# Patient Record
Sex: MALE | Race: WHITE | NOT HISPANIC OR LATINO | Employment: OTHER | URBAN - METROPOLITAN AREA
[De-identification: names, ages, dates, MRNs, and addresses within clinical notes are randomized per-mention and may not be internally consistent; named-entity substitution may affect disease eponyms.]

---

## 2023-08-21 ENCOUNTER — HOSPITAL ENCOUNTER (OUTPATIENT)
Dept: DATA CONVERSION | Facility: HOSPITAL | Age: 73
Discharge: HOME | End: 2023-08-21
Payer: MEDICARE

## 2023-08-21 DIAGNOSIS — N18.2 CHRONIC KIDNEY DISEASE, STAGE 2 (MILD): ICD-10-CM

## 2023-08-21 LAB
ANION GAP SERPL CALCULATED.3IONS-SCNC: 11 MMOL/L (ref 0–19)
BUN SERPL-MCNC: 36 MG/DL (ref 8–25)
BUN/CREAT SERPL: 36 RATIO (ref 8–21)
CALCIUM SERPL-MCNC: 8.3 MG/DL (ref 8.5–10.4)
CHLORIDE SERPL-SCNC: 107 MMOL/L (ref 97–107)
CO2 SERPL-SCNC: 23 MMOL/L (ref 24–31)
CREAT SERPL-MCNC: 1 MG/DL (ref 0.4–1.6)
GFR SERPL CREATININE-BSD FRML MDRD: 81 ML/MIN/1.73 M2
GLUCOSE SERPL-MCNC: 102 MG/DL (ref 65–99)
POTASSIUM SERPL-SCNC: 4.8 MMOL/L (ref 3.4–5.1)
SODIUM SERPL-SCNC: 141 MMOL/L (ref 133–145)

## 2023-08-22 ENCOUNTER — PATIENT OUTREACH (OUTPATIENT)
Dept: CARE COORDINATION | Facility: CLINIC | Age: 73
End: 2023-08-22
Payer: MEDICARE

## 2023-08-22 NOTE — PROGRESS NOTES
Outreach call to patient to support a smooth transition of care from recent admission.  Left voicemail message for patient with my contact information.    CHANO WardN, RN

## 2023-09-06 ENCOUNTER — HOSPITAL ENCOUNTER (OUTPATIENT)
Dept: DATA CONVERSION | Facility: HOSPITAL | Age: 73
Discharge: HOME | End: 2023-09-06
Payer: MEDICARE

## 2023-09-06 DIAGNOSIS — R06.02 SHORTNESS OF BREATH: ICD-10-CM

## 2023-09-06 DIAGNOSIS — I48.0 PAROXYSMAL ATRIAL FIBRILLATION (MULTI): ICD-10-CM

## 2023-09-06 DIAGNOSIS — R07.89 OTHER CHEST PAIN: ICD-10-CM

## 2023-09-06 DIAGNOSIS — Z79.01 LONG TERM (CURRENT) USE OF ANTICOAGULANTS: ICD-10-CM

## 2023-09-06 LAB
ALBUMIN SERPL-MCNC: 3.9 GM/DL (ref 3.5–5)
ALBUMIN/GLOB SERPL: 1.9 RATIO (ref 1.5–3)
ALP BLD-CCNC: 88 U/L (ref 35–125)
ALT SERPL-CCNC: 27 U/L (ref 5–40)
ANION GAP SERPL CALCULATED.3IONS-SCNC: 11 MMOL/L (ref 0–19)
ANTICOAGULANT: ABNORMAL
AST SERPL-CCNC: 20 U/L (ref 5–40)
BASOPHILS # BLD AUTO: 0.03 K/UL (ref 0–0.22)
BASOPHILS NFR BLD AUTO: 0.4 % (ref 0–1)
BILIRUB SERPL-MCNC: 0.7 MG/DL (ref 0.1–1.2)
BUN SERPL-MCNC: 20 MG/DL (ref 8–25)
BUN/CREAT SERPL: 22.2 RATIO (ref 8–21)
CALCIUM SERPL-MCNC: 9.6 MG/DL (ref 8.5–10.4)
CHLORIDE SERPL-SCNC: 100 MMOL/L (ref 97–107)
CO2 SERPL-SCNC: 28 MMOL/L (ref 24–31)
CREAT SERPL-MCNC: 0.9 MG/DL (ref 0.4–1.6)
DEPRECATED RDW RBC AUTO: 45.7 FL (ref 37–54)
DIFFERENTIAL METHOD BLD: ABNORMAL
EOSINOPHIL # BLD AUTO: 0.51 K/UL (ref 0–0.45)
EOSINOPHIL NFR BLD: 6.6 % (ref 0–3)
ERYTHROCYTE [DISTWIDTH] IN BLOOD BY AUTOMATED COUNT: 13.9 % (ref 11.7–15)
GFR SERPL CREATININE-BSD FRML MDRD: 92 ML/MIN/1.73 M2
GLOBULIN SER-MCNC: 2.1 G/DL (ref 1.9–3.7)
GLUCOSE SERPL-MCNC: 90 MG/DL (ref 65–99)
HCT VFR BLD AUTO: 45 % (ref 41–50)
HGB BLD-MCNC: 14.9 GM/DL (ref 13.5–16.5)
HS TROPONIN T DELTA: 1 (ref 0–4)
HS TROPONIN T DELTA: ABNORMAL (ref 0–4)
IMM GRANULOCYTES # BLD AUTO: 0.03 K/UL (ref 0–0.1)
INR IN PPP BY COAGULATION ASSAY EXTERNAL: 2.9
INR PPP: 2.5 (ref 0.86–1.16)
LYMPHOCYTES # BLD AUTO: 1.09 K/UL (ref 1.2–3.2)
LYMPHOCYTES NFR BLD MANUAL: 14.1 % (ref 20–40)
MCH RBC QN AUTO: 29.7 PG (ref 26–34)
MCHC RBC AUTO-ENTMCNC: 33.1 % (ref 31–37)
MCV RBC AUTO: 89.8 FL (ref 80–100)
MONOCYTES # BLD AUTO: 0.65 K/UL (ref 0–0.8)
MONOCYTES NFR BLD MANUAL: 8.4 % (ref 0–8)
NEUTROPHILS # BLD AUTO: 5.41 K/UL
NEUTROPHILS # BLD AUTO: 5.41 K/UL (ref 1.8–7.7)
NEUTROPHILS.IMMATURE NFR BLD: 0.4 % (ref 0–1)
NEUTS SEG NFR BLD: 70.1 % (ref 50–70)
NRBC BLD-RTO: 0 /100 WBC
NT-PROBNP SERPL-MCNC: 96 PG/ML (ref 0–229)
PLATELET # BLD AUTO: 171 K/UL (ref 150–450)
PMV BLD AUTO: 10.6 CU (ref 7–12.6)
POTASSIUM SERPL-SCNC: 4 MMOL/L (ref 3.4–5.1)
PROT SERPL-MCNC: 6 G/DL (ref 5.9–7.9)
PROTHROMBIN TIME (PT) IN PPP BY COAGULATION ASSAY EXTERNAL: NORMAL SECONDS
PROTHROMBIN TIME: 25.6 SEC (ref 9.3–12.7)
RBC # BLD AUTO: 5.01 M/UL (ref 4.5–5.5)
SODIUM SERPL-SCNC: 139 MMOL/L (ref 133–145)
TROPONIN T SERPL-MCNC: 36 NG/L
TROPONIN T SERPL-MCNC: 37 NG/L
WBC # BLD AUTO: 7.7 K/UL (ref 4.5–11)

## 2023-09-07 LAB
DATE AND TIME OF SYMPTOM ONSET: ABNORMAL
EUA DISCLAIMER: ABNORMAL
FLUAV RNA NPH QL NAA+PROBE: ABNORMAL
FLUBV RNA NPH QL NAA+PROBE: ABNORMAL
Lab: ABNORMAL
PREGNANCY STATUS: ABNORMAL
SARS-COV-2 RNA SPEC QL NAA+PROBE: ABNORMAL
WHETHER PATIENT IS EMPLOYED IN A HEALTHCARE SETTING: ABNORMAL
WHETHER PATIENT RESIDES IN A CONGREGATE CARE SETTING: ABNORMAL
WHETHER THE PATIENT HAS SYMPTOMS RELATED TO CONDITION OF INTEREST: ABNORMAL
WHETHER THE PATIENT WAS ADMITTED TO INTENSIVE CARE UNIT (ICU) FOR CONDITION OF INTEREST: ABNORMAL
WHETHER THE PATIENT WAS HOSPITALIZED FOR CONDITION OF INTEREST: ABNORMAL
WHETHER THIS IS THE PATIENT'S FIRST TEST FOR CONDITION OF INTEREST: ABNORMAL

## 2023-09-09 PROBLEM — H26.493 BILATERAL POSTERIOR CAPSULAR OPACIFICATION: Status: ACTIVE | Noted: 2023-09-09

## 2023-09-09 PROBLEM — M54.9 BACKACHE: Status: ACTIVE | Noted: 2023-09-09

## 2023-09-09 PROBLEM — R09.02 HYPOXIA: Status: ACTIVE | Noted: 2023-09-09

## 2023-09-09 PROBLEM — I47.10 NONSUSTAINED PAROXYSMAL SUPRAVENTRICULAR TACHYCARDIA (CMS-HCC): Status: ACTIVE | Noted: 2023-09-09

## 2023-09-09 PROBLEM — S32.050A COMPRESSION FRACTURE OF FIFTH LUMBAR VERTEBRA (MULTI): Status: ACTIVE | Noted: 2023-09-09

## 2023-09-09 PROBLEM — I10 BENIGN HYPERTENSION: Status: ACTIVE | Noted: 2023-09-09

## 2023-09-09 PROBLEM — M25.519 SHOULDER PAIN: Status: ACTIVE | Noted: 2023-09-09

## 2023-09-09 PROBLEM — J33.9 NASAL POLYPS: Status: ACTIVE | Noted: 2023-09-09

## 2023-09-09 PROBLEM — M81.0 AGE-RELATED OSTEOPOROSIS WITHOUT CURRENT PATHOLOGICAL FRACTURE: Status: ACTIVE | Noted: 2023-09-09
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
## 2023-09-09 PROBLEM — S22.39XA CLOSED FRACTURE OF ONE RIB: Status: ACTIVE | Noted: 2023-09-09

## 2023-09-09 PROBLEM — E78.5 HYPERLIPIDEMIA: Status: ACTIVE | Noted: 2023-09-09

## 2023-09-09 PROBLEM — R52 GENERALIZED PAIN: Status: ACTIVE | Noted: 2023-09-09

## 2023-09-09 PROBLEM — R07.9 CHEST PAIN: Status: ACTIVE | Noted: 2023-09-09

## 2023-09-09 PROBLEM — R06.2 WHEEZING: Status: ACTIVE | Noted: 2023-09-09

## 2023-09-09 PROBLEM — E86.0 DEHYDRATION: Status: ACTIVE | Noted: 2023-09-09

## 2023-09-09 PROBLEM — R06.00 DYSPNEA: Status: ACTIVE | Noted: 2023-09-09

## 2023-09-09 PROBLEM — Z86.0100 HX OF COLONIC POLYP: Status: ACTIVE | Noted: 2023-09-09

## 2023-09-09 PROBLEM — I48.20 CHRONIC ATRIAL FIBRILLATION (MULTI): Status: ACTIVE | Noted: 2023-09-09

## 2023-09-09 PROBLEM — M85.852 OSTEOPENIA OF LEFT HIP: Status: ACTIVE | Noted: 2023-09-09

## 2023-09-09 PROBLEM — J32.9 CHRONIC SINUSITIS: Status: ACTIVE | Noted: 2023-09-09

## 2023-09-09 PROBLEM — R06.03 RESPIRATORY DISTRESS: Status: ACTIVE | Noted: 2023-09-09

## 2023-09-09 PROBLEM — G47.33 OBSTRUCTIVE SLEEP APNEA SYNDROME: Status: ACTIVE | Noted: 2023-09-09

## 2023-09-09 PROBLEM — R42 DIZZINESS: Status: ACTIVE | Noted: 2023-09-09

## 2023-09-09 PROBLEM — R09.81 CONGESTION OF NASAL SINUS: Status: ACTIVE | Noted: 2023-09-09

## 2023-09-09 PROBLEM — E78.5 DYSLIPIDEMIA: Status: ACTIVE | Noted: 2023-09-09

## 2023-09-09 PROBLEM — R00.2 PALPITATIONS: Status: ACTIVE | Noted: 2023-09-09

## 2023-09-09 PROBLEM — I50.9 CONGESTIVE HEART FAILURE (MULTI): Status: ACTIVE | Noted: 2023-09-09

## 2023-09-09 PROBLEM — J18.9 ATYPICAL PNEUMONIA: Status: ACTIVE | Noted: 2023-09-09

## 2023-09-09 PROBLEM — J44.9 CHRONIC OBSTRUCTIVE PULMONARY DISEASE (MULTI): Status: ACTIVE | Noted: 2023-09-09

## 2023-09-09 PROBLEM — I47.29 NONSUSTAINED VENTRICULAR TACHYCARDIA (MULTI): Status: ACTIVE | Noted: 2023-09-09

## 2023-09-09 PROBLEM — C61 PROSTATE CANCER (MULTI): Status: ACTIVE | Noted: 2023-09-09

## 2023-09-09 PROBLEM — H35.3190 NONEXUDATIVE AGE-RELATED MACULAR DEGENERATION: Status: ACTIVE | Noted: 2023-09-09

## 2023-09-09 PROBLEM — R09.02 HYPOXEMIA: Status: ACTIVE | Noted: 2023-09-09

## 2023-09-09 PROBLEM — H35.3290 EXUDATIVE AGE-RELATED MACULAR DEGENERATION (MULTI): Status: ACTIVE | Noted: 2023-09-09

## 2023-09-09 PROBLEM — R68.83 CHILL: Status: ACTIVE | Noted: 2023-09-09

## 2023-09-09 PROBLEM — Z96.1 ARTIFICIAL LENS PRESENT: Status: ACTIVE | Noted: 2023-09-09

## 2023-09-09 PROBLEM — R53.81 MALAISE: Status: ACTIVE | Noted: 2023-09-09

## 2023-09-09 PROBLEM — Z86.010 HX OF COLONIC POLYP: Status: ACTIVE | Noted: 2023-09-09

## 2023-09-09 PROBLEM — I48.0 PAROXYSMAL ATRIAL FIBRILLATION (MULTI): Status: ACTIVE | Noted: 2023-09-09

## 2023-09-09 RX ORDER — IPRATROPIUM BROMIDE AND ALBUTEROL SULFATE 2.5; .5 MG/3ML; MG/3ML
SOLUTION RESPIRATORY (INHALATION)
COMMUNITY
Start: 2020-08-04 | End: 2023-11-03 | Stop reason: HOSPADM

## 2023-09-09 RX ORDER — MONTELUKAST SODIUM 10 MG/1
TABLET ORAL
COMMUNITY
End: 2023-11-03 | Stop reason: HOSPADM

## 2023-09-09 RX ORDER — DOXYCYCLINE HYCLATE 100 MG
TABLET ORAL
COMMUNITY
Start: 2023-06-11 | End: 2023-10-24 | Stop reason: ALTCHOICE

## 2023-09-09 RX ORDER — PREDNISONE 10 MG/1
10 TABLET ORAL DAILY
COMMUNITY
Start: 2021-08-20 | End: 2023-10-30 | Stop reason: ALTCHOICE

## 2023-09-09 RX ORDER — LIDOCAINE AND MENTHOL 240; 60 MG/1; MG/1
PATCH TOPICAL
COMMUNITY
Start: 2022-11-06 | End: 2023-11-03 | Stop reason: HOSPADM

## 2023-09-09 RX ORDER — TIOTROPIUM BROMIDE 18 UG/1
CAPSULE ORAL; RESPIRATORY (INHALATION)
COMMUNITY
End: 2023-11-03 | Stop reason: HOSPADM

## 2023-09-09 RX ORDER — PREDNISONE 20 MG/1
TABLET ORAL
COMMUNITY
Start: 2023-06-11 | End: 2023-10-30 | Stop reason: ALTCHOICE

## 2023-09-09 RX ORDER — KETOROLAC TROMETHAMINE 5 MG/ML
SOLUTION OPHTHALMIC
COMMUNITY
Start: 2021-05-06 | End: 2023-11-03 | Stop reason: HOSPADM

## 2023-09-09 RX ORDER — ALENDRONATE SODIUM 70 MG/1
TABLET ORAL
COMMUNITY
Start: 2020-12-14 | End: 2023-11-03 | Stop reason: HOSPADM

## 2023-09-09 RX ORDER — LOSARTAN POTASSIUM 50 MG/1
TABLET ORAL
COMMUNITY
Start: 2023-08-19 | End: 2023-10-30 | Stop reason: SDUPTHER

## 2023-09-09 RX ORDER — CEPHALEXIN 500 MG/1
1 CAPSULE ORAL 3 TIMES DAILY
COMMUNITY
Start: 2021-08-20 | End: 2023-10-24 | Stop reason: ALTCHOICE

## 2023-09-09 RX ORDER — WARFARIN 2 MG/1
2 TABLET ORAL DAILY
COMMUNITY
End: 2023-11-03 | Stop reason: HOSPADM

## 2023-09-09 RX ORDER — DILTIAZEM HYDROCHLORIDE 180 MG/1
CAPSULE, COATED, EXTENDED RELEASE ORAL
COMMUNITY
Start: 2023-08-19 | End: 2023-10-30 | Stop reason: SDUPTHER

## 2023-09-09 RX ORDER — DIGOXIN 125 MCG
TABLET ORAL
COMMUNITY
Start: 2023-08-19 | End: 2023-10-30 | Stop reason: SDUPTHER

## 2023-09-09 RX ORDER — FLUTICASONE PROPIONATE 50 MCG
SPRAY, SUSPENSION (ML) NASAL
COMMUNITY
End: 2023-11-03 | Stop reason: HOSPADM

## 2023-09-09 RX ORDER — LOSARTAN POTASSIUM AND HYDROCHLOROTHIAZIDE 12.5; 5 MG/1; MG/1
TABLET ORAL
COMMUNITY
End: 2023-10-02 | Stop reason: SDUPTHER

## 2023-09-09 RX ORDER — ATORVASTATIN CALCIUM 10 MG/1
TABLET, FILM COATED ORAL
COMMUNITY
End: 2023-11-03 | Stop reason: HOSPADM

## 2023-09-09 RX ORDER — TRAMADOL HYDROCHLORIDE 50 MG/1
TABLET ORAL
COMMUNITY
Start: 2022-12-01 | End: 2023-11-03 | Stop reason: HOSPADM

## 2023-09-09 RX ORDER — BUDESONIDE 0.5 MG/2ML
INHALANT ORAL
COMMUNITY
Start: 2020-11-10 | End: 2023-11-03 | Stop reason: HOSPADM

## 2023-09-09 RX ORDER — DILTIAZEM HYDROCHLORIDE 120 MG/1
CAPSULE, EXTENDED RELEASE ORAL
COMMUNITY
End: 2023-10-30 | Stop reason: SDUPTHER

## 2023-09-21 ENCOUNTER — HOSPITAL ENCOUNTER (OUTPATIENT)
Dept: DATA CONVERSION | Facility: HOSPITAL | Age: 73
Discharge: HOME | End: 2023-09-21
Payer: MEDICARE

## 2023-09-21 DIAGNOSIS — J44.1 CHRONIC OBSTRUCTIVE PULMONARY DISEASE WITH (ACUTE) EXACERBATION (MULTI): ICD-10-CM

## 2023-09-21 DIAGNOSIS — R07.9 CHEST PAIN, UNSPECIFIED: ICD-10-CM

## 2023-09-21 DIAGNOSIS — Z79.01 LONG TERM (CURRENT) USE OF ANTICOAGULANTS: ICD-10-CM

## 2023-09-21 DIAGNOSIS — R06.02 SHORTNESS OF BREATH: ICD-10-CM

## 2023-09-21 DIAGNOSIS — U07.1 COVID-19: ICD-10-CM

## 2023-09-21 DIAGNOSIS — R09.02 HYPOXEMIA: ICD-10-CM

## 2023-09-21 DIAGNOSIS — R05.9 COUGH, UNSPECIFIED: ICD-10-CM

## 2023-09-21 LAB
ANION GAP SERPL CALCULATED.3IONS-SCNC: 10 MMOL/L (ref 0–19)
ANTICOAGULANT: ABNORMAL
BASOPHILS # BLD AUTO: 0.08 K/UL (ref 0–0.22)
BASOPHILS NFR BLD AUTO: 0.8 % (ref 0–1)
BUN SERPL-MCNC: 21 MG/DL (ref 8–25)
BUN/CREAT SERPL: 21 RATIO (ref 8–21)
CALCIUM SERPL-MCNC: 9.5 MG/DL (ref 8.5–10.4)
CHLORIDE SERPL-SCNC: 105 MMOL/L (ref 97–107)
CO2 SERPL-SCNC: 27 MMOL/L (ref 24–31)
CREAT SERPL-MCNC: 1 MG/DL (ref 0.4–1.6)
D DIMER PPP FEU-MCNC: 0.89 MG/L FEU (ref 0.19–0.5)
DEPRECATED RDW RBC AUTO: 46.4 FL (ref 37–54)
DIFFERENTIAL METHOD BLD: ABNORMAL
EOSINOPHIL # BLD AUTO: 0.36 K/UL (ref 0–0.45)
EOSINOPHIL NFR BLD: 3.4 % (ref 0–3)
ERYTHROCYTE [DISTWIDTH] IN BLOOD BY AUTOMATED COUNT: 14.2 % (ref 11.7–15)
GFR SERPL CREATININE-BSD FRML MDRD: 81 ML/MIN/1.73 M2
GLUCOSE SERPL-MCNC: 89 MG/DL (ref 65–99)
HCT VFR BLD AUTO: 44.7 % (ref 41–50)
HGB BLD-MCNC: 14.8 GM/DL (ref 13.5–16.5)
HS TROPONIN T DELTA: 1 (ref 0–4)
HS TROPONIN T DELTA: ABNORMAL (ref 0–4)
IMM GRANULOCYTES # BLD AUTO: 0.13 K/UL (ref 0–0.1)
INR PPP: 2.4 (ref 0.86–1.16)
LYMPHOCYTES # BLD AUTO: 0.85 K/UL (ref 1.2–3.2)
LYMPHOCYTES NFR BLD MANUAL: 8.1 % (ref 20–40)
MCH RBC QN AUTO: 29.7 PG (ref 26–34)
MCHC RBC AUTO-ENTMCNC: 33.1 % (ref 31–37)
MCV RBC AUTO: 89.8 FL (ref 80–100)
MONOCYTES # BLD AUTO: 0.75 K/UL (ref 0–0.8)
MONOCYTES NFR BLD MANUAL: 7.1 % (ref 0–8)
NEUTROPHILS # BLD AUTO: 8.37 K/UL
NEUTROPHILS # BLD AUTO: 8.37 K/UL (ref 1.8–7.7)
NEUTROPHILS.IMMATURE NFR BLD: 1.2 % (ref 0–1)
NEUTS SEG NFR BLD: 79.4 % (ref 50–70)
NOTE (COV): ABNORMAL
NRBC BLD-RTO: 0 /100 WBC
NT-PROBNP SERPL-MCNC: 93 PG/ML (ref 0–229)
PLATELET # BLD AUTO: 222 K/UL (ref 150–450)
PMV BLD AUTO: 10.6 CU (ref 7–12.6)
POTASSIUM SERPL-SCNC: 3.9 MMOL/L (ref 3.4–5.1)
PROTHROMBIN TIME: 24.8 SEC (ref 9.3–12.7)
RBC # BLD AUTO: 4.98 M/UL (ref 4.5–5.5)
SARS-COV-2 RNA RESP QL NAA+PROBE: ABNORMAL
SODIUM SERPL-SCNC: 142 MMOL/L (ref 133–145)
SPECIMEN SOURCE (COV): ABNORMAL
TROPONIN T SERPL-MCNC: 34 NG/L
TROPONIN T SERPL-MCNC: 35 NG/L
WBC # BLD AUTO: 10.5 K/UL (ref 4.5–11)

## 2023-09-25 DIAGNOSIS — I48.0 PAROXYSMAL ATRIAL FIBRILLATION (MULTI): Primary | ICD-10-CM

## 2023-10-02 ENCOUNTER — OFFICE VISIT (OUTPATIENT)
Dept: PRIMARY CARE | Facility: CLINIC | Age: 73
End: 2023-10-02
Payer: MEDICARE

## 2023-10-02 VITALS
BODY MASS INDEX: 26.11 KG/M2 | SYSTOLIC BLOOD PRESSURE: 140 MMHG | WEIGHT: 182 LBS | HEART RATE: 92 BPM | OXYGEN SATURATION: 97 % | RESPIRATION RATE: 16 BRPM | DIASTOLIC BLOOD PRESSURE: 80 MMHG

## 2023-10-02 DIAGNOSIS — I50.42 CHRONIC COMBINED SYSTOLIC AND DIASTOLIC CONGESTIVE HEART FAILURE (MULTI): ICD-10-CM

## 2023-10-02 DIAGNOSIS — I10 PRIMARY HYPERTENSION: ICD-10-CM

## 2023-10-02 DIAGNOSIS — I10 BENIGN HYPERTENSION: ICD-10-CM

## 2023-10-02 DIAGNOSIS — Z12.5 ENCOUNTER FOR PROSTATE CANCER SCREENING: ICD-10-CM

## 2023-10-02 DIAGNOSIS — I48.20 CHRONIC ATRIAL FIBRILLATION (MULTI): ICD-10-CM

## 2023-10-02 PROBLEM — J44.9 COPD (CHRONIC OBSTRUCTIVE PULMONARY DISEASE) (MULTI): Status: ACTIVE | Noted: 2023-10-02

## 2023-10-02 PROBLEM — M85.80 OSTEOPENIA: Status: ACTIVE | Noted: 2023-10-02

## 2023-10-02 PROBLEM — I48.91 A-FIB (MULTI): Status: ACTIVE | Noted: 2023-10-02

## 2023-10-02 PROCEDURE — 99214 OFFICE O/P EST MOD 30 MIN: CPT | Performed by: INTERNAL MEDICINE

## 2023-10-02 PROCEDURE — 1159F MED LIST DOCD IN RCRD: CPT | Performed by: INTERNAL MEDICINE

## 2023-10-02 PROCEDURE — G0439 PPPS, SUBSEQ VISIT: HCPCS | Performed by: INTERNAL MEDICINE

## 2023-10-02 PROCEDURE — 3077F SYST BP >= 140 MM HG: CPT | Performed by: INTERNAL MEDICINE

## 2023-10-02 PROCEDURE — 1126F AMNT PAIN NOTED NONE PRSNT: CPT | Performed by: INTERNAL MEDICINE

## 2023-10-02 PROCEDURE — 1036F TOBACCO NON-USER: CPT | Performed by: INTERNAL MEDICINE

## 2023-10-02 PROCEDURE — 3079F DIAST BP 80-89 MM HG: CPT | Performed by: INTERNAL MEDICINE

## 2023-10-02 RX ORDER — LOSARTAN POTASSIUM AND HYDROCHLOROTHIAZIDE 12.5; 5 MG/1; MG/1
1 TABLET ORAL DAILY
Qty: 90 TABLET | Refills: 3 | Status: SHIPPED | OUTPATIENT
Start: 2023-10-02 | End: 2023-11-03 | Stop reason: HOSPADM

## 2023-10-02 ASSESSMENT — ENCOUNTER SYMPTOMS
ABDOMINAL PAIN: 0
DIARRHEA: 1
DEPRESSION: 0
LOSS OF SENSATION IN FEET: 0
SEIZURES: 0
HEMATURIA: 0
COUGH: 1
SHORTNESS OF BREATH: 0
OCCASIONAL FEELINGS OF UNSTEADINESS: 0
CHILLS: 0
FEVER: 0
BACK PAIN: 0

## 2023-10-02 ASSESSMENT — PATIENT HEALTH QUESTIONNAIRE - PHQ9
2. FEELING DOWN, DEPRESSED OR HOPELESS: NOT AT ALL
1. LITTLE INTEREST OR PLEASURE IN DOING THINGS: NOT AT ALL
SUM OF ALL RESPONSES TO PHQ9 QUESTIONS 1 AND 2: 0

## 2023-10-02 ASSESSMENT — PAIN SCALES - GENERAL: PAINLEVEL: 0-NO PAIN

## 2023-10-02 NOTE — PROGRESS NOTES
Subjective   Patient ID: Reggie Munoz is a 72 y.o. male who presents for Annual Exam. Overall he is feeling well. He reports his breathing is not bad. He does not monitor his BP at home. He does not use pill box and no one assists him with his medications. He lives by himself and follows with Dr. Abad. He reports hearing loss, occ cough, and one episode of diarrhea. He is not interested in the flu shot.    Medication Documentation Review Audit       Reviewed by Evelio Edmonds MA (Medical Assistant) on 10/02/23 at 1454      Medication Order Taking? Sig Documenting Provider Last Dose Status   alendronate (Fosamax) 70 mg tablet [id number] No Alendronate Sodium 70 MG Oral Tablet   Quantity: 4  Refills: 0        Start : 14-Dec-2020   Active Historical Provider, MD Not Taking Active   atorvastatin (Lipitor) 10 mg tablet [id number] Yes  Historical Provider, MD Taking Active   azithromycin (Zithromax) 250 mg tablet [id number] No TAKE 1 TABLET BY MOUTH DAILY   Patient not taking: Reported on 10/2/2023    Keeley Painter APRN-CNP Not Taking Active   budesonide (Pulmicort) 0.5 mg/2 mL nebulizer solution [id number] Yes Budesonide 0.5 MG/2ML Inhalation Suspension   Quantity: 120  Refills: 0        Start : 10-Nov-2020   Active Historical Provider, MD Taking Active   cephalexin (Keflex) 500 mg capsule [id number] No Take 1 capsule (500 mg) by mouth 3 times a day. Historical Provider, MD Not Taking Active   digoxin (Lanoxin) 125 MCG tablet [id number] No  Historical Provider, MD Not Taking Active   digoxin (Lanoxin) 125 MCG tablet [id number] No TAKE 1 TABLET BY MOUTH DAILY   Patient not taking: Reported on 10/2/2023    Keeley Painter APRN-CNP Not Taking Active   dilTIAZem CD (Cardizem CD) 180 mg 24 hr capsule [id number] No  Historical Provider, MD Not Taking Active   dilTIAZem CD (Cardizem CD) 180 mg 24 hr capsule [id number] No TAKE 1 CAPSULE BY MOUTH DAILY   Patient not taking: Reported on 10/2/2023    Keeley Painter  APRN-CNP Not Taking Active   dilTIAZem ER (Tiazac) 120 mg 24 hr capsule [id number] No DilTIAZem  MG CP24   Refills: 0       Active Historical Provider, MD Not Taking Active   doxycycline (Vibra-Tabs) 100 mg tablet [id number] No  Historical Provider, MD Not Taking Active   fluticasone (Flonase) 50 mcg/actuation nasal spray [id number] Yes  Historical Provider, MD Taking Active   Icy Hot Patch, lido-menthol, 4-1 % adhesive patch,medicated [id number] No  Historical Provider, MD Not Taking Active   ipratropium-albuteroL (Duo-Neb) 0.5-2.5 mg/3 mL nebulizer solution [id number] Yes Inhale. Historical Provider, MD Taking Active   ketorolac (Acular) 0.5 % ophthalmic solution [id number] No Ketorolac Tromethamine 0.5 % Ophthalmic Solution   Quantity: 5  Refills: 0        Start : 6-May-2021   Active Historical Provider, MD Not Taking Active   losartan (Cozaar) 50 mg tablet [id number] No  Historical Provider, MD Not Taking Active   losartan (Cozaar) 50 mg tablet [id number] No TAKE 1 TABLET BY MOUTH DAILY   Patient not taking: Reported on 10/2/2023    RYLAND Mora Not Taking Active   losartan-hydrochlorothiazide (Hyzaar) 50-12.5 mg tablet [id number] Yes  Historical Provider, MD Taking Active   montelukast (Singulair) 10 mg tablet [id number] Yes  Historical Provider, MD Taking Active   predniSONE (Deltasone) 10 mg tablet [id number] Yes Take 1 tablet (10 mg) by mouth once daily. Historical Provider, MD Taking Active   predniSONE (Deltasone) 10 mg tablet [id number] No TAKE 6 PILLS 8/20 & 8/21, 5 PILLS 8/22 AND 8/23, 4 PILLS 8/24 AND 8/25, 3 PILLS 8/26 AND 8/27, 2 PILLS 8/28 & 8/29 AND 1 PILL 8/30 & 8/31   Patient not taking: Reported on 10/2/2023    RYLAND Mora Not Taking Active   predniSONE (Deltasone) 20 mg tablet [id number] No  Historical Provider, MD Not Taking Active   tiotropium (Spiriva with HandiHaler) 18 mcg inhalation capsule [id number] Yes  Historical Provider, MD Taking Active   traMADol (Ultram)  50 mg tablet [id number] No  Historical Provider, MD Not Taking Active   warfarin (Coumadin) 2 mg tablet [id number] Yes Take 2 mg/kg by mouth once daily. Historical Provider, MD Taking Active                  Past Medical History:   Diagnosis Date    A-fib (CMS/[facility])     COPD (chronic obstructive pulmonary disease) (CMS/[facility])     Hyperlipidemia     Hypertension     Osteopenia     Personal history of other diseases of the circulatory system     History of angina    Personal history of other diseases of the circulatory system     History of hypertension    Personal history of other diseases of the respiratory system     History of chronic obstructive lung disease    Unspecified asthma with (acute) exacerbation     Asthma with acute exacerbation, unspecified asthma severity, unspecified whether persistent     Past Surgical History:   Procedure Laterality Date    OTHER SURGICAL HISTORY  07/23/2021    No history of surgery       Review of Systems   Constitutional:  Negative for chills and fever.   HENT:  Positive for hearing loss. Negative for dental problem and ear pain.    Respiratory:  Positive for cough. Negative for shortness of breath.    Cardiovascular:  Negative for chest pain.   Gastrointestinal:  Positive for diarrhea. Negative for abdominal pain.   Endocrine: Negative for cold intolerance.   Genitourinary:  Negative for hematuria.   Musculoskeletal:  Negative for back pain.   Skin:  Negative for rash.   Neurological:  Negative for seizures.       Objective   /80   Pulse 92   Resp 16   Wt 82.6 kg (182 lb)   SpO2 97%   BMI 26.11 kg/m²     Physical Exam  HENT:      Right Ear: Tympanic membrane normal.      Left Ear: Tympanic membrane normal.      Mouth/Throat:      Pharynx: Oropharynx is clear. No oropharyngeal exudate.   Eyes:      Conjunctiva/sclera: Conjunctivae normal.      Pupils: Pupils are equal, round, and reactive to light.   Neck:      Thyroid: No thyromegaly.      Vascular: No carotid bruit.    Cardiovascular:      Rate and Rhythm: Regular rhythm.      Heart sounds: Normal heart sounds.   Pulmonary:      Breath sounds: Normal breath sounds.   Abdominal:      General: Bowel sounds are normal.      Palpations: Abdomen is soft. There is no hepatomegaly.      Tenderness: There is no abdominal tenderness.   Musculoskeletal:      Right lower le+ Pitting Edema present.      Left lower le+ Pitting Edema present.   Lymphadenopathy:      Cervical: No cervical adenopathy.   Skin:     General: Skin is warm.   Neurological:      Mental Status: He is alert and oriented to person, place, and time.      Sensory: No sensory deficit.   Psychiatric:         Mood and Affect: Mood and affect normal.         Behavior: Behavior normal. Behavior is cooperative.         Cognition and Memory: Cognition normal.     Diagnostics Reviewed: 2023 Echo normal, 2023 CT angio normal  Labs Reviewed: 2023 BMP nml, CBC nml,     Assessment/Plan   There are no diagnoses linked to this encounter.      By signing my name below, Amarilis COLLINS Scribe   attest that this documentation has been prepared under the direction and in the presence of Rajwinder Dawson MD.

## 2023-10-04 ENCOUNTER — INFUSION (OUTPATIENT)
Dept: PRIMARY CARE | Facility: HOSPITAL | Age: 73
End: 2023-10-04
Payer: MEDICARE

## 2023-10-04 DIAGNOSIS — I48.0 PAROXYSMAL ATRIAL FIBRILLATION (MULTI): Primary | ICD-10-CM

## 2023-10-04 LAB
POC INR: 3.6
POC PROTHROMBIN TIME: NORMAL

## 2023-10-04 PROCEDURE — 85610 PROTHROMBIN TIME: CPT | Performed by: NURSE PRACTITIONER

## 2023-10-11 ENCOUNTER — ANTICOAGULATION - WARFARIN VISIT (OUTPATIENT)
Dept: CARDIOLOGY | Facility: HOSPITAL | Age: 73
End: 2023-10-11
Payer: MEDICARE

## 2023-10-11 DIAGNOSIS — I48.0 PAROXYSMAL ATRIAL FIBRILLATION (MULTI): Primary | ICD-10-CM

## 2023-10-11 LAB
POC INR: 3
POC PROTHROMBIN TIME: NORMAL

## 2023-10-11 PROCEDURE — 99211 OFF/OP EST MAY X REQ PHY/QHP: CPT | Performed by: INTERNAL MEDICINE

## 2023-10-11 PROCEDURE — 85610 PROTHROMBIN TIME: CPT

## 2023-10-11 NOTE — PROGRESS NOTES
Patient identification verified with 2 identifiers.    Location: [facility] - 1st Floor Anticoagulation Clinic [address] [id number]    Referring Physician: Dr. Abad  Enrollment/ Re-enrollment date: 10/24/24   INR Goal: 2.0-3.0  INR monitoring is per [facility] protocol.  Anticoagulation Medication: warfarin  Indication: atrial fibrillation    Subjective   Bleeding signs/symptoms:      Bruising:     Major bleeding event:    Thrombosis signs/symptoms:    Thromboembolic event:    Missed doses:    Extra doses:    Medication changes:    Dietary changes:    Change in health:    Change in activity:    Alcohol:    Other concerns:      Upcoming Surgeries:  Does the Patient Have any upcoming surgeries that require interruption in anticoagulation therapy? no  Does the patient require bridging? no      Anticoagulation Summary  As of 10/11/2023      INR goal:  2.0-3.0   TTR:  --   INR used for dosing:                 Assessment/Plan   Therapeutic     1. New dose: no change    2. Next INR: 2 weeks      Education provided to patient during the visit:  Patient instructed to call in interim with questions, concerns and changes.

## 2023-10-19 ENCOUNTER — TELEPHONE (OUTPATIENT)
Dept: PRIMARY CARE | Facility: CLINIC | Age: 73
End: 2023-10-19
Payer: MEDICARE

## 2023-10-19 NOTE — TELEPHONE ENCOUNTER
From ans machine 10/19/2023 @12:06pm:  Have you received their fax from 10/16/23 asking for office notes for an rx?  This request is urgent.  Fax #[phone].

## 2023-10-24 ENCOUNTER — OFFICE VISIT (OUTPATIENT)
Dept: CARDIOLOGY | Facility: CLINIC | Age: 73
End: 2023-10-24
Payer: MEDICARE

## 2023-10-24 VITALS
DIASTOLIC BLOOD PRESSURE: 80 MMHG | HEART RATE: 82 BPM | WEIGHT: 182 LBS | BODY MASS INDEX: 26.11 KG/M2 | SYSTOLIC BLOOD PRESSURE: 145 MMHG | OXYGEN SATURATION: 95 %

## 2023-10-24 DIAGNOSIS — I50.32 CHRONIC DIASTOLIC CONGESTIVE HEART FAILURE (MULTI): ICD-10-CM

## 2023-10-24 DIAGNOSIS — I10 BENIGN HYPERTENSION: Primary | ICD-10-CM

## 2023-10-24 DIAGNOSIS — E78.5 DYSLIPIDEMIA: ICD-10-CM

## 2023-10-24 DIAGNOSIS — I10 PRIMARY HYPERTENSION: ICD-10-CM

## 2023-10-24 DIAGNOSIS — I48.0 PAROXYSMAL ATRIAL FIBRILLATION (MULTI): ICD-10-CM

## 2023-10-24 PROCEDURE — 99214 OFFICE O/P EST MOD 30 MIN: CPT | Performed by: INTERNAL MEDICINE

## 2023-10-24 PROCEDURE — 1036F TOBACCO NON-USER: CPT | Performed by: INTERNAL MEDICINE

## 2023-10-24 PROCEDURE — 1159F MED LIST DOCD IN RCRD: CPT | Performed by: INTERNAL MEDICINE

## 2023-10-24 PROCEDURE — 1126F AMNT PAIN NOTED NONE PRSNT: CPT | Performed by: INTERNAL MEDICINE

## 2023-10-24 PROCEDURE — 3079F DIAST BP 80-89 MM HG: CPT | Performed by: INTERNAL MEDICINE

## 2023-10-24 PROCEDURE — 3077F SYST BP >= 140 MM HG: CPT | Performed by: INTERNAL MEDICINE

## 2023-10-24 RX ORDER — BUTALB/ACETAMINOPHEN/CAFFEINE 50-325-40
1 TABLET ORAL DAILY
COMMUNITY
End: 2023-11-03 | Stop reason: HOSPADM

## 2023-10-24 RX ORDER — ALBUTEROL SULFATE 90 UG/1
2 AEROSOL, METERED RESPIRATORY (INHALATION) EVERY 6 HOURS PRN
COMMUNITY
End: 2024-03-23 | Stop reason: HOSPADM

## 2023-10-24 ASSESSMENT — PATIENT HEALTH QUESTIONNAIRE - PHQ9
SUM OF ALL RESPONSES TO PHQ9 QUESTIONS 1 & 2: 0
1. LITTLE INTEREST OR PLEASURE IN DOING THINGS: NOT AT ALL
2. FEELING DOWN, DEPRESSED OR HOPELESS: NOT AT ALL

## 2023-10-24 ASSESSMENT — ENCOUNTER SYMPTOMS
DYSPNEA ON EXERTION: 1
ORTHOPNEA: 0
SYNCOPE: 0
FEVER: 0
PALPITATIONS: 0
WHEEZING: 0
PND: 0
DIZZINESS: 0
CLAUDICATION: 0
NEAR-SYNCOPE: 0
COUGH: 0
DEPRESSION: 0
OCCASIONAL FEELINGS OF UNSTEADINESS: 0
WEAKNESS: 0
MYALGIAS: 0
WEIGHT LOSS: 0
IRREGULAR HEARTBEAT: 0
SHORTNESS OF BREATH: 0
DIAPHORESIS: 0
LOSS OF SENSATION IN FEET: 0
WEIGHT GAIN: 0

## 2023-10-24 ASSESSMENT — PAIN SCALES - GENERAL: PAINLEVEL: 0-NO PAIN

## 2023-10-24 NOTE — PROGRESS NOTES
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  He also has a history of paroxysmal atrial fibrillation and has been on oral anticoagulation in the form of Coumadin.  He has  severe COPD.  I saw him last in the office in February 2020 this was immediately prior to the COVID pandemic.  He was doing well.  His last hospitalization was in August 2020 for this was for a COPD exacerbation that was rather uncomplicated.       Review of Systems   Constitutional: Negative for diaphoresis, fever, weight gain and weight loss.   Eyes:  Negative for visual disturbance.   Cardiovascular:  Positive for dyspnea on exertion. Negative for chest pain, claudication, irregular heartbeat, leg swelling, near-syncope, orthopnea, palpitations, paroxysmal nocturnal dyspnea and syncope.   Respiratory:  Negative for cough, shortness of breath and wheezing.    Musculoskeletal:  Negative for muscle weakness and myalgias.   Neurological:  Negative for dizziness and weakness.   All other systems reviewed and are negative.       Past Medical History:   Diagnosis Date    A-fib (CMS/[facility])     COPD (chronic obstructive pulmonary disease) (CMS/[facility])     Hyperlipidemia     Hypertension     Osteopenia     Personal history of other diseases of the circulatory system     History of angina    Personal history of other diseases of the circulatory system     History of hypertension    Personal history of other diseases of the respiratory system     History of chronic obstructive lung disease    Unspecified asthma with (acute) exacerbation     Asthma with acute exacerbation, unspecified asthma severity, unspecified whether persistent        Past Surgical History:   Procedure Laterality Date    IR ANGIOGRAM PULMONARY Bilateral 4/10/2012    IR ANGIOGRAM PULMONARY [facility]    OTHER SURGICAL HISTORY  07/23/2021    No history of  surgery        Social History     Socioeconomic History    Marital status: Single     Spouse name: Not on file    Number of children: Not on file    Years of education: Not on file    Highest education level: Not on file   Occupational History    Not on file   Tobacco Use    Smoking status: Never    Smokeless tobacco: Never   Vaping Use    Vaping Use: Never used   Substance and Sexual Activity    Alcohol use: Not Currently    Drug use: Never    Sexual activity: Not Currently   Other Topics Concern    Not on file   Social History Narrative    Not on file     Social Determinants of Health     Financial Resource Strain: Not on file   Food Insecurity: Not on file   Transportation Needs: Not on file   Physical Activity: Not on file   Stress: Not on file   Social Connections: Not on file   Intimate Partner Violence: Not on file   Housing Stability: Not on file        Family History   Problem Relation Name Age of Onset    Stroke Mother      Lung cancer Father      Stroke Sister          OBJECTIVE:    Vitals:    10/24/23 1446   BP: 145/80   Pulse:    SpO2:         Vitals reviewed.   Constitutional:       Appearance: Normal and healthy appearance. Not in distress.   Pulmonary:      Effort: Pulmonary effort is normal.      Breath sounds: Normal breath sounds.   Cardiovascular:      Normal rate. Regular rhythm. Normal S1. Normal S2.       Murmurs: There is no murmur.      No gallop.  No click.   Pulses:     Intact distal pulses.   Edema:     Peripheral edema absent.   Skin:     General: Skin is warm and dry.   Neurological:      General: No focal deficit present.        Lab Review:   Lab Results   Component Value Date     09/21/2023    K 3.9 09/21/2023     09/21/2023    CO2 27 09/21/2023    BUN 21 09/21/2023    CREATININE 1.0 09/21/2023    GLUCOSE 89 09/21/2023    CALCIUM 9.5 09/21/2023     Lab Results   Component Value Date    CHOL 155 02/14/2022    TRIG 155 (H) 02/14/2022    HDL 66 02/14/2022       Lab Results    Component Value Date    LDLCALC 58 (L) 02/14/2022        Benign hypertension  High today. Discussed daily salt restriction. Advised to take BP 2-3x/week at home. No med changes today    Paroxysmal atrial fibrillation (CMS/HCC)  Controlled. Continue current rate control, continue AC    Congestive heart failure (CMS/HCC)  Diastolic. Euvolemic. Continue current medical therapy

## 2023-10-24 NOTE — ASSESSMENT & PLAN NOTE
High today. Discussed daily salt restriction. Advised to take BP 2-3x/week at home. No med changes today

## 2023-10-25 ENCOUNTER — ANTICOAGULATION - WARFARIN VISIT (OUTPATIENT)
Dept: CARDIOLOGY | Facility: HOSPITAL | Age: 73
End: 2023-10-25
Payer: MEDICARE

## 2023-10-25 DIAGNOSIS — I48.0 PAROXYSMAL ATRIAL FIBRILLATION (MULTI): Primary | ICD-10-CM

## 2023-10-25 LAB
POC INR: 3.3
POC PROTHROMBIN TIME: NORMAL

## 2023-10-25 PROCEDURE — 85610 PROTHROMBIN TIME: CPT | Mod: QW

## 2023-10-25 PROCEDURE — 99211 OFF/OP EST MAY X REQ PHY/QHP: CPT | Performed by: INTERNAL MEDICINE

## 2023-10-25 NOTE — PROGRESS NOTES
Patient identification verified with 2 identifiers.    Location: [facility] - 1st Floor Anticoagulation Clinic [address], [place] [address]    Referring Physician: Dr. Abad  Enrollment/ Re-enrollment date: 10/24/24   INR Goal: 2.0-3.0  INR monitoring is per [facility] protocol.  Anticoagulation Medication: warfarin  Indication: Atrial Fibrillation/Atrial Flutter    Subjective   Bleeding signs/symptoms: No    Bruising: No   Major bleeding event: No  Thrombosis signs/symptoms: No  Thromboembolic event: No  Missed doses: No  Extra doses: No  Medication changes: Yes  started in losaratan-HCTZ and a new inhaler. started on 10/2  Dietary changes: No  Change in health: No  Change in activity: No  Alcohol: No  Other concerns: No    Upcoming Surgeries:  Does the Patient Have any upcoming surgeries that require interruption in anticoagulation therapy? no  Does the patient require bridging? no      Anticoagulation Summary  As of 10/25/2023      INR goal:  2.0-3.0   TTR:  0.0 % (2.9 wk)   INR used for dosing:  3.30 (10/25/2023)   Weekly warfarin total:  14 mg               Assessment/Plan   Supratherapeutic     1. New dose:  dose decreased      2. Next INR: 2 weeks      Education provided to patient during the visit:  Patient instructed to call in interim with questions, concerns and changes.

## 2023-10-30 ENCOUNTER — HOSPITAL ENCOUNTER (OUTPATIENT)
Facility: HOSPITAL | Age: 73
Setting detail: OBSERVATION
Discharge: HOME | End: 2023-11-03
Attending: STUDENT IN AN ORGANIZED HEALTH CARE EDUCATION/TRAINING PROGRAM | Admitting: HOSPITALIST
Payer: MEDICARE

## 2023-10-30 ENCOUNTER — APPOINTMENT (OUTPATIENT)
Dept: RADIOLOGY | Facility: HOSPITAL | Age: 73
End: 2023-10-30
Payer: MEDICARE

## 2023-10-30 DIAGNOSIS — I48.91 ATRIAL FIBRILLATION WITH RAPID VENTRICULAR RESPONSE (MULTI): ICD-10-CM

## 2023-10-30 DIAGNOSIS — R00.2 PALPITATIONS: ICD-10-CM

## 2023-10-30 DIAGNOSIS — J06.9 VIRAL URI: ICD-10-CM

## 2023-10-30 DIAGNOSIS — I48.91 ATRIAL FIBRILLATION, UNSPECIFIED TYPE (MULTI): ICD-10-CM

## 2023-10-30 DIAGNOSIS — I50.32 CHRONIC DIASTOLIC CONGESTIVE HEART FAILURE (MULTI): ICD-10-CM

## 2023-10-30 DIAGNOSIS — R06.2 WHEEZING: ICD-10-CM

## 2023-10-30 DIAGNOSIS — E78.5 HYPERLIPIDEMIA, UNSPECIFIED HYPERLIPIDEMIA TYPE: ICD-10-CM

## 2023-10-30 DIAGNOSIS — J44.1 COPD EXACERBATION (MULTI): Primary | ICD-10-CM

## 2023-10-30 LAB
ANION GAP SERPL CALC-SCNC: 9 MMOL/L
APPEARANCE UR: CLEAR
BASOPHILS # BLD AUTO: 0.06 X10*3/UL (ref 0–0.1)
BASOPHILS NFR BLD AUTO: 0.7 %
BILIRUB UR STRIP.AUTO-MCNC: NEGATIVE MG/DL
BUN SERPL-MCNC: 24 MG/DL (ref 8–25)
CALCIUM SERPL-MCNC: 9.4 MG/DL (ref 8.5–10.4)
CHLORIDE SERPL-SCNC: 98 MMOL/L (ref 97–107)
CO2 SERPL-SCNC: 31 MMOL/L (ref 24–31)
COLOR UR: ABNORMAL
CREAT SERPL-MCNC: 0.9 MG/DL (ref 0.4–1.6)
EOSINOPHIL # BLD AUTO: 0.1 X10*3/UL (ref 0–0.7)
EOSINOPHIL NFR BLD AUTO: 1.2 %
ERYTHROCYTE [DISTWIDTH] IN BLOOD BY AUTOMATED COUNT: 14.5 % (ref 11.5–14.5)
FLUAV RNA RESP QL NAA+PROBE: NOT DETECTED
FLUBV RNA RESP QL NAA+PROBE: NOT DETECTED
GFR SERPL CREATININE-BSD FRML MDRD: >90 ML/MIN/1.73M*2
GLUCOSE SERPL-MCNC: 94 MG/DL (ref 65–99)
GLUCOSE UR STRIP.AUTO-MCNC: NORMAL MG/DL
HCT VFR BLD AUTO: 47.8 % (ref 41–52)
HGB BLD-MCNC: 15.6 G/DL (ref 13.5–17.5)
HOLD SPECIMEN: NORMAL
IMM GRANULOCYTES # BLD AUTO: 0.02 X10*3/UL (ref 0–0.7)
IMM GRANULOCYTES NFR BLD AUTO: 0.2 % (ref 0–0.9)
INR PPP: 2.1 (ref 0.9–1.2)
KETONES UR STRIP.AUTO-MCNC: NEGATIVE MG/DL
LEUKOCYTE ESTERASE UR QL STRIP.AUTO: NEGATIVE
LYMPHOCYTES # BLD AUTO: 0.81 X10*3/UL (ref 1.2–4.8)
LYMPHOCYTES NFR BLD AUTO: 10 %
MCH RBC QN AUTO: 30.2 PG (ref 26–34)
MCHC RBC AUTO-ENTMCNC: 32.6 G/DL (ref 32–36)
MCV RBC AUTO: 93 FL (ref 80–100)
MONOCYTES # BLD AUTO: 0.45 X10*3/UL (ref 0.1–1)
MONOCYTES NFR BLD AUTO: 5.6 %
NEUTROPHILS # BLD AUTO: 6.63 X10*3/UL (ref 1.2–7.7)
NEUTROPHILS NFR BLD AUTO: 82.3 %
NITRITE UR QL STRIP.AUTO: NEGATIVE
NRBC BLD-RTO: 0 /100 WBCS (ref 0–0)
NT-PROBNP SERPL-MCNC: 97 PG/ML (ref 0–229)
PH UR STRIP.AUTO: 7 [PH]
PLATELET # BLD AUTO: 238 X10*3/UL (ref 150–450)
PMV BLD AUTO: 10.8 FL (ref 7.5–11.5)
POTASSIUM SERPL-SCNC: 4 MMOL/L (ref 3.4–5.1)
PROT UR STRIP.AUTO-MCNC: NEGATIVE MG/DL
PROTHROMBIN TIME: 21.4 SECONDS (ref 9.3–12.7)
RBC # BLD AUTO: 5.16 X10*6/UL (ref 4.5–5.9)
RBC # UR STRIP.AUTO: ABNORMAL /UL
RBC #/AREA URNS AUTO: ABNORMAL /HPF
SARS-COV-2 RNA RESP QL NAA+PROBE: NOT DETECTED
SODIUM SERPL-SCNC: 138 MMOL/L (ref 133–145)
SP GR UR STRIP.AUTO: 1.01
TROPONIN T SERPL-MCNC: 13 NG/L
TROPONIN T SERPL-MCNC: 14 NG/L
UROBILINOGEN UR STRIP.AUTO-MCNC: NORMAL MG/DL
WBC # BLD AUTO: 8.1 X10*3/UL (ref 4.4–11.3)
WBC #/AREA URNS AUTO: ABNORMAL /HPF

## 2023-10-30 PROCEDURE — 93010 ELECTROCARDIOGRAM REPORT: CPT | Performed by: INTERNAL MEDICINE

## 2023-10-30 PROCEDURE — 96365 THER/PROPH/DIAG IV INF INIT: CPT | Performed by: STUDENT IN AN ORGANIZED HEALTH CARE EDUCATION/TRAINING PROGRAM

## 2023-10-30 PROCEDURE — 83880 ASSAY OF NATRIURETIC PEPTIDE: CPT | Performed by: STUDENT IN AN ORGANIZED HEALTH CARE EDUCATION/TRAINING PROGRAM

## 2023-10-30 PROCEDURE — 85610 PROTHROMBIN TIME: CPT | Performed by: STUDENT IN AN ORGANIZED HEALTH CARE EDUCATION/TRAINING PROGRAM

## 2023-10-30 PROCEDURE — 2500000004 HC RX 250 GENERAL PHARMACY W/ HCPCS (ALT 636 FOR OP/ED): Performed by: EMERGENCY MEDICINE

## 2023-10-30 PROCEDURE — 99291 CRITICAL CARE FIRST HOUR: CPT | Performed by: STUDENT IN AN ORGANIZED HEALTH CARE EDUCATION/TRAINING PROGRAM

## 2023-10-30 PROCEDURE — 81001 URINALYSIS AUTO W/SCOPE: CPT | Performed by: STUDENT IN AN ORGANIZED HEALTH CARE EDUCATION/TRAINING PROGRAM

## 2023-10-30 PROCEDURE — 96361 HYDRATE IV INFUSION ADD-ON: CPT

## 2023-10-30 PROCEDURE — G0378 HOSPITAL OBSERVATION PER HR: HCPCS

## 2023-10-30 PROCEDURE — 36415 COLL VENOUS BLD VENIPUNCTURE: CPT | Performed by: STUDENT IN AN ORGANIZED HEALTH CARE EDUCATION/TRAINING PROGRAM

## 2023-10-30 PROCEDURE — 96375 TX/PRO/DX INJ NEW DRUG ADDON: CPT

## 2023-10-30 PROCEDURE — 2500000002 HC RX 250 W HCPCS SELF ADMINISTERED DRUGS (ALT 637 FOR MEDICARE OP, ALT 636 FOR OP/ED): Performed by: STUDENT IN AN ORGANIZED HEALTH CARE EDUCATION/TRAINING PROGRAM

## 2023-10-30 PROCEDURE — 96365 THER/PROPH/DIAG IV INF INIT: CPT

## 2023-10-30 PROCEDURE — 71045 X-RAY EXAM CHEST 1 VIEW: CPT

## 2023-10-30 PROCEDURE — 85025 COMPLETE CBC W/AUTO DIFF WBC: CPT | Performed by: STUDENT IN AN ORGANIZED HEALTH CARE EDUCATION/TRAINING PROGRAM

## 2023-10-30 PROCEDURE — 84484 ASSAY OF TROPONIN QUANT: CPT | Performed by: CLINICAL NURSE SPECIALIST

## 2023-10-30 PROCEDURE — 87636 SARSCOV2 & INF A&B AMP PRB: CPT | Performed by: STUDENT IN AN ORGANIZED HEALTH CARE EDUCATION/TRAINING PROGRAM

## 2023-10-30 PROCEDURE — 96361 HYDRATE IV INFUSION ADD-ON: CPT | Performed by: STUDENT IN AN ORGANIZED HEALTH CARE EDUCATION/TRAINING PROGRAM

## 2023-10-30 PROCEDURE — 80048 BASIC METABOLIC PNL TOTAL CA: CPT | Performed by: STUDENT IN AN ORGANIZED HEALTH CARE EDUCATION/TRAINING PROGRAM

## 2023-10-30 PROCEDURE — 99292 CRITICAL CARE ADDL 30 MIN: CPT | Performed by: EMERGENCY MEDICINE

## 2023-10-30 PROCEDURE — 2500000004 HC RX 250 GENERAL PHARMACY W/ HCPCS (ALT 636 FOR OP/ED): Performed by: STUDENT IN AN ORGANIZED HEALTH CARE EDUCATION/TRAINING PROGRAM

## 2023-10-30 PROCEDURE — 96366 THER/PROPH/DIAG IV INF ADDON: CPT

## 2023-10-30 PROCEDURE — 96366 THER/PROPH/DIAG IV INF ADDON: CPT | Performed by: STUDENT IN AN ORGANIZED HEALTH CARE EDUCATION/TRAINING PROGRAM

## 2023-10-30 PROCEDURE — 84484 ASSAY OF TROPONIN QUANT: CPT | Performed by: STUDENT IN AN ORGANIZED HEALTH CARE EDUCATION/TRAINING PROGRAM

## 2023-10-30 PROCEDURE — 96375 TX/PRO/DX INJ NEW DRUG ADDON: CPT | Performed by: STUDENT IN AN ORGANIZED HEALTH CARE EDUCATION/TRAINING PROGRAM

## 2023-10-30 RX ORDER — FUROSEMIDE 10 MG/ML
40 INJECTION INTRAMUSCULAR; INTRAVENOUS ONCE
Status: COMPLETED | OUTPATIENT
Start: 2023-10-30 | End: 2023-10-31

## 2023-10-30 RX ORDER — ALBUTEROL SULFATE 0.83 MG/ML
2.5 SOLUTION RESPIRATORY (INHALATION) EVERY 4 HOURS PRN
Status: DISCONTINUED | OUTPATIENT
Start: 2023-10-30 | End: 2023-11-03 | Stop reason: HOSPADM

## 2023-10-30 RX ORDER — ACETAMINOPHEN 160 MG/5ML
650 SOLUTION ORAL EVERY 4 HOURS PRN
Status: DISCONTINUED | OUTPATIENT
Start: 2023-10-30 | End: 2023-11-03 | Stop reason: HOSPADM

## 2023-10-30 RX ORDER — PROCHLORPERAZINE 25 MG/1
25 SUPPOSITORY RECTAL EVERY 12 HOURS PRN
Status: DISCONTINUED | OUTPATIENT
Start: 2023-10-30 | End: 2023-11-03 | Stop reason: HOSPADM

## 2023-10-30 RX ORDER — FUROSEMIDE 40 MG/1
40 TABLET ORAL DAILY
Status: DISCONTINUED | OUTPATIENT
Start: 2023-10-31 | End: 2023-11-03 | Stop reason: HOSPADM

## 2023-10-30 RX ORDER — DOCUSATE SODIUM 100 MG/1
100 CAPSULE, LIQUID FILLED ORAL 2 TIMES DAILY
Status: DISCONTINUED | OUTPATIENT
Start: 2023-10-30 | End: 2023-11-03 | Stop reason: HOSPADM

## 2023-10-30 RX ORDER — DIGOXIN 125 MCG
125 TABLET ORAL DAILY
Status: DISCONTINUED | OUTPATIENT
Start: 2023-10-31 | End: 2023-11-03 | Stop reason: HOSPADM

## 2023-10-30 RX ORDER — ACETAMINOPHEN 325 MG/1
650 TABLET ORAL EVERY 4 HOURS PRN
Status: DISCONTINUED | OUTPATIENT
Start: 2023-10-30 | End: 2023-11-03 | Stop reason: HOSPADM

## 2023-10-30 RX ORDER — ACETAMINOPHEN 650 MG/1
650 SUPPOSITORY RECTAL EVERY 4 HOURS PRN
Status: DISCONTINUED | OUTPATIENT
Start: 2023-10-30 | End: 2023-11-03 | Stop reason: HOSPADM

## 2023-10-30 RX ORDER — WARFARIN 2 MG/1
2 TABLET ORAL DAILY
Status: DISCONTINUED | OUTPATIENT
Start: 2023-10-31 | End: 2023-11-03 | Stop reason: HOSPADM

## 2023-10-30 RX ORDER — MONTELUKAST SODIUM 10 MG/1
5 TABLET ORAL NIGHTLY
Status: DISCONTINUED | OUTPATIENT
Start: 2023-10-30 | End: 2023-11-03 | Stop reason: HOSPADM

## 2023-10-30 RX ORDER — PREDNISONE 10 MG/1
10 TABLET ORAL DAILY
Status: DISCONTINUED | OUTPATIENT
Start: 2023-11-06 | End: 2023-11-03 | Stop reason: HOSPADM

## 2023-10-30 RX ORDER — PROCHLORPERAZINE MALEATE 10 MG
10 TABLET ORAL EVERY 6 HOURS PRN
Status: DISCONTINUED | OUTPATIENT
Start: 2023-10-30 | End: 2023-11-03 | Stop reason: HOSPADM

## 2023-10-30 RX ORDER — PREDNISONE 5 MG/1
5 TABLET ORAL DAILY
Status: DISCONTINUED | OUTPATIENT
Start: 2023-11-09 | End: 2023-11-03 | Stop reason: HOSPADM

## 2023-10-30 RX ORDER — DIGOXIN 0.25 MG/ML
125 INJECTION INTRAMUSCULAR; INTRAVENOUS ONCE
Status: COMPLETED | OUTPATIENT
Start: 2023-10-30 | End: 2023-10-30

## 2023-10-30 RX ORDER — ATORVASTATIN CALCIUM 10 MG/1
10 TABLET, FILM COATED ORAL NIGHTLY
Status: DISCONTINUED | OUTPATIENT
Start: 2023-10-30 | End: 2023-11-03 | Stop reason: HOSPADM

## 2023-10-30 RX ORDER — IPRATROPIUM BROMIDE AND ALBUTEROL SULFATE 2.5; .5 MG/3ML; MG/3ML
3 SOLUTION RESPIRATORY (INHALATION) EVERY 20 MIN
Status: COMPLETED | OUTPATIENT
Start: 2023-10-30 | End: 2023-10-30

## 2023-10-30 RX ORDER — PREDNISONE 20 MG/1
20 TABLET ORAL DAILY
Status: COMPLETED | OUTPATIENT
Start: 2023-10-31 | End: 2023-11-02

## 2023-10-30 RX ORDER — PROCHLORPERAZINE EDISYLATE 5 MG/ML
10 INJECTION INTRAMUSCULAR; INTRAVENOUS EVERY 6 HOURS PRN
Status: DISCONTINUED | OUTPATIENT
Start: 2023-10-30 | End: 2023-11-03 | Stop reason: HOSPADM

## 2023-10-30 RX ORDER — BUDESONIDE 0.5 MG/2ML
0.5 INHALANT ORAL
Status: DISCONTINUED | OUTPATIENT
Start: 2023-10-30 | End: 2023-11-03 | Stop reason: HOSPADM

## 2023-10-30 RX ORDER — DILTIAZEM HCL/D5W 125 MG/125
5-15 PLASTIC BAG, INJECTION (ML) INTRAVENOUS CONTINUOUS
Status: DISCONTINUED | OUTPATIENT
Start: 2023-10-30 | End: 2023-10-31

## 2023-10-30 RX ADMIN — SODIUM CHLORIDE 500 ML: 900 INJECTION, SOLUTION INTRAVENOUS at 16:13

## 2023-10-30 RX ADMIN — DIGOXIN 125 MCG: 0.25 INJECTION INTRAMUSCULAR; INTRAVENOUS at 18:49

## 2023-10-30 RX ADMIN — Medication 5 MG/HR: at 20:31

## 2023-10-30 RX ADMIN — IPRATROPIUM BROMIDE AND ALBUTEROL SULFATE 3 ML: 2.5; .5 SOLUTION RESPIRATORY (INHALATION) at 16:12

## 2023-10-30 RX ADMIN — METHYLPREDNISOLONE SODIUM SUCCINATE 125 MG: 125 INJECTION, POWDER, FOR SOLUTION INTRAMUSCULAR; INTRAVENOUS at 16:12

## 2023-10-30 RX ADMIN — Medication 15 MG/HR: at 20:50

## 2023-10-30 RX ADMIN — Medication 10 MG/HR: at 20:41

## 2023-10-30 RX ADMIN — IPRATROPIUM BROMIDE AND ALBUTEROL SULFATE 3 ML: 2.5; .5 SOLUTION RESPIRATORY (INHALATION) at 16:47

## 2023-10-30 RX ADMIN — IPRATROPIUM BROMIDE AND ALBUTEROL SULFATE 3 ML: 2.5; .5 SOLUTION RESPIRATORY (INHALATION) at 16:27

## 2023-10-30 ASSESSMENT — LIFESTYLE VARIABLES
HAVE YOU EVER FELT YOU SHOULD CUT DOWN ON YOUR DRINKING: NO
EVER HAD A DRINK FIRST THING IN THE MORNING TO STEADY YOUR NERVES TO GET RID OF A HANGOVER: NO
HAVE PEOPLE ANNOYED YOU BY CRITICIZING YOUR DRINKING: NO
REASON UNABLE TO ASSESS: NO
EVER FELT BAD OR GUILTY ABOUT YOUR DRINKING: NO

## 2023-10-30 ASSESSMENT — COGNITIVE AND FUNCTIONAL STATUS - GENERAL
CLIMB 3 TO 5 STEPS WITH RAILING: A LITTLE
MOBILITY SCORE: 23
DAILY ACTIVITIY SCORE: 24

## 2023-10-30 ASSESSMENT — COLUMBIA-SUICIDE SEVERITY RATING SCALE - C-SSRS
2. HAVE YOU ACTUALLY HAD ANY THOUGHTS OF KILLING YOURSELF?: NO
1. IN THE PAST MONTH, HAVE YOU WISHED YOU WERE DEAD OR WISHED YOU COULD GO TO SLEEP AND NOT WAKE UP?: NO
6. HAVE YOU EVER DONE ANYTHING, STARTED TO DO ANYTHING, OR PREPARED TO DO ANYTHING TO END YOUR LIFE?: NO

## 2023-10-30 ASSESSMENT — PAIN - FUNCTIONAL ASSESSMENT
PAIN_FUNCTIONAL_ASSESSMENT: 0-10
PAIN_FUNCTIONAL_ASSESSMENT: 0-10

## 2023-10-30 ASSESSMENT — PAIN SCALES - GENERAL
PAINLEVEL_OUTOF10: 0 - NO PAIN

## 2023-10-30 NOTE — ED PROVIDER NOTES
Department of Emergency Medicine   ED  Provider Note  Admit Date/RoomTime: 10/30/2023  3:42 PM  [facility]        History of Present Illness:  Chief Complaint   Patient presents with    Shortness of Breath         Reggie Munoz is a 72 y.o. male congestive heart failure, COPD, hyperlipidemia ,paroxysmal A-fib on Coumadin presenting to the ED for increased shortness of.  Patient reports since last night he has been having to use oxygen at home because he was so short of breath.  Patient is a very poor historian.  He denies any fever chills.  Complains of cough abdominal pain nausea or vomiting no chest pain.  Reports he had increased wheezing since yesterday.,  Denies pain denies any increased swelling in his legs no increased weight gain    Review of Systems:   Pertinent positives and negatives are stated within HPI, all other systems reviewed and are negative.        --------------------------------------------- PAST HISTORY ---------------------------------------------  Past Medical History:  has a past medical history of A-fib (CMS/[facility]), COPD (chronic obstructive pulmonary disease) (CMS/[facility]), Hyperlipidemia, Hypertension, Osteopenia, Personal history of other diseases of the circulatory system, Personal history of other diseases of the circulatory system, Personal history of other diseases of the respiratory system, and Unspecified asthma with (acute) exacerbation.  Past Surgical History:  has a past surgical history that includes Other surgical history (07/23/2021) and IR angiogram pulmonary (Bilateral, 4/10/2012).  Social History:  reports that he has never smoked. He has never used smokeless tobacco. He reports that he does not currently use alcohol. He reports that he does not use drugs.  Family History: family history includes Lung cancer in his father; Stroke in his mother and sister.. Unless otherwise noted, family history is non contributory  The patient’s home medications have been  "reviewed.  Allergies: Patient has no known allergies.        ---------------------------------------------------PHYSICAL EXAM--------------------------------------    GENERAL APPEARANCE: Awake and alert.   VITAL SIGNS: As per the nurses' triage record.   HEENT: Normocephalic, atraumatic. Extraocular muscles are intact. Pupils equal round and reactive to light. Conjunctiva are pink. Negative scleral icterus. Mucous membranes are moist. Tongue in the midline. Pharynx was without erythema or exudates, uvula midline  NECK: Soft Nontender and supple, full gross ROM, no meningeal signs.  CHEST: Scattered wheezing.  No use of accessory muscles or nasal flaring noted.  No pursed breathing or tripod positioning noted.  Patient was 90% on room air placed on 2 L nasal cannula pulse ox improved to 94%.  Audible crackles noted with breathing.  No crackles noted on lung fields with auscultation  HEART: S1, S2.  Irregular rate and rhythm. No murmurs, gallops or rubs.  Strong and equal pulses in the extremities.   ABDOMEN: Soft, nontender, nondistended, positive bowel sounds, no palpable masses.  MUSCULCSKELETAL: The calves are nontender to palpation. Full gross active range of motion. Ambulating on own with no acute difficulties.  +1 pitting edema bilateral calves.  2 pitting edema bilateral feet peripheral pulses intact.  NEUROLOGICAL: Awake, alert and oriented x 3. Power intact in the upper and lower extremities. Sensation is intact to light touch in the upper and lower extremities.   IMMUNOLOGICAL: No lymphatic streaking noted   DERM: No petechiae, rashes, or ecchymoses.          ------------------------- NURSING NOTES AND VITALS REVIEWED ---------------------------  The nursing notes within the ED encounter and vital signs as below have been reviewed by myself  BP (!) 155/102   Pulse (!) 132   Temp 36.3 °C (97.3 °F)   Resp 17   Ht 1.702 m (5' 7\")   Wt 82.6 kg (182 lb)   SpO2 94%   BMI 28.51 kg/m²     Oxygen Saturation " Interpretation: Abnormal    The cardiac monitor revealed atrial fibrillation with a heart rate in the 80s as interpreted by me. The cardiac monitor was ordered secondary to the patient's heart rate and to monitor the patient for dysrhythmia.       The patient’s available past medical records and past encounters were reviewed.          -----------------------DIAGNOSTIC RESULTS------------------------  LABS:    Labs Reviewed   CBC WITH AUTO DIFFERENTIAL - Abnormal       Result Value    WBC 8.1      nRBC 0.0      RBC 5.16      Hemoglobin 15.6      Hematocrit 47.8      MCV 93      MCH 30.2      MCHC 32.6      RDW 14.5      Platelets 238      MPV 10.8      Neutrophils % 82.3      Immature Granulocytes %, Automated 0.2      Lymphocytes % 10.0      Monocytes % 5.6      Eosinophils % 1.2      Basophils % 0.7      Neutrophils Absolute 6.63      Immature Granulocytes Absolute, Automated 0.02      Lymphocytes Absolute 0.81 (*)     Monocytes Absolute 0.45      Eosinophils Absolute 0.10      Basophils Absolute 0.06     URINALYSIS WITH REFLEX MICROSCOPIC AND CULTURE - Abnormal    Color, Urine Light-Yellow      Appearance, Urine Clear      Specific Gravity, Urine 1.014      pH, Urine 7.0      Protein, Urine NEGATIVE      Glucose, Urine Normal      Blood, Urine 0.03 (TRACE) (*)     Ketones, Urine NEGATIVE      Bilirubin, Urine NEGATIVE      Urobilinogen, Urine Normal      Nitrite, Urine NEGATIVE      Leukocyte Esterase, Urine NEGATIVE     PROTIME-INR - Abnormal    Protime 21.4 (*)     INR 2.1 (*)     Narrative:     INR Therapeutic Range: 2.0-3.5   URINALYSIS MICROSCOPIC WITH REFLEX CULTURE - Abnormal    WBC, Urine NONE      RBC, Urine 6-10 (*)    BASIC METABOLIC PANEL - Normal    Glucose 94      Sodium 138      Potassium 4.0      Chloride 98      Bicarbonate 31      Urea Nitrogen 24      Creatinine 0.90      eGFR >90      Calcium 9.4      Anion Gap 9     SARS-COV-2 PCR, SYMPTOMATIC - Normal    Coronavirus 2019, PCR Not Detected       Narrative:     This assay has received FDA Emergency Use Authorization (EUA) and is only authorized for the duration of time that circumstances exist to justify the authorization of the emergency use of in vitro diagnostic tests for the detection of SARS-CoV-2 virus and/or diagnosis of COVID-19 infection under section 564(b)(1) of the Act, 21 U.S.C. 360bbb-3(b)(1). This assay is an in vitro diagnostic nucleic acid amplification test for the qualitative detection of SARS-CoV-2 from nasopharyngeal specimens and has been validated for use at [facility]. Negative results do not preclude COVID-19 infections and should not be used as the sole basis for diagnosis, treatment, or other management decisions.     INFLUENZA A AND B PCR - Normal    Flu A Result Not Detected      Flu B Result Not Detected      Narrative:     This assay is an in vitro diagnostic multiplex nucleic acid amplification test for the detection and discrimination of Influenza A & B from nasopharyngeal specimens, and has been validated for use at [facility]. Negative results do not preclude Influenza A/B infections, and should not be used as the sole basis for diagnosis, treatment, or other management decisions. If Influenza A/B and RSV PCR results are negative, testing for Parainfluenza virus, Adenovirus and Metapneumovirus is routinely performed for [facility] [place] [facility]ology and intensive care inpatients, and is available on other patients by placing an add-on request.   N-TERMINAL PROBNP - Normal    PROBNP 97      Narrative:     Reference ranges are based on clinical submission data. These ranges represent the 95th percentile of normal cut-off points. As NT Pro- BNP values approach 1000 pg/ml, clinical symptoms are more likely associated with CHF.   SERIAL TROPONIN, INITIAL ([facility]) - Normal    Troponin T, High Sensitivity 14     TROPONIN T SERIES, HIGH SENSITIVITY (0, 2 HR, 6 HR)    Narrative:     The  following orders were created for panel order Troponin T Series, High Sensitivity (0, 2HR, 6HR).  Procedure                               Abnormality         Status                     ---------                               -----------         ------                     Serial Troponin, Initial...[[id number]]  Normal              Final result                 Please view results for these tests on the individual orders.   URINALYSIS WITH REFLEX MICROSCOPIC AND CULTURE    Narrative:     The following orders were created for panel order Urinalysis with Reflex Microscopic and Culture.  Procedure                               Abnormality         Status                     ---------                               -----------         ------                     Urinalysis with Reflex M...[[id number]]  Abnormal            Final result               Extra Urine Gray Tube[[id number]]                            In process                   Please view results for these tests on the individual orders.   EXTRA URINE GRAY TUBE   TROPONIN T, HIGH SENSITIVITY       As interpreted by me, the above displayed labs are abnormal. All other labs obtained during this visit were within normal range or not returned as of this dictation.      EKG Interpretation  Per attending note Dr. Rene   sinus rhythm of 76 bpm, frequent PVCs, no ST changes to suggest ischemia, normal axis, normal intervals.        XR chest 1 view   Final Result   1. Redemonstrated blunting of the left lateral costophrenic angle,   which could be due to trace pleural effusion.        2. Minimal streaky bibasilar opacities, likely reflecting atelectasis.        3. No new focal consolidation. No visible pneumothorax.        If clinically warranted, dedicated chest CT could be obtained for   further evaluation.        MACRO:   None.        Signed by: Nirmal Bone 10/30/2023 4:59 PM   Dictation workstation:   [id number]              XR chest 1 view   Final Result   1.  Redemonstrated blunting of the left lateral costophrenic angle,   which could be due to trace pleural effusion.        2. Minimal streaky bibasilar opacities, likely reflecting atelectasis.        3. No new focal consolidation. No visible pneumothorax.        If clinically warranted, dedicated chest CT could be obtained for   further evaluation.        MACRO:   None.        Signed by: Nirmal Bone 10/30/2023 4:59 PM   Dictation workstation:   [id number]              ------------------------------ ED COURSE/MEDICAL DECISION MAKING----------------------  Medical Decision Making:   Exam: A medically appropriate exam performed, outlined above, given the known history and presentation.    History obtained from: Review of medical record nursing notes patient although patient is a very poor historian      Social Determinants of Health considered during this visit: Lives at home alone      PAST MEDICAL HISTORY/Chronic Conditions Affecting Care     has a past medical history of A-fib (CMS/[facility]), COPD (chronic obstructive pulmonary disease) (CMS/[facility]), Hyperlipidemia, Hypertension, Osteopenia, Personal history of other diseases of the circulatory system, Personal history of other diseases of the circulatory system, Personal history of other diseases of the respiratory system, and Unspecified asthma with (acute) exacerbation.       CC/HPI Summary, Social Determinants of health, Records Reviewed, DDx, testing done/not done, ED Course, Reassessment, disposition considerations/shared decision making with patient, consults, disposition:   Presents for increasing shortness of breath wheezing  Plan:  DuoNeb  EKG  BMP  CBC  Influenza  COVID  Urine  proBNP  INR PT   trop  Chest x-ray-1. Redemonstrated blunting of the left lateral costophrenic angle,  which could be due to trace pleural effusion.      2. Minimal streaky bibasilar opacities, likely reflecting atelectasis.      3. No new focal consolidation. No visible pneumothorax.       Medical Decision Making/Differential Diagnosis:  Differentials include but not limited to viral illness versus COVID versus flu versus pneumonia versus CHF exacerbation versus acute coronary syndrome  Glucose 94  Electrolytes within normal limits  BUN 24 with creatinine 0.9  proBNP 97  Flu negative  COVID-negative  Urine is not consistent with UTI  INR 2.1  Troponin pending  Patient presented with shortness of breath using oxygen at home because of the shortness of breath.  Initially had diminished breath sounds with wheezing.  These have improved with 3 breathing treatments.  However patient has now flipped into atrial fibs at a rate of 1 15-1 30.  Receiving IV fluids and we will reevaluate.  Troponin pending.  proBNP is not elevated.  Normal creatinine.  LFTs within normal limits patient is not hypoglycemic.  COVID flu negative.  Chest x-ray showed blunting of the left lateral condyle stroke chronic ankle may be due to trace pleural effusion.  Mild streaky bibasilar opacities likely affecting atelectasis no new focal consolidation and no visible pneumothorax.  INR 2.1  Patient seen evaluated with attending physician Dr. Rene.  Parts of this chart  have been completed using voice recognition software.  Please excuse any errors of transcription.  Despite the medical decision-making time stamp above medical decision making has taken place during the patient's entire visit.  Laboratory data still pending at this time reevaluation pending report given to Dr. Forte will be assuming care for reevaluation final impression disposition.  PROCEDURES  Unless otherwise noted below, none      CONSULTS:   None      ED Course as of 10/30/23 1806   Mon Oct 30, 2023   1705 EKG presented to me at 5:05 PM     EKG as interpreted by me shows sinus rhythm of 76 bpm, frequent PVCs, no ST changes to suggest ischemia, normal axis, normal intervals.   [DH]   1725 Chest x-ray showed redemonstration blunting of the left lateral  costophrenic angle may be due to a trace pleural effusion.  Mild streaky bibasilar opacities noted no new focal consolidation recommended CT if clinically indicated. [DH]   1802 Patient resting in bed.  Received 3 breathing treatment.  Lungs are clear.  Patient is tachycardic at this time A-fib.  Receiving IV fluids.  Repeat troponin pending.  If heart rate improves patient is not tachypneic will be able to go home.  Impression COPD with viral illness with a fib  [TB]      ED Course User Index  [DH] Huy Rene MD  [TB] SHAYY Garcia-CNP         Diagnoses as of 10/30/23 1806   COPD exacerbation (CMS/HCC)   Viral URI   Atrial fibrillation, unspecified type (CMS/HCC)         This patient has remained hemodynamically stable during their ED course.      Critical Care: none        Counseling:  The emergency provider has spoken with the patient and discussed today’s results, in addition to providing specific details for the plan of care and counseling regarding the diagnosis and prognosis.  Questions are answered at this time and they are agreeable with the plan.         --------------------------------- IMPRESSION AND DISPOSITION ---------------------------------    IMPRESSION  1. COPD exacerbation (CMS/HCC)    2. Viral URI    3. Atrial fibrillation, unspecified type (CMS/HCC)        DISPOSITION  Disposition: Pending        NOTE: This report was transcribed using voice recognition software. Every effort was made to ensure accuracy; however, inadvertent computerized transcription errors may be present      RYLAND Garcia  10/30/23 1807

## 2023-10-30 NOTE — ED TRIAGE NOTES
Pt complaint of wheezing, intermittent, over approx 3 days.  Aox4.  Tried at home breathing treatment with no relief.

## 2023-10-31 ENCOUNTER — PATIENT OUTREACH (OUTPATIENT)
Dept: CARE COORDINATION | Facility: CLINIC | Age: 73
End: 2023-10-31

## 2023-10-31 LAB
ANION GAP SERPL CALC-SCNC: 12 MMOL/L
BUN SERPL-MCNC: 23 MG/DL (ref 8–25)
CALCIUM SERPL-MCNC: 8.9 MG/DL (ref 8.5–10.4)
CHLORIDE SERPL-SCNC: 99 MMOL/L (ref 97–107)
CO2 SERPL-SCNC: 30 MMOL/L (ref 24–31)
CREAT SERPL-MCNC: 0.9 MG/DL (ref 0.4–1.6)
DIGOXIN SERPL-MCNC: 0.3 NG/ML (ref 0.7–2.1)
ERYTHROCYTE [DISTWIDTH] IN BLOOD BY AUTOMATED COUNT: 14.1 % (ref 11.5–14.5)
GFR SERPL CREATININE-BSD FRML MDRD: >90 ML/MIN/1.73M*2
GLUCOSE SERPL-MCNC: 143 MG/DL (ref 65–99)
HCT VFR BLD AUTO: 46.2 % (ref 41–52)
HGB BLD-MCNC: 15.4 G/DL (ref 13.5–17.5)
INR PPP: 2.3 (ref 0.9–1.2)
MAGNESIUM SERPL-MCNC: 1.9 MG/DL (ref 1.6–3.1)
MCH RBC QN AUTO: 30.3 PG (ref 26–34)
MCHC RBC AUTO-ENTMCNC: 33.3 G/DL (ref 32–36)
MCV RBC AUTO: 91 FL (ref 80–100)
NRBC BLD-RTO: 0 /100 WBCS (ref 0–0)
PLATELET # BLD AUTO: 235 X10*3/UL (ref 150–450)
PMV BLD AUTO: 10.8 FL (ref 7.5–11.5)
POTASSIUM SERPL-SCNC: 3.9 MMOL/L (ref 3.4–5.1)
PROTHROMBIN TIME: 22.8 SECONDS (ref 9.3–12.7)
RBC # BLD AUTO: 5.08 X10*6/UL (ref 4.5–5.9)
SODIUM SERPL-SCNC: 141 MMOL/L (ref 133–145)
WBC # BLD AUTO: 8.8 X10*3/UL (ref 4.4–11.3)

## 2023-10-31 PROCEDURE — 94664 DEMO&/EVAL PT USE INHALER: CPT

## 2023-10-31 PROCEDURE — 94760 N-INVAS EAR/PLS OXIMETRY 1: CPT

## 2023-10-31 PROCEDURE — 99222 1ST HOSP IP/OBS MODERATE 55: CPT

## 2023-10-31 PROCEDURE — G0378 HOSPITAL OBSERVATION PER HR: HCPCS

## 2023-10-31 PROCEDURE — 85027 COMPLETE CBC AUTOMATED: CPT | Performed by: HOSPITALIST

## 2023-10-31 PROCEDURE — 96375 TX/PRO/DX INJ NEW DRUG ADDON: CPT | Performed by: STUDENT IN AN ORGANIZED HEALTH CARE EDUCATION/TRAINING PROGRAM

## 2023-10-31 PROCEDURE — 2500000001 HC RX 250 WO HCPCS SELF ADMINISTERED DRUGS (ALT 637 FOR MEDICARE OP)

## 2023-10-31 PROCEDURE — 94640 AIRWAY INHALATION TREATMENT: CPT

## 2023-10-31 PROCEDURE — 36415 COLL VENOUS BLD VENIPUNCTURE: CPT | Performed by: HOSPITALIST

## 2023-10-31 PROCEDURE — 85610 PROTHROMBIN TIME: CPT | Performed by: HOSPITALIST

## 2023-10-31 PROCEDURE — 83735 ASSAY OF MAGNESIUM: CPT | Performed by: HOSPITALIST

## 2023-10-31 PROCEDURE — 2500000004 HC RX 250 GENERAL PHARMACY W/ HCPCS (ALT 636 FOR OP/ED): Performed by: HOSPITALIST

## 2023-10-31 PROCEDURE — 2500000002 HC RX 250 W HCPCS SELF ADMINISTERED DRUGS (ALT 637 FOR MEDICARE OP, ALT 636 FOR OP/ED): Performed by: HOSPITALIST

## 2023-10-31 PROCEDURE — 2500000001 HC RX 250 WO HCPCS SELF ADMINISTERED DRUGS (ALT 637 FOR MEDICARE OP): Performed by: HOSPITALIST

## 2023-10-31 PROCEDURE — 80162 ASSAY OF DIGOXIN TOTAL: CPT

## 2023-10-31 PROCEDURE — 80048 BASIC METABOLIC PNL TOTAL CA: CPT | Performed by: HOSPITALIST

## 2023-10-31 RX ORDER — DILTIAZEM HYDROCHLORIDE 180 MG/1
180 CAPSULE, COATED, EXTENDED RELEASE ORAL DAILY
Status: DISCONTINUED | OUTPATIENT
Start: 2023-10-31 | End: 2023-11-03 | Stop reason: HOSPADM

## 2023-10-31 RX ADMIN — DIGOXIN 125 MCG: 125 TABLET ORAL at 08:19

## 2023-10-31 RX ADMIN — FUROSEMIDE 40 MG: 40 TABLET ORAL at 08:17

## 2023-10-31 RX ADMIN — WARFARIN SODIUM 2 MG: 2 TABLET ORAL at 18:17

## 2023-10-31 RX ADMIN — MONTELUKAST 5 MG: 10 TABLET, FILM COATED ORAL at 21:18

## 2023-10-31 RX ADMIN — DOCUSATE SODIUM 100 MG: 100 CAPSULE, LIQUID FILLED ORAL at 08:17

## 2023-10-31 RX ADMIN — TIOTROPIUM BROMIDE INHALATION SPRAY 2 PUFF: 3.12 SPRAY, METERED RESPIRATORY (INHALATION) at 09:04

## 2023-10-31 RX ADMIN — PREDNISONE 20 MG: 20 TABLET ORAL at 08:20

## 2023-10-31 RX ADMIN — BUDESONIDE INHALATION 0.5 MG: 0.5 SUSPENSION RESPIRATORY (INHALATION) at 20:31

## 2023-10-31 RX ADMIN — DOCUSATE SODIUM 100 MG: 100 CAPSULE, LIQUID FILLED ORAL at 21:18

## 2023-10-31 RX ADMIN — FUROSEMIDE 40 MG: 10 INJECTION, SOLUTION INTRAMUSCULAR; INTRAVENOUS at 00:26

## 2023-10-31 RX ADMIN — BUDESONIDE INHALATION 0.5 MG: 0.5 SUSPENSION RESPIRATORY (INHALATION) at 07:31

## 2023-10-31 RX ADMIN — DILTIAZEM HYDROCHLORIDE 180 MG: 180 CAPSULE, EXTENDED RELEASE ORAL at 08:23

## 2023-10-31 RX ADMIN — ATORVASTATIN CALCIUM 10 MG: 10 TABLET, FILM COATED ORAL at 21:18

## 2023-10-31 SDOH — SOCIAL STABILITY: SOCIAL INSECURITY: HAS ANYONE EVER THREATENED TO HURT YOUR FAMILY OR YOUR PETS?: NO

## 2023-10-31 SDOH — SOCIAL STABILITY: SOCIAL INSECURITY: WITHIN THE LAST YEAR, HAVE YOU BEEN HUMILIATED OR EMOTIONALLY ABUSED IN OTHER WAYS BY YOUR PARTNER OR EX-PARTNER?: NO

## 2023-10-31 SDOH — ECONOMIC STABILITY: INCOME INSECURITY: IN THE PAST 12 MONTHS, HAS THE ELECTRIC, GAS, OIL, OR WATER COMPANY THREATENED TO SHUT OFF SERVICE IN YOUR HOME?: NO

## 2023-10-31 SDOH — SOCIAL STABILITY: SOCIAL INSECURITY: HAVE YOU HAD THOUGHTS OF HARMING ANYONE ELSE?: NO

## 2023-10-31 SDOH — HEALTH STABILITY: MENTAL HEALTH: EXPERIENCED ANY OF THE FOLLOWING LIFE EVENTS: DEATH OF FAMILY/FRIEND

## 2023-10-31 SDOH — ECONOMIC STABILITY: FOOD INSECURITY: WITHIN THE PAST 12 MONTHS, YOU WORRIED THAT YOUR FOOD WOULD RUN OUT BEFORE YOU GOT MONEY TO BUY MORE.: NEVER TRUE

## 2023-10-31 SDOH — HEALTH STABILITY: MENTAL HEALTH
STRESS IS WHEN SOMEONE FEELS TENSE, NERVOUS, ANXIOUS, OR CAN'T SLEEP AT NIGHT BECAUSE THEIR MIND IS TROUBLED. HOW STRESSED ARE YOU?: NOT AT ALL

## 2023-10-31 SDOH — SOCIAL STABILITY: SOCIAL INSECURITY
WITHIN THE LAST YEAR, HAVE YOU BEEN KICKED, HIT, SLAPPED, OR OTHERWISE PHYSICALLY HURT BY YOUR PARTNER OR EX-PARTNER?: NO

## 2023-10-31 SDOH — ECONOMIC STABILITY: FOOD INSECURITY: WITHIN THE PAST 12 MONTHS, THE FOOD YOU BOUGHT JUST DIDN'T LAST AND YOU DIDN'T HAVE MONEY TO GET MORE.: NEVER TRUE

## 2023-10-31 SDOH — SOCIAL STABILITY: SOCIAL NETWORK: HOW OFTEN DO YOU GET TOGETHER WITH FRIENDS OR RELATIVES?: THREE TIMES A WEEK

## 2023-10-31 SDOH — SOCIAL STABILITY: SOCIAL INSECURITY: ARE YOU OR HAVE YOU BEEN THREATENED OR ABUSED PHYSICALLY, EMOTIONALLY, OR SEXUALLY BY ANYONE?: NO

## 2023-10-31 SDOH — SOCIAL STABILITY: SOCIAL INSECURITY
WITHIN THE LAST YEAR, HAVE TO BEEN RAPED OR FORCED TO HAVE ANY KIND OF SEXUAL ACTIVITY BY YOUR PARTNER OR EX-PARTNER?: NO

## 2023-10-31 SDOH — SOCIAL STABILITY: SOCIAL INSECURITY: WERE YOU ABLE TO COMPLETE ALL THE BEHAVIORAL HEALTH SCREENINGS?: YES

## 2023-10-31 SDOH — SOCIAL STABILITY: SOCIAL INSECURITY: DO YOU FEEL UNSAFE GOING BACK TO THE PLACE WHERE YOU ARE LIVING?: NO

## 2023-10-31 SDOH — SOCIAL STABILITY: SOCIAL NETWORK: IN A TYPICAL WEEK, HOW MANY TIMES DO YOU TALK ON THE PHONE WITH FAMILY, FRIENDS, OR NEIGHBORS?: THREE TIMES A WEEK

## 2023-10-31 SDOH — SOCIAL STABILITY: SOCIAL INSECURITY: DOES ANYONE TRY TO KEEP YOU FROM HAVING/CONTACTING OTHER FRIENDS OR DOING THINGS OUTSIDE YOUR HOME?: NO

## 2023-10-31 SDOH — SOCIAL STABILITY: SOCIAL INSECURITY: WITHIN THE LAST YEAR, HAVE YOU BEEN AFRAID OF YOUR PARTNER OR EX-PARTNER?: NO

## 2023-10-31 SDOH — SOCIAL STABILITY: SOCIAL INSECURITY: DO YOU FEEL ANYONE HAS EXPLOITED OR TAKEN ADVANTAGE OF YOU FINANCIALLY OR OF YOUR PERSONAL PROPERTY?: NO

## 2023-10-31 SDOH — SOCIAL STABILITY: SOCIAL INSECURITY: ABUSE: ADULT

## 2023-10-31 SDOH — SOCIAL STABILITY: SOCIAL INSECURITY: ARE THERE ANY APPARENT SIGNS OF INJURIES/BEHAVIORS THAT COULD BE RELATED TO ABUSE/NEGLECT?: NO

## 2023-10-31 ASSESSMENT — LIFESTYLE VARIABLES
HOW MANY STANDARD DRINKS CONTAINING ALCOHOL DO YOU HAVE ON A TYPICAL DAY: PATIENT DOES NOT DRINK
PRESCIPTION_ABUSE_PAST_12_MONTHS: NO
AUDIT-C TOTAL SCORE: 0
SUBSTANCE_ABUSE_PAST_12_MONTHS: NO
HOW OFTEN DO YOU HAVE A DRINK CONTAINING ALCOHOL: MONTHLY OR LESS
SKIP TO QUESTIONS 9-10: 1
AUDIT-C TOTAL SCORE: 0
AUDIT-C TOTAL SCORE: 1
SUBSTANCE_ABUSE_PAST_12_MONTHS: NO
HOW OFTEN DO YOU HAVE 6 OR MORE DRINKS ON ONE OCCASION: NEVER
SKIP TO QUESTIONS 9-10: 1
HOW MANY STANDARD DRINKS CONTAINING ALCOHOL DO YOU HAVE ON A TYPICAL DAY: PATIENT DOES NOT DRINK
AUDIT-C TOTAL SCORE: 1
PRESCIPTION_ABUSE_PAST_12_MONTHS: NO
HOW OFTEN DO YOU HAVE 6 OR MORE DRINKS ON ONE OCCASION: NEVER
HOW OFTEN DO YOU HAVE A DRINK CONTAINING ALCOHOL: NEVER

## 2023-10-31 ASSESSMENT — COGNITIVE AND FUNCTIONAL STATUS - GENERAL
CLIMB 3 TO 5 STEPS WITH RAILING: A LITTLE
MOBILITY SCORE: 23
DAILY ACTIVITIY SCORE: 24
PATIENT BASELINE BEDBOUND: NO

## 2023-10-31 ASSESSMENT — ACTIVITIES OF DAILY LIVING (ADL)
PATIENT'S MEMORY ADEQUATE TO SAFELY COMPLETE DAILY ACTIVITIES?: YES
GROOMING: INDEPENDENT
ADEQUATE_TO_COMPLETE_ADL: YES
FEEDING YOURSELF: INDEPENDENT
DRESSING YOURSELF: INDEPENDENT
TOILETING: INDEPENDENT
HEARING - LEFT EAR: FUNCTIONAL
BATHING: INDEPENDENT
HEARING - RIGHT EAR: FUNCTIONAL
LACK_OF_TRANSPORTATION: NO
WALKS IN HOME: INDEPENDENT
JUDGMENT_ADEQUATE_SAFELY_COMPLETE_DAILY_ACTIVITIES: YES

## 2023-10-31 ASSESSMENT — PATIENT HEALTH QUESTIONNAIRE - PHQ9
2. FEELING DOWN, DEPRESSED OR HOPELESS: NOT AT ALL
SUM OF ALL RESPONSES TO PHQ9 QUESTIONS 1 & 2: 0
1. LITTLE INTEREST OR PLEASURE IN DOING THINGS: NOT AT ALL

## 2023-10-31 ASSESSMENT — PAIN SCALES - GENERAL: PAINLEVEL_OUTOF10: 0 - NO PAIN

## 2023-10-31 ASSESSMENT — PAIN - FUNCTIONAL ASSESSMENT: PAIN_FUNCTIONAL_ASSESSMENT: 0-10

## 2023-10-31 NOTE — PROGRESS NOTES
[facility]. Pt states he lives alone in a two story condo, independent for mobility, utilizes friends, [employer] or [employer] for transportation. Pt wears reading glasses, has PRN home 02. Pts PCP is Dr Rajwinder Dawson, prescriptions filled through [facility] in [place] or [facility] mail order. Pt does not have a LW or DHPOA, [facility] advised on the importance of getting these documents completed and offered to assist him with this while he is in the hospital. Discussion around dc planning with the pt denying he will have any skilled needs at dc. DC Plan at this time is for the pt to return home.

## 2023-10-31 NOTE — ED PROCEDURE NOTE
Procedure  Critical Care    Performed by: Saba Phillips DO  Authorized by: Huy Rene MD    Critical care provider statement:     Critical care time (minutes):  35    Critical care was necessary to treat or prevent imminent or life-threatening deterioration of the following conditions: Atrial fibrillation with RVR.    Critical care was time spent personally by me on the following activities:  Development of treatment plan with patient or surrogate, discussions with consultants, discussions with primary provider, evaluation of patient's response to treatment, examination of patient, pulse oximetry, ordering and review of radiographic studies, ordering and review of laboratory studies, ordering and performing treatments and interventions and re-evaluation of patient's condition    I assumed direction of critical care for this patient from another provider in my specialty: yes      Care discussed with: admitting provider                 Saba Phillips DO  10/30/23 2302

## 2023-10-31 NOTE — CONSULTS
Inpatient consult to Cardiology  Consult performed by: SHAYY Torre-CNP  Consult ordered by: Molly Najera DO  Reason for consult: Atrial Fibrillation with Rapid Ventricular Response        History Of Present Illness:    Reggie Munoz is a 72 y.o. male presenting with shortness of breath.  He is current with Dr. Abad.  Patient presented to the emergency department yesterday reporting increased oxygen requirements at home as well as shortness of breath for 1 week. He denies any chest pain or pressure, diaphoresis, nausea or vomiting.  He has a past medical history of COPD, paroxysmal atrial fibrillation, hyperlipidemia, and hypertension.  On arrival to the emergency department labs were drawn his troponin were normal at 14 and 13.  Other labs include a creatinine of 0.90, sodium of 138, potassium of 4.0.  Chest x-ray was completed which showed minimal bibasilar opacities.  Patient was given 3 DuoNeb treatments in the emergency department and a dose of IV Solu-Medrol.  His breathing improved however the patient went into atrial fibrillation with rapid ventricular response he was given a single dose of IV digoxin and placed on a Cardizem drip and then admitted to the hospital for further assessment and management.     Last Recorded Vitals:  Vitals:    10/30/23 2200 10/30/23 2221 10/31/23 0300 10/31/23 0731   BP:  174/90 111/66    BP Location:  Right arm Right arm    Patient Position:  Lying Lying    Pulse:  105 62    Resp:  20 18    Temp:  36.4 °C (97.5 °F) 36.1 °C (97 °F)    TempSrc:  Temporal Temporal    SpO2:  98% 94% 93%   Weight: 81.7 kg (180 lb 1.9 oz)      Height:           Last Labs:  CBC - 10/31/2023:  5:11 AM  8.8 15.4 235    46.2      CMP - 10/31/2023:  5:11 AM  8.9 6.0 20 --- 0.7   _ 3.9 27 88      PTT - 8/15/2023: 11:42 PM  2.3   22.8 33.6     LDL Calculated   Date/Time Value Ref Range Status   02/14/2022 04:18 PM 58 (L) 65 - 130 MG/DL Final   02/24/2021 02:18 PM 75 65 - 130 MG/DL  Final   11/18/2020 01:33  (H) 65 - 130 MG/DL Final      Last I/O:  I/O last 3 completed shifts:  In: 561.2 (6.9 mL/kg) [I.V.:61.2 (0.7 mL/kg); IV Piggyback:500]  Out: 500 (6.1 mL/kg) [Urine:500 (0.2 mL/kg/hr)]  Weight: 81.7 kg       Past Medical History:  He has a past medical history of A-fib (CMS/[facility]), COPD (chronic obstructive pulmonary disease) (CMS/[facility]), Hyperlipidemia, Hypertension, Osteopenia, Personal history of other diseases of the circulatory system, Personal history of other diseases of the circulatory system, Personal history of other diseases of the respiratory system, and Unspecified asthma with (acute) exacerbation.    Past Surgical History:  He has a past surgical history that includes Other surgical history (07/23/2021) and IR angiogram pulmonary (Bilateral, 4/10/2012).      Social History:  He reports that he has never smoked. He has never used smokeless tobacco. He reports that he does not currently use alcohol. He reports that he does not use drugs.    Family History:  Family History   Problem Relation Name Age of Onset    Stroke Mother      Lung cancer Father      Stroke Sister          Allergies:  Patient has no known allergies.    Inpatient Medications:  Scheduled medications   Medication Dose Route Frequency    atorvastatin  10 mg oral Nightly    budesonide  0.5 mg nebulization BID    digoxin  125 mcg oral Daily    docusate sodium  100 mg oral BID    furosemide  40 mg oral Daily    montelukast  5 mg oral Nightly    predniSONE  20 mg oral Daily    Followed by    [START ON 11/3/2023] predniSONE  15 mg oral Daily    Followed by    [START ON 11/6/2023] predniSONE  10 mg oral Daily    Followed by    [START ON 11/9/2023] predniSONE  5 mg oral Daily    tiotropium  2 Inhalation inhalation Daily    warfarin  2 mg oral Daily     PRN medications   Medication    acetaminophen    Or    acetaminophen    Or    acetaminophen    albuterol    prochlorperazine    Or    prochlorperazine    Or     prochlorperazine     Continuous Medications   Medication Dose Last Rate    dilTIAZem  5-15 mg/hr Stopped (10/31/23 0300)     Outpatient Medications:  Current Outpatient Medications   Medication Instructions    albuterol (Ventolin HFA) 90 mcg/actuation inhaler 2 puffs, inhalation, Every 6 hours PRN    alendronate (Fosamax) 70 mg tablet Alendronate Sodium 70 MG Oral Tablet   Quantity: 4  Refills: 0        Start : 14-Dec-2020   Active    atorvastatin (Lipitor) 10 mg tablet     budesonide (Pulmicort) 0.5 mg/2 mL nebulizer solution Budesonide 0.5 MG/2ML Inhalation Suspension   Quantity: 120  Refills: 0        Start : 10-Nov-2020   Active    calcium citrate-vitamin D3 (Citracal+D) 315 mg-5 mcg (200 unit) tablet 1 tablet, oral, Daily    digoxin (Lanoxin) 125 MCG tablet TAKE 1 TABLET BY MOUTH DAILY    dilTIAZem CD (Cardizem CD) 180 mg 24 hr capsule TAKE 1 CAPSULE BY MOUTH DAILY    fluticasone (Flonase) 50 mcg/actuation nasal spray     Icy Hot Patch, lido-menthol, 4-1 % adhesive patch,medicated     ipratropium-albuteroL (Duo-Neb) 0.5-2.5 mg/3 mL nebulizer solution inhalation    ketorolac (Acular) 0.5 % ophthalmic solution Ketorolac Tromethamine 0.5 % Ophthalmic Solution   Quantity: 5  Refills: 0        Start : 6-May-2021   Active    losartan-hydrochlorothiazide (Hyzaar) 50-12.5 mg tablet 1 tablet, oral, Daily    montelukast (Singulair) 10 mg tablet     tiotropium (Spiriva with HandiHaler) 18 mcg inhalation capsule     traMADol (Ultram) 50 mg tablet     warfarin (Coumadin) 2 mg tablet Take 2 mg/kg by mouth once daily.       Physical Exam:  General: alert, oriented x3, pleasant  Heart: S1/S2, Rate 82, Rhythm regular, no s3 or s4, no murmur, thrill, or heaves at PMI.   Lungs: Expiratory wheezing noted throughout equal expansion and excursion, crackles, rhales or rhonci  Abdomen: bowel sounds x 4, soft, non-tender  Extremities: No significant edema noted       Assessment/Plan   Atrial fibrillation with rapid ventricular  response  COPD  Hypertensive Disorder  Hyperlipidemia  Medication Noncompliance    Impression and Plan: Present to the emergency department yesterday with shortness of breath x1 week.  Patient does have a history of COPD and states that he wears oxygen as needed at home.  He denies any chest pain, pressure, nausea or vomiting.  On arrival to the emergency department labs were drawn troponins were normal at 14 and 13.  An EKG was completed which showed a normal sinus rhythm with no acute ST or T wave abnormalities.  Chest x-ray was completed which showed minimal bibasilar opacities.  The patient was given 3 DuoNeb treatments in the emergency department as well as a dose of IV Solu-Medrol which improved his breathing however the patient then flipped into an atrial fibrillation with rapid ventricular response.  He does have a history of paroxysmal atrial fibrillation for which he takes warfarin for stroke risk reduction.  It does appear that he has been compliant with his home warfarin as his INR is 2.3.  Warfarin to be and managed by admitting.  Patient states that he ran out of his home Cardizem and digoxin proximately 1 month ago so he has not been compliant with these medications.  Patient was given IV digoxin in the emergency department and started on a Cardizem drip.  Early this morning he converted to a normal sinus rhythm on telemetry at which time a Cardizem drip was stopped.  Patient's blood pressures were elevated overnight however have improved this morning with his last at 111/66.  He is in a normal sinus rhythm on telemetry with rates in the 80s.  Patient appears overall euvolemic on examination.  Oxygen saturation is 93% on 2 L nasal cannula.  At this time patient appears overall stable from a cardiovascular standpoint.  The A-fib with RVR was likely due to breathing treatments received in the emergency department in combination with the patient not taking his daily medications.  I have added on a  digoxin level to his labs this morning but I would continue his home dose of oral digoxin and Cardizem at this time.    Code Status:  Full Code    I spent 60 minutes in the professional and overall care of this patient.        Lisa Vasquez, SHAYY-CNP

## 2023-10-31 NOTE — NURSING NOTE
VSS, pt denies any needs at this time, bed locked and low position. Belongings in reach. Call light in reach. Will Continue to closely monitor pt.

## 2023-10-31 NOTE — CARE PLAN
The patient's goals for the shift include      The clinical goals for the shift include      Over the shift, the patient did not make progress toward the following goals. Barriers to progression include N/A new pt. Recommendations to address these barriers include rest, and monitor heart rate and BP.

## 2023-10-31 NOTE — NURSING NOTE
Attempted to Reviewed home medications with pt, pt is not sure exactly what he takes, I did verify pharmacy with pt, [facility] in [facility]

## 2023-10-31 NOTE — PROGRESS NOTES
Reggie Munoz is a 72 y.o. male on day 0 of admission presenting with Atrial fibrillation with rapid ventricular response (CMS/HCC).      Subjective   Patient denied fever or chills.    He complained of shortness of breath.  Last night patient had paroxysmal A-fib, converted to normal sinus rhythm.           Objective     Last Recorded Vitals  /75 (BP Location: Right arm, Patient Position: Sitting)   Pulse 98   Temp 36.5 °C (97.7 °F) (Temporal)   Resp 19   Wt 81.7 kg (180 lb 1.9 oz)   SpO2 94%   Intake/Output last 3 Shifts:    Intake/Output Summary (Last 24 hours) at 10/31/2023 0914  Last data filed at 10/31/2023 0900  Gross per 24 hour   Intake 801.16 ml   Output 1100 ml   Net -298.84 ml       Admission Weight  Weight: 82.6 kg (182 lb) (10/30/23 1545)    Daily Weight  10/30/23 : 81.7 kg (180 lb 1.9 oz)    Image Results  XR chest 1 view  Narrative: Interpreted By:  Nirmal Bone,   STUDY:  XR CHEST 1 VIEW; 10/30/2023 4:43 pm      INDICATION:  Signs/Symptoms:sob shortness of breath      COMPARISON:  CTA chest and chest radiograph 09/21/2023      ACCESSION NUMBER(S):  [id number]      ORDERING CLINICIAN:  MICHAEL KELLY      TECHNIQUE:  Single AP view of the chest performed.      FINDINGS:  Cardiac size is indeterminate due to the AP projection.  Monitor leads overlie the chest.  Calcifications again seen at the aortic knob. Redemonstrated blunting  of the left lateral costophrenic angle, which could be due to trace  pleural effusion. No new focal consolidation is seen. Minimal streaky  bibasilar opacities, likely reflecting atelectasis. No visible  pneumothorax. Incidentally noted sclerotic appearance of the  posterior right 9th rib, also seen by CT 09/21/2023. Millimetric  sclerotic focus overlying the right humeral head, possible bone  island.      Impression: 1. Redemonstrated blunting of the left lateral costophrenic angle,  which could be due to trace pleural effusion.      2. Minimal streaky  bibasilar opacities, likely reflecting atelectasis.      3. No new focal consolidation. No visible pneumothorax.      If clinically warranted, dedicated chest CT could be obtained for  further evaluation.      MACRO:  None.      Signed by: Nirmal Bone 10/30/2023 4:59 PM  Dictation workstation:   [id number]      Physical Exam  General:  cooperating during physical exam.  HEENT: Pupils are equal and reactive to light and commendation , oral mucosa moist, no JVD oral mucosa is moist.  Cardiovascular: Normal sinus rhythm, no MRG.  Lungs: Scattered wheezing in both lung fields.   no crackles, no dullness to percussion.  Abdomen: No hepatosplenomegaly appreciated, soft , not tender, positive bowel sounds, positive bowel movement.  Neuro: Alert and oriented x3, strength in upper and lower extremities , sensation intact.  Psych: Patient had great insight was going on  Musculoskeletal: No swelling in lower extremities, no limitation in range of motion.  Vascular: Pulses are intact in upper and lower extremities  Skin: No petechiae, ecchymosis or other stigmata for dermatology disease.       Assessment/Plan   This patient currently has cardiac telemetry ordered; if you would like to modify or discontinue the telemetry order, click here to go to the orders activity to modify/discontinue the order.    Acute exacerbation of COPD.  Continue with 2 L oxygen  Continue with steroid inhaler, systemic steroids  Aerosol treatments  Continue with Singulair.    Atrial fibrillation with RVR.  Initially started on Cardizem drip, Cardizem drip discontinued  Continue with digoxin  Evaluated by cardiology continue with warfarin  Check PT/INR daily.    Hypertension    Hyperlipidemia    Chronic respiratory failure with hypoxia  Patient stated he is supposed to be on 2 L oxygen at home.    I expect the patient to get discharge in 24 to 48 hours.  Check CBC and BMP in AM.      Principal Problem:    Atrial fibrillation with rapid ventricular  response (CMS/HCC)  Active Problems:    COPD exacerbation (CMS/HCC)    Viral OMARI                  Anaid Juarez MD

## 2023-10-31 NOTE — H&P
History Of Present Illness  Reggie Munoz is a 72 y.o. male presenting with of breath.  Patient reports that he has had wheezing and shortness of breath for approximately the last 1 week.  Says he has had somewhat increased coughing but no sputum production.  Denies fevers or chills.  He says that earlier this morning his breathing was very bad and he got scared so he came in.  He does have oxygen at home but he only uses it as needed.  Says he was wearing it prior to coming into the emergency room this morning.  In the emergency room patient was given 3 DuoNeb treatments and a dose of IV Solu-Medrol.  His breathing improved significantly however patient's heart rate went into atrial fibrillation with RVR.  Patient says he currently feels okay.  Says his breathing is fine.  Denies any palpitations or chest pain.  Does report an unusual sensation in his chest early this morning when he was feeling very short of breath but says this is now gone.    Patient was seen by his primary care doctor earlier this month.  At that time he told her that he was not taking his medications.  She prescribed him losartan hydrochlorothiazide combination pill.  He subsequently saw Dr. Abad last week.  Unclear if he was taking his medications at that time.  Patient tells me that he does take his medications.     Past Medical History  He has a past medical history of A-fib (CMS/[facility]), COPD (chronic obstructive pulmonary disease) (CMS/[facility]), Hyperlipidemia, Hypertension, Osteopenia, Personal history of other diseases of the circulatory system, Personal history of other diseases of the circulatory system, Personal history of other diseases of the respiratory system, and Unspecified asthma with (acute) exacerbation.    Surgical History  He has a past surgical history that includes Other surgical history (07/23/2021) and IR angiogram pulmonary (Bilateral, 4/10/2012).     Social History  He reports that he has never smoked. He has never  used smokeless tobacco. He reports that he does not currently use alcohol. He reports that he does not use drugs.    Family History  Family History   Problem Relation Name Age of Onset    Stroke Mother      Lung cancer Father      Stroke Sister          Allergies  Patient has no known allergies.    Review of Systems  General: no fatigue, no malaise, no fevers/chills   HENT: no rhinorrhea, no sore throat, no ear pain   Eyes: no change in vision, denies eye pain or discharge   Lungs: + SOB, + cough, no hemoptysis   CV: + chest pain, no palpitations, + leg edema   Abd: no nausea/vomiting, no constipation/diarrhea, no abdominal pain   : no dysuria, no frequency, no nocturia, no flank pain   Endocrine: no polydipsia/polyuria, no hot or cold intolerance   Neuro: no headaches, no syncope, no seizures   MSK: no back pain, no neck pain, no joint problems   Psych: no anxiety, no depression, no hallucinations    Physical Exam  General: alert, no diaphoresis   HENT: mucous membranes moist, external ears normal, no rhinorrhea   Eyes: no icterus or injection, no discharge   Lungs: CTA BL, no wheeze or rales   Heart: Regular and fast, +2 LE edema BL   GI: abdomen soft, nontender, nondistended, BS present   MSK: no joint effusion or deformity   Skin: no rashes, erythema, or ecchymosis   Neuro: grossly normal cognition, motor strength, sensation      Last Recorded Vitals  BP (!) 147/100   Pulse (!) 118   Temp 36.3 °C (97.3 °F)   Resp 19   Wt 82.6 kg (182 lb)   SpO2 94%     Relevant Results      Scheduled medications     Continuous medications  dilTIAZem, 5-15 mg/hr, Last Rate: 15 mg/hr (10/30/23 2050)      PRN medications    Results for orders placed or performed during the hospital encounter of 10/30/23 (from the past 24 hour(s))   CBC and Auto Differential   Result Value Ref Range    WBC 8.1 4.4 - 11.3 x10*3/uL    nRBC 0.0 0.0 - 0.0 /100 WBCs    RBC 5.16 4.50 - 5.90 x10*6/uL    Hemoglobin 15.6 13.5 - 17.5 g/dL    Hematocrit  47.8 41.0 - 52.0 %    MCV 93 80 - 100 fL    MCH 30.2 26.0 - 34.0 pg    MCHC 32.6 32.0 - 36.0 g/dL    RDW 14.5 11.5 - 14.5 %    Platelets 238 150 - 450 x10*3/uL    MPV 10.8 7.5 - 11.5 fL    Neutrophils % 82.3 40.0 - 80.0 %    Immature Granulocytes %, Automated 0.2 0.0 - 0.9 %    Lymphocytes % 10.0 13.0 - 44.0 %    Monocytes % 5.6 2.0 - 10.0 %    Eosinophils % 1.2 0.0 - 6.0 %    Basophils % 0.7 0.0 - 2.0 %    Neutrophils Absolute 6.63 1.20 - 7.70 x10*3/uL    Immature Granulocytes Absolute, Automated 0.02 0.00 - 0.70 x10*3/uL    Lymphocytes Absolute 0.81 (L) 1.20 - 4.80 x10*3/uL    Monocytes Absolute 0.45 0.10 - 1.00 x10*3/uL    Eosinophils Absolute 0.10 0.00 - 0.70 x10*3/uL    Basophils Absolute 0.06 0.00 - 0.10 x10*3/uL   Basic metabolic panel   Result Value Ref Range    Glucose 94 65 - 99 mg/dL    Sodium 138 133 - 145 mmol/L    Potassium 4.0 3.4 - 5.1 mmol/L    Chloride 98 97 - 107 mmol/L    Bicarbonate 31 24 - 31 mmol/L    Urea Nitrogen 24 8 - 25 mg/dL    Creatinine 0.90 0.40 - 1.60 mg/dL    eGFR >90 >60 mL/min/1.73m*2    Calcium 9.4 8.5 - 10.4 mg/dL    Anion Gap 9 <=19 mmol/L   SARS-CoV-2 RT PCR   Result Value Ref Range    Coronavirus 2019, PCR Not Detected Not Detected   Influenza A, and B PCR   Result Value Ref Range    Flu A Result Not Detected Not Detected    Flu B Result Not Detected Not Detected   NT Pro-BNP   Result Value Ref Range    PROBNP 97 0 - 229 pg/mL   Protime-INR   Result Value Ref Range    Protime 21.4 (H) 9.3 - 12.7 seconds    INR 2.1 (H) 0.9 - 1.2   Serial Troponin, Initial ([facility])   Result Value Ref Range    Troponin T, High Sensitivity 14 <=15 ng/L   Urinalysis with Reflex Microscopic and Culture   Result Value Ref Range    Color, Urine Light-Yellow Light-Yellow, Yellow, Dark-Yellow    Appearance, Urine Clear Clear    Specific Gravity, Urine 1.014 1.005 - 1.035    pH, Urine 7.0 5.0, 5.5, 6.0, 6.5, 7.0, 7.5, 8.0    Protein, Urine NEGATIVE NEGATIVE, 10 (TRACE), 20 (TRACE) mg/dL    Glucose, Urine  Normal Normal mg/dL    Blood, Urine 0.03 (TRACE) (A) NEGATIVE    Ketones, Urine NEGATIVE NEGATIVE mg/dL    Bilirubin, Urine NEGATIVE NEGATIVE    Urobilinogen, Urine Normal Normal mg/dL    Nitrite, Urine NEGATIVE NEGATIVE    Leukocyte Esterase, Urine NEGATIVE NEGATIVE   Extra Urine Gray Tube   Result Value Ref Range    Extra Tube Hold for add-ons.    Urinalysis Microscopic   Result Value Ref Range    WBC, Urine NONE 1-5, NONE /HPF    RBC, Urine 6-10 (A) NONE, 1-2, 3-5 /HPF   Troponin T, High Sensitivity   Result Value Ref Range    Troponin T, High Sensitivity 13 <=15 ng/L          Assessment/Plan   Principal Problem:    Atrial fibrillation with rapid ventricular response (CMS/HCC)  Active Problems:    COPD exacerbation (CMS/HCC)    Viral URI    Atrial fibrillation with RVR  -Suspect this occurred as a result of breathing treatments.  Certainly hypoxia and shortness of breath could have also triggered  -Remains in A-fib RVR.  Received 1 dose of IV dig in the emergency room with no change in heart rate.  He is now on a Cardizem drip.  Heart rates are still fast.  Blood pressure is somewhat elevated.  -Continue Cardizem drip.  Continue oral digoxin.  Consult cardiology.  We will use further albuterol treatments sparingly  -Check mag  -Daily INR.  Continue warfarin    Exacerbation of COPD  Chronic respiratory failure with hypoxia  -Patient wears 2 L as needed at home.  Pulse ox is 93% on room air.  95% with 2 L present.  We will continue  -Patient no longer wheezing.  Will give prednisone taper  -Continue budesonide and tiotropium  -As needed albuterol  -Continue Singulair    Lower extremity edema  -Patient is currently taking losartan and HCTZ.  Unclear if he is compliant with this.  We will give 1 dose of IV Lasix now.  I do not feel that the patient currently has an exacerbation of heart failure.  He does however have lower extremity edema and he was given a fluid bolus in the emergency room  -Ultimately will need  discharged with continued diuretics    Hypertension  -We will hold off on losartan right now while we are adjusting medications for rate control  -As above, on Cardizem drip, Lasix x1    Hyperlipidemia  -Continue statin           Molly Najera DO

## 2023-10-31 NOTE — PROGRESS NOTES
Reggie Munoz is a 72 y.o. male on day 0 of admission presenting with Atrial fibrillation with rapid ventricular response (CMS/HCC).    Pt had been started on cardizem drip which has been discontinued as he has converted to a normal sinus rhythm this morning. Await further cardiology input. [facility] needs. Anticipate pt will dc home.

## 2023-10-31 NOTE — CARE PLAN
The patient's goals for the shift include  no sob.    The clinical goals for the shift include monitor heart rhythm, no s/s resp distress    Over the shift, the patient did not make progress toward the following goals. Barriers to progression include none. Recommendations to address these barriers include none.

## 2023-11-01 ENCOUNTER — APPOINTMENT (OUTPATIENT)
Dept: CARDIOLOGY | Facility: HOSPITAL | Age: 73
End: 2023-11-01
Payer: MEDICARE

## 2023-11-01 LAB
ANION GAP SERPL CALC-SCNC: 11 MMOL/L
ATRIAL RATE: 76 BPM
BUN SERPL-MCNC: 35 MG/DL (ref 8–25)
CALCIUM SERPL-MCNC: 8.5 MG/DL (ref 8.5–10.4)
CHLORIDE SERPL-SCNC: 100 MMOL/L (ref 97–107)
CO2 SERPL-SCNC: 32 MMOL/L (ref 24–31)
CREAT SERPL-MCNC: 1 MG/DL (ref 0.4–1.6)
ERYTHROCYTE [DISTWIDTH] IN BLOOD BY AUTOMATED COUNT: 14.6 % (ref 11.5–14.5)
GFR SERPL CREATININE-BSD FRML MDRD: 81 ML/MIN/1.73M*2
GLUCOSE SERPL-MCNC: 111 MG/DL (ref 65–99)
HCT VFR BLD AUTO: 44.3 % (ref 41–52)
HGB BLD-MCNC: 14.9 G/DL (ref 13.5–17.5)
INR PPP: 2.3 (ref 0.9–1.2)
MCH RBC QN AUTO: 30.2 PG (ref 26–34)
MCHC RBC AUTO-ENTMCNC: 33.6 G/DL (ref 32–36)
MCV RBC AUTO: 90 FL (ref 80–100)
NRBC BLD-RTO: 0 /100 WBCS (ref 0–0)
P AXIS: 82 DEGREES
P OFFSET: 197 MS
P ONSET: 150 MS
PLATELET # BLD AUTO: 229 X10*3/UL (ref 150–450)
PMV BLD AUTO: 11 FL (ref 7.5–11.5)
POTASSIUM SERPL-SCNC: 3.5 MMOL/L (ref 3.4–5.1)
PR INTERVAL: 150 MS
PROTHROMBIN TIME: 23.5 SECONDS (ref 9.3–12.7)
Q ONSET: 225 MS
QRS COUNT: 13 BEATS
QRS DURATION: 88 MS
QT INTERVAL: 386 MS
QTC CALCULATION(BAZETT): 434 MS
QTC FREDERICIA: 417 MS
R AXIS: -10 DEGREES
RBC # BLD AUTO: 4.94 X10*6/UL (ref 4.5–5.9)
SODIUM SERPL-SCNC: 143 MMOL/L (ref 133–145)
T AXIS: 82 DEGREES
T OFFSET: 418 MS
VENTRICULAR RATE: 76 BPM
WBC # BLD AUTO: 14.3 X10*3/UL (ref 4.4–11.3)

## 2023-11-01 PROCEDURE — 94640 AIRWAY INHALATION TREATMENT: CPT

## 2023-11-01 PROCEDURE — 80048 BASIC METABOLIC PNL TOTAL CA: CPT | Performed by: HOSPITALIST

## 2023-11-01 PROCEDURE — 36415 COLL VENOUS BLD VENIPUNCTURE: CPT | Performed by: HOSPITALIST

## 2023-11-01 PROCEDURE — 99231 SBSQ HOSP IP/OBS SF/LOW 25: CPT

## 2023-11-01 PROCEDURE — 2500000005 HC RX 250 GENERAL PHARMACY W/O HCPCS: Performed by: HOSPITALIST

## 2023-11-01 PROCEDURE — 97116 GAIT TRAINING THERAPY: CPT | Mod: GP

## 2023-11-01 PROCEDURE — G0378 HOSPITAL OBSERVATION PER HR: HCPCS

## 2023-11-01 PROCEDURE — 2500000001 HC RX 250 WO HCPCS SELF ADMINISTERED DRUGS (ALT 637 FOR MEDICARE OP): Performed by: HOSPITALIST

## 2023-11-01 PROCEDURE — 2500000001 HC RX 250 WO HCPCS SELF ADMINISTERED DRUGS (ALT 637 FOR MEDICARE OP)

## 2023-11-01 PROCEDURE — 97162 PT EVAL MOD COMPLEX 30 MIN: CPT | Mod: GP

## 2023-11-01 PROCEDURE — 9420000001 HC RT PATIENT EDUCATION 5 MIN

## 2023-11-01 PROCEDURE — 85027 COMPLETE CBC AUTOMATED: CPT | Performed by: HOSPITALIST

## 2023-11-01 PROCEDURE — 93005 ELECTROCARDIOGRAM TRACING: CPT

## 2023-11-01 PROCEDURE — 2500000004 HC RX 250 GENERAL PHARMACY W/ HCPCS (ALT 636 FOR OP/ED): Performed by: HOSPITALIST

## 2023-11-01 PROCEDURE — 2500000002 HC RX 250 W HCPCS SELF ADMINISTERED DRUGS (ALT 637 FOR MEDICARE OP, ALT 636 FOR OP/ED): Performed by: HOSPITALIST

## 2023-11-01 PROCEDURE — 85610 PROTHROMBIN TIME: CPT | Performed by: HOSPITALIST

## 2023-11-01 PROCEDURE — 2500000004 HC RX 250 GENERAL PHARMACY W/ HCPCS (ALT 636 FOR OP/ED)

## 2023-11-01 PROCEDURE — 97165 OT EVAL LOW COMPLEX 30 MIN: CPT | Mod: GO

## 2023-11-01 RX ORDER — POTASSIUM CHLORIDE 20 MEQ/1
40 TABLET, EXTENDED RELEASE ORAL ONCE
Status: COMPLETED | OUTPATIENT
Start: 2023-11-01 | End: 2023-11-01

## 2023-11-01 RX ADMIN — MONTELUKAST 5 MG: 10 TABLET, FILM COATED ORAL at 21:33

## 2023-11-01 RX ADMIN — TIOTROPIUM BROMIDE INHALATION SPRAY 2 PUFF: 3.12 SPRAY, METERED RESPIRATORY (INHALATION) at 08:08

## 2023-11-01 RX ADMIN — PREDNISONE 20 MG: 20 TABLET ORAL at 08:28

## 2023-11-01 RX ADMIN — WARFARIN SODIUM 2 MG: 2 TABLET ORAL at 18:11

## 2023-11-01 RX ADMIN — DOCUSATE SODIUM 100 MG: 100 CAPSULE, LIQUID FILLED ORAL at 08:28

## 2023-11-01 RX ADMIN — BUDESONIDE INHALATION 0.5 MG: 0.5 SUSPENSION RESPIRATORY (INHALATION) at 20:30

## 2023-11-01 RX ADMIN — POTASSIUM CHLORIDE 40 MEQ: 1500 TABLET, EXTENDED RELEASE ORAL at 08:46

## 2023-11-01 RX ADMIN — DIGOXIN 125 MCG: 125 TABLET ORAL at 08:28

## 2023-11-01 RX ADMIN — DILTIAZEM HYDROCHLORIDE 180 MG: 180 CAPSULE, EXTENDED RELEASE ORAL at 08:29

## 2023-11-01 RX ADMIN — BUDESONIDE INHALATION 0.5 MG: 0.5 SUSPENSION RESPIRATORY (INHALATION) at 08:04

## 2023-11-01 RX ADMIN — ATORVASTATIN CALCIUM 10 MG: 10 TABLET, FILM COATED ORAL at 21:33

## 2023-11-01 RX ADMIN — FUROSEMIDE 40 MG: 40 TABLET ORAL at 08:28

## 2023-11-01 RX ADMIN — Medication 2 L/MIN: at 08:00

## 2023-11-01 RX ADMIN — DOCUSATE SODIUM 100 MG: 100 CAPSULE, LIQUID FILLED ORAL at 21:33

## 2023-11-01 RX ADMIN — Medication 2 L/MIN: at 16:00

## 2023-11-01 ASSESSMENT — PAIN - FUNCTIONAL ASSESSMENT
PAIN_FUNCTIONAL_ASSESSMENT: 0-10

## 2023-11-01 ASSESSMENT — PAIN SCALES - GENERAL
PAINLEVEL_OUTOF10: 0 - NO PAIN

## 2023-11-01 ASSESSMENT — COGNITIVE AND FUNCTIONAL STATUS - GENERAL
DAILY ACTIVITIY SCORE: 24
DAILY ACTIVITIY SCORE: 24
CLIMB 3 TO 5 STEPS WITH RAILING: A LITTLE
CLIMB 3 TO 5 STEPS WITH RAILING: A LITTLE
MOBILITY SCORE: 23
MOBILITY SCORE: 23
CLIMB 3 TO 5 STEPS WITH RAILING: A LITTLE
MOBILITY SCORE: 23
DAILY ACTIVITIY SCORE: 24

## 2023-11-01 ASSESSMENT — ACTIVITIES OF DAILY LIVING (ADL)
BATHING_ASSISTANCE: INDEPENDENT
ADL_ASSISTANCE: INDEPENDENT
ADL_ASSISTANCE: INDEPENDENT

## 2023-11-01 NOTE — PROGRESS NOTES
Occupational Therapy    Evaluation    Patient Name: Reggie Munoz  MRN: [id number]  Today's Date: 11/1/2023  Time Calculation  Start Time: 1111  Stop Time: 1125  Time Calculation (min): 14 min    Assessment  IP OT Assessment  OT Assessment: OT order received, dequan reviewed evaluation completed, no acute OT needs identified, D/C OT  End of Session Communication: Bedside nurse  End of Session Patient Position: Up in chair  Plan:  No Skilled OT: At baseline function  OT Discharge Recommendations: No further acute OT  OT - OK to Discharge: Yes    Subjective   Current Problem:    General:  General  Reason for Referral: activities of [facility] living  Referred By: Dr. Najera  Past Medical History Relevant to Rehab: HTN, hyperlipidemia, chronic respiratory failure with hypoxia  Co-Treatment:  (eval only)  Preferred Learning Style: auditory, verbal  General Comment: Pt is a 73 yo male admit with c/o SOB  Precautions:  Hearing/Visual Limitations: [place]  Medical Precautions: Fall precautions  Precautions Comment: 2L oxygen via nasal canula  Vital Signs:  SpO2: 94 %  Pain:  Pain Assessment  Pain Assessment: 0-10  Pain Score: 0 - No pain    Objective   Cognition:  Overall Cognitive Status: Within Functional Limits    Home Living:  Type of Home: [employer]  Lives With: Alone  Home Adaptive Equipment: None  Home Layout: Two level  Home Access: Level entry  Bathroom Shower/Tub: Tub/shower unit  Bathroom Toilet: Standard  Bathroom Equipment: None   Prior Function:  Level of [place]: Independent with ADLs and functional transfers, Independent with homemaking with ambulation  Receives Help From: Friends  ADL Assistance: Independent  Homemaking Assistance: Independent  Ambulatory Assistance: Independent  Prior Function Comments: Patient utilizes [employer] for transport, friends assist as needed, reports ordering food for take out usually with limited grocery shopping  IADL History:     ADL:  Eating Assistance: Independent  Grooming  Assistance: Independent  Bathing Assistance: Independent  UE Dressing Assistance: Independent  LE Dressing Assistance: Independent  Toileting Assistance with Device: Independent  Functional Assistance: Independent  Activity Tolerance:  Endurance: Endurance does not limit participation in activity  Bed Mobility/Transfers: Bed Mobility  Bed Mobility: Yes  Bed Mobility 1  Bed Mobility 1: Supine to sitting  Level of Assistance 1: Independent   and Transfers  Transfer: Yes  Transfer 1  Transfer From 1: Stand to  Transfer to 1: Sit  Technique 1: Stand to sit  Transfer Level of Assistance 1: Independent  Trials/Comments 1: Pt ambulating independently, no LOB, takes a sesated rest break long-term, returned to chair in room       Vision: Vision - Basic Assessment  Current Vision: No visual deficits  Sensation:  Light Touch: No apparent deficits  Strength:  Strength Comments: WFL    Coordination:  Movements are Fluid and Coordinated: Yes   Hand Function:  Hand Function  Gross Grasp: Functional  Coordination: Functional  Extremities: RUE   RUE : Within Functional Limits and LUE   LUE: Within Functional Limits    Outcome Measures: [facility] Daily Activity  Putting on and taking off regular lower body clothing: None  Bathing (including washing, rinsing, drying): None  Putting on and taking off regular upper body clothing: None  Toileting, which includes using toilet, bedpan or urinal: None  Taking care of personal grooming such as brushing teeth: None  Eating Meals: None  Daily Activity - Total Score: 24      Education Documentation  No documentation found.  Education Comments  No comments found.      Goals:   none

## 2023-11-01 NOTE — PROGRESS NOTES
Reggie Munoz is a 72 y.o. male on day 0 of admission presenting with Atrial fibrillation with rapid ventricular response (CMS/HCC).      Subjective   Patient denied fever or chills.   Patient complain of having wheezing.       Objective     Last Recorded Vitals  /60 (BP Location: Right arm, Patient Position: Lying)   Pulse 77   Temp 36.4 °C (97.5 °F) (Temporal)   Resp 15   Wt 81.7 kg (180 lb 1.9 oz)   SpO2 95%   Intake/Output last 3 Shifts:    Intake/Output Summary (Last 24 hours) at 11/1/2023 1014  Last data filed at 11/1/2023 0836  Gross per 24 hour   Intake 480 ml   Output 600 ml   Net -120 ml       Admission Weight  Weight: 82.6 kg (182 lb) (10/30/23 1545)    Daily Weight  10/30/23 : 81.7 kg (180 lb 1.9 oz)    Image Results  XR chest 1 view  Narrative: Interpreted By:  Nirmal Bone,   STUDY:  XR CHEST 1 VIEW; 10/30/2023 4:43 pm      INDICATION:  Signs/Symptoms:sob shortness of breath      COMPARISON:  CTA chest and chest radiograph 09/21/2023      ACCESSION NUMBER(S):  [id number]      ORDERING CLINICIAN:  MICHAEL KELLY      TECHNIQUE:  Single AP view of the chest performed.      FINDINGS:  Cardiac size is indeterminate due to the AP projection.  Monitor leads overlie the chest.  Calcifications again seen at the aortic knob. Redemonstrated blunting  of the left lateral costophrenic angle, which could be due to trace  pleural effusion. No new focal consolidation is seen. Minimal streaky  bibasilar opacities, likely reflecting atelectasis. No visible  pneumothorax. Incidentally noted sclerotic appearance of the  posterior right 9th rib, also seen by CT 09/21/2023. Millimetric  sclerotic focus overlying the right humeral head, possible bone  island.      Impression: 1. Redemonstrated blunting of the left lateral costophrenic angle,  which could be due to trace pleural effusion.      2. Minimal streaky bibasilar opacities, likely reflecting atelectasis.      3. No new focal consolidation. No  visible pneumothorax.      If clinically warranted, dedicated chest CT could be obtained for  further evaluation.      MACRO:  None.      Signed by: Nirmal Bone 10/30/2023 4:59 PM  Dictation workstation:   [id number]      Physical Exam    General: cooperating during physical exam.  HEENT: Pupils are equal and reactive to light and commendation , oral mucosa moist, no JVD oral mucosa is moist.  Cardiovascular: Normal sinus rhythm, no MRG.  Lungs: Wheezing in both lung fields.  , no crackles, no dullness to percussion.  Abdomen: No hepatosplenomegaly appreciated, soft , not tender, positive bowel sounds, positive bowel movement.  Neuro: Alert and oriented x3, strength in upper and lower extremities , sensation intact.  Psych: Patient had great insight was going on  Musculoskeletal: No swelling in lower extremities, no limitation in range of motion.  Vascular: Pulses are intact in upper and lower extremities  Skin: No petechiae, ecchymosis or other stigmata for dermatology disease.     Assessment/Plan   This patient currently has cardiac telemetry ordered; if you would like to modify or discontinue the telemetry order, click here to go to the orders activity to modify/discontinue the order.      Acute exacerbation of COPD.  Continue with 2 L oxygen  Continue with steroid inhaler, systemic steroids  Aerosol treatments  Continue with Singulair.     Atrial fibrillation with RVR.  Heart rate fairly controlled  Initially started on Cardizem drip, Cardizem drip discontinued  Continue with digoxin, Cardizem  Evaluated by cardiology continue with warfarin  Check PT/INR daily.  PT/INR today 2.3.     Hypertension     Hyperlipidemia     Chronic respiratory failure with hypoxia  Patient stated he is supposed to be on 2 L oxygen at home.     Check CBC and BMP in AM.              Principal Problem:    Atrial fibrillation with rapid ventricular response (CMS/HCC)  Active Problems:    COPD exacerbation (CMS/HCC)    Viral  ESTER Juarez MD

## 2023-11-01 NOTE — CARE PLAN
Problem: Respiratory  Goal: Clear secretions with interventions this shift  Outcome: Progressing  Goal: Minimize anxiety/maximize coping throughout shift  Outcome: Progressing  Goal: Minimal/no exertional discomfort or dyspnea this shift  Outcome: Progressing  Goal: No signs of respiratory distress (eg. Use of accessory muscles. Peds grunting)  Outcome: Progressing  Goal: Patent airway maintained this shift  Outcome: Progressing  Goal: Tolerate mechanical ventilation evidenced by VS/agitation level this shift  Outcome: Met  Goal: Tolerate pulmonary toileting this shift  Outcome: Met  Goal: Verbalize decreased shortness of breath this shift  Outcome: Progressing  Goal: Wean oxygen to maintain O2 saturation per order/standard this shift  Outcome: Progressing  Goal: Increase self care and/or family involvement in next 24 hours  Outcome: Met

## 2023-11-01 NOTE — PROGRESS NOTES
Physical Therapy     Physical Therapy Evaluation and  Treatment    Patient Name: Reggie Munoz  MRN: [id number]  Today's Date: 11/1/2023  Time Calculation  Start Time: 1110  Stop Time: 1140  Time Calculation (min): 30 min       Assessment/Plan   PT Assessment  Rehab Prognosis: Good  Evaluation/Treatment Tolerance: Patient tolerated treatment well  Medical Staff Made Aware: Yes  Strengths: Ability to acquire knowledge, Access to adaptive/assistive products, Attitude of self  End of Session Communication: Bedside nurse  Assessment Comment: 72year old male who presents with atrial fibrillation with rapid ventricular response who is independent for mobility at [facility]. Patient demonstrates good B LE strength, good dynamic balance, and good activity tolerance. Patient states that he is at baseline, no acute PT needs at this time. Discharge PT.  End of Session Patient Position: Up in chair     PT Plan  Treatment/Interventions: Gait training  PT Plan: PT Eval only  PT Discharge Recommendations: No further acute PT  PT Recommended Transfer Status: Stand by assist (due to lines will need assistance for line management)  PT - OK to Discharge: Yes      General Visit Information:   PT  Visit  PT Received On: 11/01/23  General  Reason for Referral: impaired mobility  Referred By: Dr. Najera  Past Medical History Relevant to Rehab: HTN, hyperlipidemia, chronic respiratory failure with hypoxia  Family/Caregiver Present: No  Preferred Learning Style: auditory, verbal  General Comment: Pleasant, cooperative, agreeable for PT [facility]    Subjective   Precautions:  Precautions  Hearing/Visual Limitations: [place]  Medical Precautions: Fall precautions  Precautions Comment: 2L oxygen via nasal canula  Vital Signs:  Vital Signs  Heart Rate: 86  Heart Rate Source: Monitor    Objective   Pain:  Pain Assessment  Pain Assessment: 0-10  Pain Score: 0 - No pain  Cognition:  Cognition  Overall Cognitive Status: Within Functional Limits  Postural  Control:  Postural Control  Posture Comment: forward head, severly khyphotic  Extremity/Trunk Assessments:  RLE   RLE : Within Functional Limits  LLE   LLE : Within Functional Limits  Activity Tolerance:  Activity Tolerance  Endurance: Endurance does not limit participation in activity  Treatments:  Ambulation/Gait Training  Ambulation/Gait Training Performed: Yes  Ambulation/Gait Training 1  Surface 1: Level tile  Device 1: No device  Assistance 1: Independent  Comments/Distance (ft) 1: 150 ft x 3 with no device, takes  1 seated rest break for breath recovery. Ambulates without oxygen spO2 96% and HR 68-86 bpm. Completes turns and picks up object from floor independent.    Outcome Measures:  [facility] Basic Mobility  Turning from your back to your side while in a flat bed without using bedrails: None  Moving from lying on your back to sitting on the side of a flat bed without using bedrails: None  Moving to and from bed to chair (including a wheelchair): None  Standing up from a chair using your arms (e.g. wheelchair or bedside chair): None  To walk in hospital room: None  Climbing 3-5 steps with railing: A little  Basic Mobility - Total Score: 23    Education Documentation  Mobility Training, taught by Loulou Caldwell PT at 11/1/2023 12:06 PM.  Learner: Patient  Readiness: Eager  Method: Explanation  Response: Verbalizes Understanding  Comment: education provided re: safe mobility, energy conservation techniques, safe ambulation    Education Comments  No comments found.        OP EDUCATION:

## 2023-11-01 NOTE — PROGRESS NOTES
[facility], pt has listed his sister Lashanda oMrrison as primary POA.     DC Plan at this time is for the pt to return home at dc.

## 2023-11-01 NOTE — PROGRESS NOTES
"Reggie Munoz is a 72 y.o. male on day 0 of admission presenting with Atrial fibrillation with rapid ventricular response (CMS/HCC).    Subjective   Patient resting comfortably in bed with no new complaints overnight.        Objective     Physical Exam  General: alert, oriented x3, pleasant   Heart: S1/S2, Rate 78, Rhythm regular, no s3 or s4, no murmur, thrill, or heaves at PMI.   Lungs: Scattered wheezing throughout, equal expansion and excursion, crackles, rhales or rhonci. No significant conversational dyspnea noted.   Abdomen: bowel sounds x 4, soft, non-tender  Genitourinary: deferred   Extremities: No significant edema to the extremities.     Last Recorded Vitals  Blood pressure 140/60, pulse 77, temperature 36.4 °C (97.5 °F), temperature source Temporal, resp. rate 15, height 1.702 m (5' 7\"), weight 81.7 kg (180 lb 1.9 oz), SpO2 95 %.  Intake/Output last 3 Shifts:  I/O last 3 completed shifts:  In: 781.2 (9.6 mL/kg) [P.O.:720; I.V.:61.2 (0.7 mL/kg)]  Out: 1500 (18.4 mL/kg) [Urine:1500 (0.5 mL/kg/hr)]  Weight: 81.7 kg     Relevant Results  Results for orders placed or performed during the hospital encounter of 10/30/23 (from the past 24 hour(s))   Protime-INR   Result Value Ref Range    Protime 23.5 (H) 9.3 - 12.7 seconds    INR 2.3 (H) 0.9 - 1.2   Basic Metabolic Panel   Result Value Ref Range    Glucose 111 (H) 65 - 99 mg/dL    Sodium 143 133 - 145 mmol/L    Potassium 3.5 3.4 - 5.1 mmol/L    Chloride 100 97 - 107 mmol/L    Bicarbonate 32 (H) 24 - 31 mmol/L    Urea Nitrogen 35 (H) 8 - 25 mg/dL    Creatinine 1.00 0.40 - 1.60 mg/dL    eGFR 81 >60 mL/min/1.73m*2    Calcium 8.5 8.5 - 10.4 mg/dL    Anion Gap 11 <=19 mmol/L   CBC   Result Value Ref Range    WBC 14.3 (H) 4.4 - 11.3 x10*3/uL    nRBC 0.0 0.0 - 0.0 /100 WBCs    RBC 4.94 4.50 - 5.90 x10*6/uL    Hemoglobin 14.9 13.5 - 17.5 g/dL    Hematocrit 44.3 41.0 - 52.0 %    MCV 90 80 - 100 fL    MCH 30.2 26.0 - 34.0 pg    MCHC 33.6 32.0 - 36.0 g/dL    RDW " 14.6 (H) 11.5 - 14.5 %    Platelets 229 150 - 450 x10*3/uL    MPV 11.0 7.5 - 11.5 fL                   Assessment/Plan   Principal Problem:    Atrial fibrillation with rapid ventricular response (CMS/HCC)  Active Problems:    COPD exacerbation (CMS/HCC)    Viral URI       Impression and Plan: 10/31 Present to the emergency department yesterday with shortness of breath x1 week.  Patient does have a history of COPD and states that he wears oxygen as needed at home.  He denies any chest pain, pressure, nausea or vomiting.  On arrival to the emergency department labs were drawn troponins were normal at 14 and 13.  An EKG was completed which showed a normal sinus rhythm with no acute ST or T wave abnormalities.  Chest x-ray was completed which showed minimal bibasilar opacities.  The patient was given 3 DuoNeb treatments in the emergency department as well as a dose of IV Solu-Medrol which improved his breathing however the patient then flipped into an atrial fibrillation with rapid ventricular response.  He does have a history of paroxysmal atrial fibrillation for which he takes warfarin for stroke risk reduction.  It does appear that he has been compliant with his home warfarin as his INR is 2.3.  Warfarin to be and managed by admitting.  Patient states that he ran out of his home Cardizem and digoxin proximately 1 month ago so he has not been compliant with these medications.  Patient was given IV digoxin in the emergency department and started on a Cardizem drip.  Early this morning he converted to a normal sinus rhythm on telemetry at which time a Cardizem drip was stopped.  Patient's blood pressures were elevated overnight however have improved this morning with his last at 111/66.  He is in a normal sinus rhythm on telemetry with rates in the 80s.  Patient appears overall euvolemic on examination.  Oxygen saturation is 93% on 2 L nasal cannula.  At this time patient appears overall stable from a cardiovascular  standpoint.  The A-fib with RVR was likely due to breathing treatments received in the emergency department in combination with the patient not taking his daily medications.  I have added on a digoxin level to his labs this morning but I would continue his home dose of oral digoxin and Cardizem at this time.    11/1: Patient with improved rate control overnight.  He remains free from chest pain, pressure or palpitations.  He remains in sinus rhythm on telemetry with rates predominantly in the 70s and 80s.  Pressures have improved with his last recorded blood pressure 140/60.  He is overall euvolemic on examination.  Patient is breathing comfortably on nasal cannula oxygen with pulse oximetry of 95%.  Lab results this morning show sodium of 143, potassium 3.5, creatinine of 1.0, hemoglobin of 14.9.  We will supplement his potassium this morning with 40 mill equivalents of oral potassium.  Overall the patient continues to improve from a cardiac standpoint.  We will follow with you.          Lisa Vasquez, APRN-CNP

## 2023-11-02 LAB
ANION GAP SERPL CALC-SCNC: 11 MMOL/L
BUN SERPL-MCNC: 34 MG/DL (ref 8–25)
CALCIUM SERPL-MCNC: 8.3 MG/DL (ref 8.5–10.4)
CHLORIDE SERPL-SCNC: 99 MMOL/L (ref 97–107)
CO2 SERPL-SCNC: 31 MMOL/L (ref 24–31)
CREAT SERPL-MCNC: 1 MG/DL (ref 0.4–1.6)
ERYTHROCYTE [DISTWIDTH] IN BLOOD BY AUTOMATED COUNT: 14.4 % (ref 11.5–14.5)
GFR SERPL CREATININE-BSD FRML MDRD: 81 ML/MIN/1.73M*2
GLUCOSE SERPL-MCNC: 90 MG/DL (ref 65–99)
HCT VFR BLD AUTO: 45.2 % (ref 41–52)
HGB BLD-MCNC: 14.6 G/DL (ref 13.5–17.5)
INR PPP: 2.4 (ref 0.9–1.2)
MCH RBC QN AUTO: 30.2 PG (ref 26–34)
MCHC RBC AUTO-ENTMCNC: 32.3 G/DL (ref 32–36)
MCV RBC AUTO: 93 FL (ref 80–100)
NRBC BLD-RTO: 0 /100 WBCS (ref 0–0)
PLATELET # BLD AUTO: 230 X10*3/UL (ref 150–450)
POTASSIUM SERPL-SCNC: 3.9 MMOL/L (ref 3.4–5.1)
PROTHROMBIN TIME: 23.9 SECONDS (ref 9.3–12.7)
RBC # BLD AUTO: 4.84 X10*6/UL (ref 4.5–5.9)
SODIUM SERPL-SCNC: 141 MMOL/L (ref 133–145)
WBC # BLD AUTO: 11.1 X10*3/UL (ref 4.4–11.3)

## 2023-11-02 PROCEDURE — 36415 COLL VENOUS BLD VENIPUNCTURE: CPT | Performed by: HOSPITALIST

## 2023-11-02 PROCEDURE — 2500000004 HC RX 250 GENERAL PHARMACY W/ HCPCS (ALT 636 FOR OP/ED): Performed by: HOSPITALIST

## 2023-11-02 PROCEDURE — 2500000001 HC RX 250 WO HCPCS SELF ADMINISTERED DRUGS (ALT 637 FOR MEDICARE OP): Performed by: HOSPITALIST

## 2023-11-02 PROCEDURE — 80048 BASIC METABOLIC PNL TOTAL CA: CPT | Performed by: HOSPITALIST

## 2023-11-02 PROCEDURE — 2500000001 HC RX 250 WO HCPCS SELF ADMINISTERED DRUGS (ALT 637 FOR MEDICARE OP)

## 2023-11-02 PROCEDURE — G0378 HOSPITAL OBSERVATION PER HR: HCPCS

## 2023-11-02 PROCEDURE — 85610 PROTHROMBIN TIME: CPT | Performed by: HOSPITALIST

## 2023-11-02 PROCEDURE — 2500000002 HC RX 250 W HCPCS SELF ADMINISTERED DRUGS (ALT 637 FOR MEDICARE OP, ALT 636 FOR OP/ED): Performed by: HOSPITALIST

## 2023-11-02 PROCEDURE — 94640 AIRWAY INHALATION TREATMENT: CPT

## 2023-11-02 PROCEDURE — 2500000005 HC RX 250 GENERAL PHARMACY W/O HCPCS: Performed by: HOSPITALIST

## 2023-11-02 PROCEDURE — 85027 COMPLETE CBC AUTOMATED: CPT | Performed by: HOSPITALIST

## 2023-11-02 PROCEDURE — 9420000001 HC RT PATIENT EDUCATION 5 MIN

## 2023-11-02 PROCEDURE — 99231 SBSQ HOSP IP/OBS SF/LOW 25: CPT

## 2023-11-02 RX ADMIN — BUDESONIDE INHALATION 0.5 MG: 0.5 SUSPENSION RESPIRATORY (INHALATION) at 19:45

## 2023-11-02 RX ADMIN — MONTELUKAST 5 MG: 10 TABLET, FILM COATED ORAL at 21:39

## 2023-11-02 RX ADMIN — PREDNISONE 20 MG: 20 TABLET ORAL at 08:08

## 2023-11-02 RX ADMIN — DIGOXIN 125 MCG: 125 TABLET ORAL at 08:08

## 2023-11-02 RX ADMIN — DILTIAZEM HYDROCHLORIDE 180 MG: 180 CAPSULE, EXTENDED RELEASE ORAL at 08:08

## 2023-11-02 RX ADMIN — BUDESONIDE INHALATION 0.5 MG: 0.5 SUSPENSION RESPIRATORY (INHALATION) at 07:49

## 2023-11-02 RX ADMIN — ATORVASTATIN CALCIUM 10 MG: 10 TABLET, FILM COATED ORAL at 21:39

## 2023-11-02 RX ADMIN — Medication 2 L/MIN: at 00:00

## 2023-11-02 RX ADMIN — DOCUSATE SODIUM 100 MG: 100 CAPSULE, LIQUID FILLED ORAL at 21:39

## 2023-11-02 RX ADMIN — FUROSEMIDE 40 MG: 40 TABLET ORAL at 08:08

## 2023-11-02 RX ADMIN — Medication: at 16:00

## 2023-11-02 RX ADMIN — Medication 2 L/MIN: at 07:46

## 2023-11-02 RX ADMIN — TIOTROPIUM BROMIDE INHALATION SPRAY 2 PUFF: 3.12 SPRAY, METERED RESPIRATORY (INHALATION) at 07:46

## 2023-11-02 RX ADMIN — WARFARIN SODIUM 2 MG: 2 TABLET ORAL at 17:07

## 2023-11-02 ASSESSMENT — COGNITIVE AND FUNCTIONAL STATUS - GENERAL
MOBILITY SCORE: 23
CLIMB 3 TO 5 STEPS WITH RAILING: A LITTLE
DAILY ACTIVITIY SCORE: 24
DAILY ACTIVITIY SCORE: 24

## 2023-11-02 ASSESSMENT — PAIN - FUNCTIONAL ASSESSMENT
PAIN_FUNCTIONAL_ASSESSMENT: 0-10
PAIN_FUNCTIONAL_ASSESSMENT: WONG-BAKER FACES
PAIN_FUNCTIONAL_ASSESSMENT: 0-10
PAIN_FUNCTIONAL_ASSESSMENT: 0-10

## 2023-11-02 ASSESSMENT — PAIN SCALES - GENERAL
PAINLEVEL_OUTOF10: 0 - NO PAIN

## 2023-11-02 NOTE — PROGRESS NOTES
Reggie Munoz is a 72 y.o. male on day 0 of admission presenting with Atrial fibrillation with rapid ventricular response (CMS/HCC).      Subjective   Patient denies fever or chills.  Remains on 2 L oxygen.        Objective     Last Recorded Vitals  /70 (BP Location: Right arm, Patient Position: Lying)   Pulse 56   Temp 36.4 °C (97.5 °F) (Temporal)   Resp 19   Wt 81.7 kg (180 lb 1.9 oz)   SpO2 96%   Intake/Output last 3 Shifts:  No intake or output data in the 24 hours ending 11/02/23 0943    Admission Weight  Weight: 82.6 kg (182 lb) (10/30/23 1545)    Daily Weight  10/30/23 : 81.7 kg (180 lb 1.9 oz)    Image Results  ECG 12 lead  Sinus rhythm with occasional Premature ventricular complexes  Otherwise normal ECG  No previous ECGs available  Confirmed by Joshua Lopez ([id number]) on 11/1/2023 9:16:50 PM      Physical Exam    General: In non acute distress, cooperating during physical exam.  HEENT: Pupils are equal and reactive to light and commendation , oral mucosa moist, no JVD.  Cardiovascular: Normal sinus rhythm, no MRG.  Lungs: Wheezing  on both lung fields.    Abdomen: No hepatosplenomegaly appreciated, soft , not tender, positive bowel sounds, positive bowel movement.  Neuro: Alert and oriented x3, strength in upper and lower extremities , sensation intact.  Psych: Patient had great insight was going on  Musculoskeletal: No swelling in lower extremities, no limitation in range of motion.  Vascular: Pulses are intact in upper and lower extremities  Skin: No petechiae, ecchymosis or other stigmata for dermatology disease.     Assessment/Plan   This patient currently has cardiac telemetry ordered; if you would like to modify or discontinue the telemetry order, click here to go to the orders activity to modify/discontinue the order.    Acute exacerbation of COPD.  Continue with 2 L oxygen  Continue with steroid inhaler, systemic steroids  Aerosol treatments  Continue with Singulair.     Atrial  fibrillation with RVR.  Heart rate fairly controlled  Initially started on Cardizem drip, Cardizem drip discontinued  Continue with digoxin, Cardizem  Evaluated by cardiology continue with warfarin  Check PT/INR daily.     Hypertension     Hyperlipidemia     Chronic respiratory failure with hypoxia  Patient stated he is supposed to be on 2 L oxygen at [facility]    Expect the patient to get discharge in 24 hours.      Principal Problem:    Atrial fibrillation with rapid ventricular response (CMS/HCC)  Active Problems:    COPD exacerbation (CMS/HCC)    Viral URI                  Anaid Juarez MD

## 2023-11-02 NOTE — PROGRESS NOTES
"Reggie Munoz is a 72 y.o. male on day 4 of admission presenting with Atrial fibrillation with rapid ventricular response (CMS/HCC).    Subjective   Patient sitting up on side of bed eating breakfast.  He has no new complaints overnight.       Objective     Physical Exam  General: alert, oriented x3, pleasant   Heart: S1/S2, Rate 60, Rhythm irregular, no s3 or s4, no murmur, thrill, or heaves at PMI.   Lungs: Diminished in bilateral bases, equal expansion and excursion, no wheezes, crackles, rhales or rhonci.  No significant conversational dyspnea noted.  Abdomen: bowel sounds x 4, soft, non-tender to light and deep palpation, No masses, guarding, or CVA tenderness   Genitourinary: deferred   Extremities: No significant edema noted to the extremities.      Last Recorded Vitals  Blood pressure 137/70, pulse 56, temperature 36.4 °C (97.5 °F), temperature source Temporal, resp. rate 19, height 1.702 m (5' 7\"), weight 81.7 kg (180 lb 1.9 oz), SpO2 96 %.  Intake/Output last 3 Shifts:  I/O last 3 completed shifts:  In: - (0 mL/kg)   Out: 200 (2.4 mL/kg) [Urine:200 (0.1 mL/kg/hr)]  Weight: 81.7 kg     Relevant Results  Results for orders placed or performed during the hospital encounter of 10/30/23 (from the past 24 hour(s))   ECG 12 lead   Result Value Ref Range    Ventricular Rate 76 BPM    Atrial Rate 76 BPM    IL Interval 150 ms    QRS Duration 88 ms    QT Interval 386 ms    QTC Calculation(Bazett) 434 ms    P Axis 82 degrees    R Axis -10 degrees    T Axis 82 degrees    QRS Count 13 beats    Q Onset 225 ms    P Onset 150 ms    P Offset 197 ms    T Offset 418 ms    QTC Fredericia 417 ms   Protime-INR   Result Value Ref Range    Protime 23.9 (H) 9.3 - 12.7 seconds    INR 2.4 (H) 0.9 - 1.2   Basic Metabolic Panel   Result Value Ref Range    Glucose 90 65 - 99 mg/dL    Sodium 141 133 - 145 mmol/L    Potassium 3.9 3.4 - 5.1 mmol/L    Chloride 99 97 - 107 mmol/L    Bicarbonate 31 24 - 31 mmol/L    Urea Nitrogen 34 (H) " 8 - 25 mg/dL    Creatinine 1.00 0.40 - 1.60 mg/dL    eGFR 81 >60 mL/min/1.73m*2    Calcium 8.3 (L) 8.5 - 10.4 mg/dL    Anion Gap 11 <=19 mmol/L   CBC   Result Value Ref Range    WBC 11.1 4.4 - 11.3 x10*3/uL    nRBC 0.0 0.0 - 0.0 /100 WBCs    RBC 4.84 4.50 - 5.90 x10*6/uL    Hemoglobin 14.6 13.5 - 17.5 g/dL    Hematocrit 45.2 41.0 - 52.0 %    MCV 93 80 - 100 fL    MCH 30.2 26.0 - 34.0 pg    MCHC 32.3 32.0 - 36.0 g/dL    RDW 14.4 11.5 - 14.5 %    Platelets 230 150 - 450 x10*3/uL         Assessment/Plan   Principal Problem:    Atrial fibrillation with rapid ventricular response (CMS/HCC)  Active Problems:    COPD exacerbation (CMS/HCC)    Viral URI    10/31 Present to the emergency department yesterday with shortness of breath x1 week.  Patient does have a history of COPD and states that he wears oxygen as needed at home.  He denies any chest pain, pressure, nausea or vomiting.  On arrival to the emergency department labs were drawn troponins were normal at 14 and 13.  An EKG was completed which showed a normal sinus rhythm with no acute ST or T wave abnormalities.  Chest x-ray was completed which showed minimal bibasilar opacities.  The patient was given 3 DuoNeb treatments in the emergency department as well as a dose of IV Solu-Medrol which improved his breathing however the patient then flipped into an atrial fibrillation with rapid ventricular response.  He does have a history of paroxysmal atrial fibrillation for which he takes warfarin for stroke risk reduction.  It does appear that he has been compliant with his home warfarin as his INR is 2.3.  Warfarin to be and managed by admitting.  Patient states that he ran out of his home Cardizem and digoxin proximately 1 month ago so he has not been compliant with these medications.  Patient was given IV digoxin in the emergency department and started on a Cardizem drip.  Early this morning he converted to a normal sinus rhythm on telemetry at which time a Cardizem  drip was stopped.  Patient's blood pressures were elevated overnight however have improved this morning with his last at 111/66.  He is in a normal sinus rhythm on telemetry with rates in the 80s.  Patient appears overall euvolemic on examination.  Oxygen saturation is 93% on 2 L nasal cannula.  At this time patient appears overall stable from a cardiovascular standpoint.  The A-fib with RVR was likely due to breathing treatments received in the emergency department in combination with the patient not taking his daily medications.  I have added on a digoxin level to his labs this morning but I would continue his home dose of oral digoxin and Cardizem at this time.     11/1: Patient with improved rate control overnight.  He remains free from chest pain, pressure or palpitations.  He remains in sinus rhythm on telemetry with rates predominantly in the 70s and 80s.  Pressures have improved with his last recorded blood pressure 140/60.  He is overall euvolemic on examination.  Patient is breathing comfortably on nasal cannula oxygen with pulse oximetry of 95%.  Lab results this morning show sodium of 143, potassium 3.5, creatinine of 1.0, hemoglobin of 14.9.  We will supplement his potassium this morning with 40 mill equivalents of oral potassium.  Overall the patient continues to improve from a cardiac standpoint.  We will follow with you.    11/2: No significant changes over the past 24 hours. Patient denies chest pain, pressure or palpitations. Labs this morning are overall normal with a sodium of 141, potassium 3.9, creatinine of 1.0 and hemoglobin of 14.6.  His INR this morning is within range at 2.4.  His blood pressures have remained normotensive with his last at 137/70.  His oxygen saturation is 96% on 2 L nasal cannula and he appears to be breathing comfortably.  He is in atrial fibrillation on telemetry which is predominately rate controlled. Patient appears overall euvolemic on examination.  This patient  remains clinically stable from a cardiac standpoint and would be appropriate for discharge planning. Follow up with Dr. Abad in office in 2-3 weeks.          SHAYY Torre-CNP

## 2023-11-02 NOTE — CARE PLAN
Problem: Respiratory  Goal: Clear secretions with interventions this shift  Outcome: Progressing  Goal: Minimize anxiety/maximize coping throughout shift  Outcome: Progressing  Goal: Minimal/no exertional discomfort or dyspnea this shift  Outcome: Progressing  Goal: No signs of respiratory distress (eg. Use of accessory muscles. Peds grunting)  Outcome: Progressing  Goal: Patent airway maintained this shift  Outcome: Progressing  Goal: Verbalize decreased shortness of breath this shift  Outcome: Progressing  Goal: Wean oxygen to maintain O2 saturation per order/standard this shift  Outcome: Progressing

## 2023-11-03 VITALS
SYSTOLIC BLOOD PRESSURE: 148 MMHG | HEIGHT: 67 IN | HEART RATE: 70 BPM | BODY MASS INDEX: 27.99 KG/M2 | WEIGHT: 178.35 LBS | DIASTOLIC BLOOD PRESSURE: 78 MMHG | TEMPERATURE: 97.5 F | OXYGEN SATURATION: 93 % | RESPIRATION RATE: 18 BRPM

## 2023-11-03 LAB
ANION GAP SERPL CALC-SCNC: 9 MMOL/L
BUN SERPL-MCNC: 32 MG/DL (ref 8–25)
CALCIUM SERPL-MCNC: 8.1 MG/DL (ref 8.5–10.4)
CHLORIDE SERPL-SCNC: 102 MMOL/L (ref 97–107)
CO2 SERPL-SCNC: 31 MMOL/L (ref 24–31)
CREAT SERPL-MCNC: 1 MG/DL (ref 0.4–1.6)
ERYTHROCYTE [DISTWIDTH] IN BLOOD BY AUTOMATED COUNT: 14.4 % (ref 11.5–14.5)
GFR SERPL CREATININE-BSD FRML MDRD: 81 ML/MIN/1.73M*2
GLUCOSE SERPL-MCNC: 97 MG/DL (ref 65–99)
HCT VFR BLD AUTO: 43.4 % (ref 41–52)
HGB BLD-MCNC: 14.4 G/DL (ref 13.5–17.5)
INR PPP: 3.1 (ref 0.9–1.2)
MCH RBC QN AUTO: 29.9 PG (ref 26–34)
MCHC RBC AUTO-ENTMCNC: 33.2 G/DL (ref 32–36)
MCV RBC AUTO: 90 FL (ref 80–100)
NRBC BLD-RTO: 0 /100 WBCS (ref 0–0)
PLATELET # BLD AUTO: 218 X10*3/UL (ref 150–450)
POTASSIUM SERPL-SCNC: 3.6 MMOL/L (ref 3.4–5.1)
PROTHROMBIN TIME: 30.8 SECONDS (ref 9.3–12.7)
RBC # BLD AUTO: 4.82 X10*6/UL (ref 4.5–5.9)
SODIUM SERPL-SCNC: 142 MMOL/L (ref 133–145)
WBC # BLD AUTO: 10.7 X10*3/UL (ref 4.4–11.3)

## 2023-11-03 PROCEDURE — 2500000005 HC RX 250 GENERAL PHARMACY W/O HCPCS: Performed by: HOSPITALIST

## 2023-11-03 PROCEDURE — 2500000001 HC RX 250 WO HCPCS SELF ADMINISTERED DRUGS (ALT 637 FOR MEDICARE OP)

## 2023-11-03 PROCEDURE — 2500000002 HC RX 250 W HCPCS SELF ADMINISTERED DRUGS (ALT 637 FOR MEDICARE OP, ALT 636 FOR OP/ED): Performed by: HOSPITALIST

## 2023-11-03 PROCEDURE — 2500000004 HC RX 250 GENERAL PHARMACY W/ HCPCS (ALT 636 FOR OP/ED): Performed by: HOSPITALIST

## 2023-11-03 PROCEDURE — 96366 THER/PROPH/DIAG IV INF ADDON: CPT | Performed by: STUDENT IN AN ORGANIZED HEALTH CARE EDUCATION/TRAINING PROGRAM

## 2023-11-03 PROCEDURE — 85610 PROTHROMBIN TIME: CPT | Performed by: HOSPITALIST

## 2023-11-03 PROCEDURE — 36415 COLL VENOUS BLD VENIPUNCTURE: CPT | Performed by: HOSPITALIST

## 2023-11-03 PROCEDURE — 94640 AIRWAY INHALATION TREATMENT: CPT

## 2023-11-03 PROCEDURE — 96365 THER/PROPH/DIAG IV INF INIT: CPT | Performed by: STUDENT IN AN ORGANIZED HEALTH CARE EDUCATION/TRAINING PROGRAM

## 2023-11-03 PROCEDURE — 9420000001 HC RT PATIENT EDUCATION 5 MIN

## 2023-11-03 PROCEDURE — 85027 COMPLETE CBC AUTOMATED: CPT | Performed by: HOSPITALIST

## 2023-11-03 PROCEDURE — 80048 BASIC METABOLIC PNL TOTAL CA: CPT | Performed by: HOSPITALIST

## 2023-11-03 PROCEDURE — 96375 TX/PRO/DX INJ NEW DRUG ADDON: CPT | Performed by: STUDENT IN AN ORGANIZED HEALTH CARE EDUCATION/TRAINING PROGRAM

## 2023-11-03 PROCEDURE — 2500000001 HC RX 250 WO HCPCS SELF ADMINISTERED DRUGS (ALT 637 FOR MEDICARE OP): Performed by: HOSPITALIST

## 2023-11-03 PROCEDURE — 96361 HYDRATE IV INFUSION ADD-ON: CPT | Performed by: STUDENT IN AN ORGANIZED HEALTH CARE EDUCATION/TRAINING PROGRAM

## 2023-11-03 PROCEDURE — G0378 HOSPITAL OBSERVATION PER HR: HCPCS

## 2023-11-03 RX ORDER — WARFARIN 2 MG/1
TABLET ORAL
Refills: 0
Start: 2023-11-05 | End: 2024-04-01 | Stop reason: SDUPTHER

## 2023-11-03 RX ORDER — DIGOXIN 125 MCG
125 TABLET ORAL DAILY
Qty: 30 TABLET | Refills: 1 | Status: SHIPPED | OUTPATIENT
Start: 2023-11-03 | End: 2024-01-15 | Stop reason: SDUPTHER

## 2023-11-03 RX ORDER — PREDNISONE 5 MG/1
5 TABLET ORAL DAILY
Qty: 3 TABLET | Refills: 0 | Status: SHIPPED | OUTPATIENT
Start: 2023-11-09 | End: 2023-11-12

## 2023-11-03 RX ORDER — BUDESONIDE 0.5 MG/2ML
0.5 INHALANT ORAL
Refills: 0
Start: 2023-11-03

## 2023-11-03 RX ORDER — ATORVASTATIN CALCIUM 10 MG/1
10 TABLET, FILM COATED ORAL NIGHTLY
Refills: 0
Start: 2023-11-03 | End: 2024-01-29 | Stop reason: SDUPTHER

## 2023-11-03 RX ORDER — PREDNISONE 10 MG/1
10 TABLET ORAL DAILY
Qty: 3 TABLET | Refills: 0 | Status: SHIPPED | OUTPATIENT
Start: 2023-11-06 | End: 2023-11-09

## 2023-11-03 RX ORDER — FUROSEMIDE 40 MG/1
40 TABLET ORAL DAILY
Qty: 30 TABLET | Refills: 1 | Status: SHIPPED | OUTPATIENT
Start: 2023-11-04 | End: 2024-01-15 | Stop reason: SDUPTHER

## 2023-11-03 RX ORDER — DILTIAZEM HYDROCHLORIDE 180 MG/1
180 CAPSULE, COATED, EXTENDED RELEASE ORAL DAILY
Qty: 30 CAPSULE | Refills: 1 | Status: SHIPPED | OUTPATIENT
Start: 2023-11-03 | End: 2024-01-15 | Stop reason: SDUPTHER

## 2023-11-03 RX ORDER — PREDNISONE 5 MG/1
15 TABLET ORAL DAILY
Qty: 6 TABLET | Refills: 0 | Status: SHIPPED | OUTPATIENT
Start: 2023-11-04 | End: 2023-11-06

## 2023-11-03 RX ORDER — MONTELUKAST SODIUM 10 MG/1
5 TABLET ORAL NIGHTLY
Refills: 0
Start: 2023-11-03 | End: 2024-03-23 | Stop reason: HOSPADM

## 2023-11-03 RX ADMIN — Medication 2 L/MIN: at 00:00

## 2023-11-03 RX ADMIN — PREDNISONE 15 MG: 5 TABLET ORAL at 09:08

## 2023-11-03 RX ADMIN — DIGOXIN 125 MCG: 125 TABLET ORAL at 09:09

## 2023-11-03 RX ADMIN — Medication 2 L/MIN: at 08:00

## 2023-11-03 RX ADMIN — TIOTROPIUM BROMIDE INHALATION SPRAY 2 PUFF: 3.12 SPRAY, METERED RESPIRATORY (INHALATION) at 08:03

## 2023-11-03 RX ADMIN — DILTIAZEM HYDROCHLORIDE 180 MG: 180 CAPSULE, EXTENDED RELEASE ORAL at 09:08

## 2023-11-03 RX ADMIN — FUROSEMIDE 40 MG: 40 TABLET ORAL at 09:08

## 2023-11-03 RX ADMIN — DOCUSATE SODIUM 100 MG: 100 CAPSULE, LIQUID FILLED ORAL at 09:09

## 2023-11-03 RX ADMIN — BUDESONIDE INHALATION 0.5 MG: 0.5 SUSPENSION RESPIRATORY (INHALATION) at 08:04

## 2023-11-03 ASSESSMENT — PAIN - FUNCTIONAL ASSESSMENT
PAIN_FUNCTIONAL_ASSESSMENT: 0-10

## 2023-11-03 ASSESSMENT — PAIN SCALES - GENERAL
PAINLEVEL_OUTOF10: 0 - NO PAIN

## 2023-11-03 NOTE — DISCHARGE SUMMARY
Discharge Diagnosis  Atrial fibrillation with rapid ventricular response (CMS/HCC)    Issues Requiring Follow-Up  Follow up with cardiology    Test Results Pending At Discharge  Pending Labs       No current pending labs.            Hospital Course   Reggie Munoz is a 72 y.o. male presented with shortness of breath.  Patient reported wheezing and shortness of breath for approximately a week.  Stated that he had increased coughing but no sputum production.  He stated that early morning his breathing became worse and he decided to come to the ED.  He does have oxygen at home but he only uses it as needed.  Stated he was wearing it prior to coming into the emergency room.  In the emergency room patient was given 3 DuoNeb treatments and a dose of IV Solu-Medrol.  His breathing improved significantly however patient's heart rate went into atrial fibrillation with RVR.  Denied any palpitations or chest pain.  Did report an unusual sensation in his chest the morning he came in.  Patient was seen by his primary care doctor earlier this month.  At that time he told her that he was not taking his medications.  She prescribed him losartan hydrochlorothiazide combination pill.  He subsequently saw Dr. Abad last week.  Unclear if he was taking his medications at that time. He was admitted to [facility] for further evaluation and treatment.    Patient was treated for atrial fibrillation with RVR.  Cardiology was consulted.  Patient was given IV digoxin in the ED and initially on a Cardizem drip.  He is on Coumadin.  He was treated for COPD exacerbation.  Treated with steroids with improvement.  Initially was requiring continuous oxygen however has been weaned to room air.  He does have chronic respiratory failure and wears oxygen at home as needed.  He was treated with budesonide and tiotropium.  As needed albuterol.  Singulair was continued.  He did receive a one-time dose of IV Lasix.  He did have lower extremity  edema.  Patient had been on digoxin and Cardizem however had run out a month prior.  Heart rate did not improve.  He converted to normal sinus rhythm.  Patient was positioned to oral Cardizem and digoxin.  This will be continued on discharge.  Cleared by cardiology for discharge.  Patient will need to follow-up with Dr. Abad in 2 to 3 weeks.  Was seen by PT/OT, no needs.  Patient stable for discharge home today.    Pertinent Physical Exam At Time of Discharge  Physical Exam  Vitals reviewed.   Constitutional:       Appearance: Normal appearance.   HENT:      Head: Normocephalic and atraumatic.   Eyes:      Extraocular Movements: Extraocular movements intact.      Conjunctiva/sclera: Conjunctivae normal.   Cardiovascular:      Rate and Rhythm: Normal rate and regular rhythm.   Pulmonary:      Effort: Pulmonary effort is normal.      Breath sounds: Normal breath sounds. No wheezing, rhonchi or rales.   Abdominal:      General: Bowel sounds are normal.      Palpations: Abdomen is soft.      Tenderness: There is no abdominal tenderness.   Musculoskeletal:         General: Normal range of motion.   Skin:     General: Skin is warm and dry.   Neurological:      General: No focal deficit present.      Mental Status: He is alert and oriented to person, place, and time.         Home Medications     Medication List      START taking these medications     digoxin 125 MCG tablet; Commonly known as: Lanoxin; TAKE 1 TABLET BY   MOUTH DAILY   dilTIAZem  mg 24 hr capsule; Commonly known as: Cardizem CD; TAKE   1 CAPSULE BY MOUTH DAILY   furosemide 40 mg tablet; Commonly known as: Lasix; Take 1 tablet (40 mg)   by mouth once daily. Do not start before November 4, 2023.; Start taking   on: November 4, 2023   oxygen gas therapy; Commonly known as: O2; Inhale 1 each every 8 hours.   * predniSONE 5 mg tablet; Commonly known as: Deltasone; Take 3 tablets   (15 mg) by mouth once daily for 2 doses. Do not start before November 4,    2023.; Start taking on: November 4, 2023   * predniSONE 10 mg tablet; Commonly known as: Deltasone; Take 1 tablet   (10 mg) by mouth once daily for 3 doses. Do not start before November 6, 2023.; Start taking on: November 6, 2023   * predniSONE 5 mg tablet; Commonly known as: Deltasone; Take 1 tablet (5   mg) by mouth once daily for 3 doses. Do not start before November 9, 2023.; Start taking on: November 9, 2023   tiotropium 2.5 mcg/actuation inhaler; Commonly known as: Spiriva   Respimat; Inhale 2 puffs once daily. Do not start before November 4, 2023.; Start taking on: November 4, 2023; Replaces: Spiriva with   HandiHaler 18 mcg inhalation capsule  * This list has 3 medication(s) that are the same as other medications   prescribed for you. Read the directions carefully, and ask your doctor or   other care provider to review them with you.     CHANGE how you take these medications     atorvastatin 10 mg tablet; Commonly known as: Lipitor; Take 1 tablet (10   mg) by mouth once daily at bedtime.; What changed: See the new   instructions.   budesonide 0.5 mg/2 mL nebulizer solution; Commonly known as: Pulmicort;   Take 2 mL (0.5 mg) by nebulization 2 times a day. Rinse mouth with water   after use to reduce aftertaste and incidence of candidiasis. Do not   swallow.; What changed: See the new instructions.   montelukast 10 mg tablet; Commonly known as: Singulair; Take 0.5 tablets   (5 mg) by mouth once daily at bedtime.; What changed: See the new   instructions.   warfarin 2 mg tablet; Commonly known as: Coumadin; Take as directed. If   you are unsure how to take this medication, talk to your nurse or doctor.;   Original instructions: Take as directed per After Visit Summary. Do not   start before November 5, 2023.; Start taking on: November 5, 2023; What   changed: See the new instructions., These instructions start on November 5, 2023. If you are unsure what to do until then, ask your doctor or other    care provider.     CONTINUE taking these medications     Ventolin HFA 90 mcg/actuation inhaler; Generic drug: albuterol     STOP taking these medications     alendronate 70 mg tablet; Commonly known as: Fosamax   calcium citrate-vitamin D3 315 mg-5 mcg (200 unit) tablet; Commonly   known as: Citracal+D   fluticasone 50 mcg/actuation nasal spray; Commonly known as: Flonase   Icy Hot Patch (lido-menthol) 4-1 % adhesive patch,medicated; Generic   drug: lidocaine-menthol   ipratropium-albuteroL 0.5-2.5 mg/3 mL nebulizer solution; Commonly known   as: Duo-Neb   ketorolac 0.5 % ophthalmic solution; Commonly known as: Acular   losartan-hydrochlorothiazide 50-12.5 mg tablet; Commonly known as:   Hyzaar   Spiriva with HandiHaler 18 mcg inhalation capsule; Generic drug:   tiotropium; Replaced by: tiotropium 2.5 mcg/actuation inhaler   traMADol 50 mg tablet; Commonly known as: Ultram       Outpatient Follow-Up  Future Appointments   Date Time Provider Department [place]   11/8/2023  2:30 PM YURY ACOAG NURSE JHON [facility]   3/6/2024  3:00 PM REFRACTION YURY [id number] [facility]   3/6/2024  3:15 PM Ranjit Puri MD [id number] [facility]   4/11/2024  1:45 PM Suresh Abad DO [id number] [facility]     Time spent on discharge: 35 minutes    SHAYY Fonseca-CNP

## 2023-11-03 NOTE — CARE PLAN
Problem: Fall/Injury  Goal: Not fall by end of shift  Outcome: Progressing  Goal: Be free from injury by end of the shift  Outcome: Progressing  Goal: Verbalize understanding of personal risk factors for fall in the hospital  Outcome: Progressing  Goal: Verbalize understanding of risk factor reduction measures to prevent injury from fall in the home  Outcome: Progressing  Goal: Use assistive devices by end of the shift  Outcome: Progressing  Goal: Pace activities to prevent fatigue by end of the shift  Outcome: Progressing     Problem: Respiratory  Goal: Clear secretions with interventions this shift  Outcome: Progressing  Goal: Minimize anxiety/maximize coping throughout shift  Outcome: Progressing  Goal: Minimal/no exertional discomfort or dyspnea this shift  Outcome: Progressing  Goal: No signs of respiratory distress (eg. Use of accessory muscles. Peds grunting)  Outcome: Progressing  Goal: Patent airway maintained this shift  Outcome: Progressing  Goal: Verbalize decreased shortness of breath this shift  Outcome: Progressing  Goal: Wean oxygen to maintain O2 saturation per order/standard this shift  Outcome: Progressing

## 2023-11-03 NOTE — NURSING NOTE
Report received. Patient resting in bed. Denies needs. Call light within reach. No distress noted.

## 2023-11-03 NOTE — PROGRESS NOTES
Medication Education     Medication education for Reggie Munoz was provided to the patient  for the following medication(s):  Diltiazem  furosemide      Medication education provided by a Pharmacist:  ADR Counseling How to take and what to do if a dose is missed How the medication works and benefits of taking it Benefits of taking the medication  Importance of compliance    Identified potential barriers to education:  None    Method(s) of Education:  Verbal Written materials provided and reviewed    An opportunity to ask questions and receive answers was provided.     Assessment of understanding the patient :  2= meets goals/outcomes    Additional Notes (if applicable): pill box provided    Ariana Doe, JermaineD

## 2023-11-03 NOTE — NURSING NOTE
Discharge instructions reviewed with patient. All questions answered. Attempted to call patient Friend Raya to  patient. No answer. Patient to attempt as well. Patient states he does not have another ride. States he will wait for her.

## 2023-11-03 NOTE — PROGRESS NOTES
Pt is anticipated to dc home today, will return home without further skilled services needed.

## 2023-11-03 NOTE — NURSING NOTE
Patient discharged home via wheelchair. All belongings accounted for. VSS at time of discharge. Discharge paperwork in hand.

## 2023-11-03 NOTE — PROGRESS NOTES
Home O2 evaluation completed. Pt SpO2 was 95% resting on room air. While ambulating on room air, his SpO2 maintained at 93% or greater. He also stated he wears O2 at home PRN and receives it from [facility].

## 2023-11-08 ENCOUNTER — ANTICOAGULATION - WARFARIN VISIT (OUTPATIENT)
Dept: CARDIOLOGY | Facility: HOSPITAL | Age: 73
End: 2023-11-08
Payer: MEDICARE

## 2023-11-08 DIAGNOSIS — I48.0 PAROXYSMAL ATRIAL FIBRILLATION (MULTI): Primary | ICD-10-CM

## 2023-11-08 LAB
POC INR: 1.5
POC PROTHROMBIN TIME: NORMAL

## 2023-11-08 PROCEDURE — 99211 OFF/OP EST MAY X REQ PHY/QHP: CPT | Performed by: INTERNAL MEDICINE

## 2023-11-08 NOTE — PROGRESS NOTES
Patient identification verified with 2 identifiers.    Location: [facility] - 1st Floor Anticoagulation Clinic [address] [id number]    Referring Physician: Dr. Abad  Enrollment/ Re-enrollment date: 10/18/24   INR Goal: 2.0-3.0  INR monitoring is per [facility] protocol.  Anticoagulation Medication: warfarin  Indication: Atrial Fibrillation/Atrial Flutter    Subjective   Bleeding signs/symptoms: No    Bruising: No   Major bleeding event: No  Thrombosis signs/symptoms: No  Thromboembolic event: No  Missed doses: Yes  may not have had all his doses while in hospital  Extra doses: No  Medication changes: Yes  resuemd cardizem and digoxin/ also had a prednisone taper  Dietary changes: No  Change in health: Yes  was admitted with rapid AF and COPD exacerbation  Change in activity: No  Alcohol: No  Other concerns: No    Upcoming Surgeries:  Does the Patient Have any upcoming surgeries that require interruption in anticoagulation therapy? no  Does the patient require bridging? no      Anticoagulation Summary  As of 2023      INR goal:  2.0-3.0   TTR:  31.3 % (1.1 mo)   INR used for dosin.50 (2023)   Weekly warfarin total:  14 mg               Assessment/Plan   Subtherapeutic     1. New dose:  one time dose increase then maintain dose     2. Next INR: 1 week      Education provided to patient during the visit:  Patient instructed to call in interim with questions, concerns and changes.   Patient educated on compliance with dosing, follow up appointments, and prescribed plan of care.

## 2023-11-15 ENCOUNTER — ANTICOAGULATION - WARFARIN VISIT (OUTPATIENT)
Dept: CARDIOLOGY | Facility: HOSPITAL | Age: 73
End: 2023-11-15
Payer: MEDICARE

## 2023-11-15 DIAGNOSIS — I48.0 PAROXYSMAL ATRIAL FIBRILLATION (MULTI): Primary | ICD-10-CM

## 2023-11-15 LAB
POC INR: 2.2
POC PROTHROMBIN TIME: NORMAL

## 2023-11-15 PROCEDURE — 99211 OFF/OP EST MAY X REQ PHY/QHP: CPT | Performed by: INTERNAL MEDICINE

## 2023-11-15 NOTE — PROGRESS NOTES
Patient identification verified with 2 identifiers.    Location: [facility] - 1st Floor Anticoagulation Clinic [address] [id number]    Referring Physician: Dr. Abad  Enrollment/ Re-enrollment date:    INR Goal: 2.0-3.0  INR monitoring is per [facility] protocol.  Anticoagulation Medication: warfarin  Indication: Atrial Fibrillation/Atrial Flutter    Subjective   Bleeding signs/symptoms: No    Bruising: No   Major bleeding event: No  Thrombosis signs/symptoms: No  Thromboembolic event: No  Missed doses: No  Extra doses: No  Medication changes: No  Dietary changes: No  Change in health: No  Change in activity: No  Alcohol: No  Other concerns: No    Upcoming Surgeries:  Does the Patient Have any upcoming surgeries that require interruption in anticoagulation therapy? no  Does the patient require bridging? no      Anticoagulation Summary  As of 11/15/2023      INR goal:  2.0-3.0   TTR:  30.9 % (1.4 mo)   INR used for dosin.20 (11/15/2023)   Weekly warfarin total:  14 mg               Assessment/Plan   Therapeutic     1. New dose: no change    2. Next INR: 1 month      Education provided to patient during the visit:  Patient instructed to call in interim with questions, concerns and changes.

## 2023-11-20 ENCOUNTER — HOSPITAL ENCOUNTER (EMERGENCY)
Facility: HOSPITAL | Age: 73
Discharge: HOME | End: 2023-11-20
Attending: EMERGENCY MEDICINE
Payer: MEDICARE

## 2023-11-20 ENCOUNTER — APPOINTMENT (OUTPATIENT)
Dept: RADIOLOGY | Facility: HOSPITAL | Age: 73
End: 2023-11-20
Payer: MEDICARE

## 2023-11-20 VITALS
BODY MASS INDEX: 28.56 KG/M2 | SYSTOLIC BLOOD PRESSURE: 147 MMHG | OXYGEN SATURATION: 92 % | DIASTOLIC BLOOD PRESSURE: 87 MMHG | WEIGHT: 182 LBS | RESPIRATION RATE: 20 BRPM | HEIGHT: 67 IN | TEMPERATURE: 98.2 F | HEART RATE: 78 BPM

## 2023-11-20 DIAGNOSIS — J44.1 ACUTE EXACERBATION OF CHRONIC OBSTRUCTIVE PULMONARY DISEASE (COPD) (MULTI): Primary | ICD-10-CM

## 2023-11-20 LAB
ANION GAP SERPL CALC-SCNC: 9 MMOL/L
BASOPHILS # BLD AUTO: 0.04 X10*3/UL (ref 0–0.1)
BASOPHILS NFR BLD AUTO: 0.6 %
BUN SERPL-MCNC: 21 MG/DL (ref 8–25)
CALCIUM SERPL-MCNC: 9.4 MG/DL (ref 8.5–10.4)
CHLORIDE SERPL-SCNC: 98 MMOL/L (ref 97–107)
CO2 SERPL-SCNC: 33 MMOL/L (ref 24–31)
CREAT SERPL-MCNC: 0.8 MG/DL (ref 0.4–1.6)
EOSINOPHIL # BLD AUTO: 0.22 X10*3/UL (ref 0–0.4)
EOSINOPHIL NFR BLD AUTO: 3 %
ERYTHROCYTE [DISTWIDTH] IN BLOOD BY AUTOMATED COUNT: 14.3 % (ref 11.5–14.5)
GFR SERPL CREATININE-BSD FRML MDRD: >90 ML/MIN/1.73M*2
GLUCOSE SERPL-MCNC: 122 MG/DL (ref 65–99)
HCT VFR BLD AUTO: 49 % (ref 41–52)
HGB BLD-MCNC: 15.7 G/DL (ref 13.5–17.5)
IMM GRANULOCYTES # BLD AUTO: 0.03 X10*3/UL (ref 0–0.5)
IMM GRANULOCYTES NFR BLD AUTO: 0.4 % (ref 0–0.9)
LYMPHOCYTES # BLD AUTO: 0.55 X10*3/UL (ref 0.8–3)
LYMPHOCYTES NFR BLD AUTO: 7.6 %
MCH RBC QN AUTO: 30 PG (ref 26–34)
MCHC RBC AUTO-ENTMCNC: 32 G/DL (ref 32–36)
MCV RBC AUTO: 94 FL (ref 80–100)
MONOCYTES # BLD AUTO: 0.27 X10*3/UL (ref 0.05–0.8)
MONOCYTES NFR BLD AUTO: 3.7 %
NEUTROPHILS # BLD AUTO: 6.14 X10*3/UL (ref 1.6–5.5)
NEUTROPHILS NFR BLD AUTO: 84.7 %
NRBC BLD-RTO: 0 /100 WBCS (ref 0–0)
PLATELET # BLD AUTO: 182 X10*3/UL (ref 150–450)
POTASSIUM SERPL-SCNC: 4.3 MMOL/L (ref 3.4–5.1)
RBC # BLD AUTO: 5.24 X10*6/UL (ref 4.5–5.9)
SODIUM SERPL-SCNC: 140 MMOL/L (ref 133–145)
WBC # BLD AUTO: 7.3 X10*3/UL (ref 4.4–11.3)

## 2023-11-20 PROCEDURE — 99284 EMERGENCY DEPT VISIT MOD MDM: CPT | Mod: 25

## 2023-11-20 PROCEDURE — 85025 COMPLETE CBC W/AUTO DIFF WBC: CPT | Performed by: EMERGENCY MEDICINE

## 2023-11-20 PROCEDURE — 36415 COLL VENOUS BLD VENIPUNCTURE: CPT | Performed by: EMERGENCY MEDICINE

## 2023-11-20 PROCEDURE — 2500000004 HC RX 250 GENERAL PHARMACY W/ HCPCS (ALT 636 FOR OP/ED): Performed by: EMERGENCY MEDICINE

## 2023-11-20 PROCEDURE — 99285 EMERGENCY DEPT VISIT HI MDM: CPT | Mod: 25 | Performed by: EMERGENCY MEDICINE

## 2023-11-20 PROCEDURE — 2500000002 HC RX 250 W HCPCS SELF ADMINISTERED DRUGS (ALT 637 FOR MEDICARE OP, ALT 636 FOR OP/ED): Performed by: EMERGENCY MEDICINE

## 2023-11-20 PROCEDURE — 71046 X-RAY EXAM CHEST 2 VIEWS: CPT | Mod: FY

## 2023-11-20 PROCEDURE — 94640 AIRWAY INHALATION TREATMENT: CPT

## 2023-11-20 PROCEDURE — 96374 THER/PROPH/DIAG INJ IV PUSH: CPT

## 2023-11-20 PROCEDURE — 80048 BASIC METABOLIC PNL TOTAL CA: CPT | Performed by: EMERGENCY MEDICINE

## 2023-11-20 RX ORDER — IPRATROPIUM BROMIDE AND ALBUTEROL SULFATE 2.5; .5 MG/3ML; MG/3ML
3 SOLUTION RESPIRATORY (INHALATION) ONCE
Status: COMPLETED | OUTPATIENT
Start: 2023-11-20 | End: 2023-11-20

## 2023-11-20 RX ORDER — PEN NEEDLE, DIABETIC 31 GX5/16"
1 NEEDLE, DISPOSABLE MISCELLANEOUS EVERY 6 HOURS PRN
Qty: 1 EACH | Refills: 0 | Status: SHIPPED | OUTPATIENT
Start: 2023-11-20

## 2023-11-20 RX ORDER — PREDNISONE 20 MG/1
40 TABLET ORAL DAILY
Qty: 10 TABLET | Refills: 0 | Status: SHIPPED | OUTPATIENT
Start: 2023-11-20 | End: 2023-11-25

## 2023-11-20 RX ORDER — ALBUTEROL SULFATE 90 UG/1
2 AEROSOL, METERED RESPIRATORY (INHALATION) EVERY 6 HOURS PRN
Qty: 8 G | Refills: 0 | Status: SHIPPED | OUTPATIENT
Start: 2023-11-20

## 2023-11-20 RX ADMIN — METHYLPREDNISOLONE SODIUM SUCCINATE 125 MG: 125 INJECTION, POWDER, FOR SOLUTION INTRAMUSCULAR; INTRAVENOUS at 11:14

## 2023-11-20 RX ADMIN — IPRATROPIUM BROMIDE AND ALBUTEROL SULFATE 3 ML: .5; 3 SOLUTION RESPIRATORY (INHALATION) at 11:14

## 2023-11-20 ASSESSMENT — LIFESTYLE VARIABLES
HAVE YOU EVER FELT YOU SHOULD CUT DOWN ON YOUR DRINKING: NO
EVER HAD A DRINK FIRST THING IN THE MORNING TO STEADY YOUR NERVES TO GET RID OF A HANGOVER: NO
EVER FELT BAD OR GUILTY ABOUT YOUR DRINKING: NO
REASON UNABLE TO ASSESS: NO
HAVE PEOPLE ANNOYED YOU BY CRITICIZING YOUR DRINKING: NO

## 2023-11-20 ASSESSMENT — COLUMBIA-SUICIDE SEVERITY RATING SCALE - C-SSRS
1. IN THE PAST MONTH, HAVE YOU WISHED YOU WERE DEAD OR WISHED YOU COULD GO TO SLEEP AND NOT WAKE UP?: NO
6. HAVE YOU EVER DONE ANYTHING, STARTED TO DO ANYTHING, OR PREPARED TO DO ANYTHING TO END YOUR LIFE?: NO
2. HAVE YOU ACTUALLY HAD ANY THOUGHTS OF KILLING YOURSELF?: NO

## 2023-11-20 ASSESSMENT — PAIN SCALES - GENERAL: PAINLEVEL_OUTOF10: 0 - NO PAIN

## 2023-11-20 ASSESSMENT — PAIN - FUNCTIONAL ASSESSMENT: PAIN_FUNCTIONAL_ASSESSMENT: 0-10

## 2023-11-20 NOTE — ED PROVIDER NOTES
HPI   Chief Complaint   Patient presents with    Wheezing       73-year-old male with history of COPD on 2 L as needed oxygen at home presents for evaluation of increased wheezing since last night.  States he has not had any significant cough just noticed increased wheezing.  States that his nebulizer machine does not seem to be working right and he typically does not use any inhalers.  States he may have them at home but he does not really use them so is not certain.  No fever.  No chest pain.                          No data recorded                Patient History   Past Medical History:   Diagnosis Date    A-fib (CMS/[facility])     COPD (chronic obstructive pulmonary disease) (CMS/[facility])     Hyperlipidemia     Hypertension     Osteopenia     Personal history of other diseases of the circulatory system     History of angina    Personal history of other diseases of the circulatory system     History of hypertension    Personal history of other diseases of the respiratory system     History of chronic obstructive lung disease    Unspecified asthma with (acute) exacerbation     Asthma with acute exacerbation, unspecified asthma severity, unspecified whether persistent     Past Surgical History:   Procedure Laterality Date    IR ANGIOGRAM PULMONARY Bilateral 4/10/2012    IR ANGIOGRAM PULMONARY [facility]    OTHER SURGICAL HISTORY  07/23/2021    No history of surgery     Family History   Problem Relation Name Age of Onset    Stroke Mother      Lung cancer Father      Stroke Sister       Social History     Tobacco Use    Smoking status: Never    Smokeless tobacco: Never   Vaping Use    Vaping Use: Never used   Substance Use Topics    Alcohol use: Not Currently    Drug use: Never       Physical Exam   ED Triage Vitals [11/20/23 1035]   Temp Heart Rate Resp BP   36.8 °C (98.2 °F) 94 24 (!) 177/94      SpO2 Temp Source Heart Rate Source Patient Position   90 % Oral Monitor Sitting      BP Location FiO2 (%)     Right arm  --       Physical Exam  Vitals and nursing note reviewed.     General: Vitals reviewed. Awake, alert, well-developed, well-nourished, NAD  HEENT: NC/AT, PERRL, MMM  Neck: Supple, trachea midline  Respiratory: No respiratory distress, speaking full sentences, somewhat diminished air entry bilaterally with end expiratory wheezes, no focal deficits, no rhonchi or rales  CV: Regular rate and regular rhythm, no murmur/gallop/rubs  Abdomen/GI: Soft, non-tender, non-distended, no rebound, guarding, or rigidity, normal bowel sounds  Extremities: Moving all extremities, no deformities  Neuro: A/O, normal speech  Skin: Warm, dry. No rashes identified    ED Course & MDM   ED Course as of 11/20/23 1303   Mon Nov 20, 2023   1049 EKG on my independent review: Normal sinus rhythm at 73 bpm, normal axis, normal intervals, significant artifact with somewhat poor quality baseline but no clear acute ST or T wave abnormalities [YRN]   1255 Patient feeling significantly improved; wheezing much better, no respiratory distress, eating food without difficulty [YRN]      ED Course User Index  [YRN] Cira Mandujano MD         Diagnoses as of 11/20/23 1303   Acute exacerbation of chronic obstructive pulmonary disease (COPD) (CMS/[facility])       Medical Decision Making  73-year-old male presents for wheezing in the setting of baseline COPD and having his nebulizer machine not functioning appropriately at this time.  He does admit to taking prednisone at home and thought this was may be helping but clinically his presentation most suggestive of COPD exacerbation.  Also consider secondary pneumonia although he does not have any particular ill symptoms to suggest underlying infectious etiology.  Screening EKG obtained and evaluation of ACS which was nondiagnostic and patient is not having any chest pain feel this is much less likely.  Labs without significant abnormality.  Chest x-ray on my independent review with no acute cardiopulmonary process.   Patient given Solu-Medrol, DuoNebs x2 with significant improvement in wheezing resolved patient is feeling much better.  This time do feel he is appropriate for discharge home with outpatient management.  We will treat with course of prednisone and rewrite prescription for albuterol inhaler as needed as well as new nebulizer machine.  Return precautions discussed.    Amount and/or Complexity of Data Reviewed  Labs: ordered. Decision-making details documented in ED Course.  Radiology: ordered and independent interpretation performed. Decision-making details documented in ED Course.  ECG/medicine tests: ordered and independent interpretation performed. Decision-making details documented in ED Course.        Procedure  Procedures     Cira Mandujano MD  11/20/23 4860

## 2023-11-20 NOTE — DISCHARGE INSTRUCTIONS
Albuterol inhaler or nebulizer as needed for shortness of breath or wheezing.  Prednisone as prescribed until gone.

## 2023-11-30 ENCOUNTER — ANTICOAGULATION - WARFARIN VISIT (OUTPATIENT)
Dept: CARDIOLOGY | Facility: HOSPITAL | Age: 73
End: 2023-11-30
Payer: MEDICARE

## 2023-11-30 DIAGNOSIS — I48.0 PAROXYSMAL ATRIAL FIBRILLATION (MULTI): Primary | ICD-10-CM

## 2023-12-03 NOTE — ED PROVIDER NOTES
HPI   Chief Complaint   Patient presents with    Shortness of Breath       HPI  See [facility]                  No data recorded                Patient History   Past Medical History:   Diagnosis Date    A-fib (CMS/[facility])     COPD (chronic obstructive pulmonary disease) (CMS/[facility])     Hyperlipidemia     Hypertension     Osteopenia     Personal history of other diseases of the circulatory system     History of angina    Personal history of other diseases of the circulatory system     History of hypertension    Personal history of other diseases of the respiratory system     History of chronic obstructive lung disease    Unspecified asthma with (acute) exacerbation     Asthma with acute exacerbation, unspecified asthma severity, unspecified whether persistent     Past Surgical History:   Procedure Laterality Date    IR ANGIOGRAM PULMONARY Bilateral 4/10/2012    IR ANGIOGRAM PULMONARY [facility]    OTHER SURGICAL HISTORY  07/23/2021    No history of surgery     Family History   Problem Relation Name Age of Onset    Stroke Mother      Lung cancer Father      Stroke Sister       Social History     Tobacco Use    Smoking status: Never    Smokeless tobacco: Never   Vaping Use    Vaping Use: Never used   Substance Use Topics    Alcohol use: Not Currently    Drug use: Never       Physical Exam   ED Triage Vitals   Temp Heart Rate Resp BP   10/30/23 1545 10/30/23 1545 10/30/23 1545 10/30/23 1545   36.3 °C (97.3 °F) 80 20 167/90      SpO2 Temp Source Heart Rate Source Patient Position   10/30/23 1545 10/30/23 2221 10/30/23 1545 10/30/23 1545   95 % Temporal Monitor Sitting      BP Location FiO2 (%)     10/30/23 1545 10/30/23 2221     Left arm (!) 2 %       Physical Exam  See [facility]  ED Course & [facility]   ED Course as of 12/02/23 1937   Mon Oct 30, 2023   1705 EKG presented to me at 5:05 PM     EKG as interpreted by me shows sinus rhythm of 76 bpm, frequent PVCs, no ST changes to suggest ischemia, normal axis, normal intervals.   [DH]    1725 Chest x-ray showed redemonstration blunting of the left lateral costophrenic angle may be due to a trace pleural effusion.  Mild streaky bibasilar opacities noted no new focal consolidation recommended CT if clinically indicated. [DH]   1802 Patient resting in bed.  Received 3 breathing treatment.  Lungs are clear.  Patient is tachycardic at this time A-fib.  Receiving IV fluids.  Repeat troponin pending.  If heart rate improves patient is not tachypneic will be able to go home.  Impression COPD with viral illness with a fib  [TB]      ED Course User Index  [DH] Huy Rene MD  [TB] Lashanda Jim, APRN-CNP         Diagnoses as of 12/02/23 1937   COPD exacerbation (CMS/HCC)   Viral URI   Atrial fibrillation, unspecified type (CMS/HCC)   Atrial fibrillation with rapid ventricular response (CMS/HCC)       Medical Decision Making    ED Supervising Attending Note & Attestation   I was the Supervising Physician. I have seen face to face and examined the patient; reviewed history and physical, performed a substantive portion of the encounter, and discussed the medical decision making with the Medical Student, Resident, Fellow, PA and/or NP. I agree with the assessment and plan as presented unless otherwise documented as follows:     73-year-old male past medical history of CHF, COPD, hyperlipidemia, paroxysmal atrial fibrillation on Coumadin presenting to the ED with increasing shortness of breath.  Patient is poor historian but denies any fevers or chills and currently endorses cough but no chest pain or nausea or vomiting.    Vital signs reviewed: Afebrile, mildly hypertensive, 80 bpm, 90% on room air    Exam     Constitutional: No acute distress. Resting comfortably.   Head: Normocephalic, atraumatic.   Eyes: Pupils equal bilaterally, EOM grossly intact, conjunctiva normal.  Mouth/Throat: Oropharynx is clear, moist mucus membranes.   Neck: Supple. No lymphadenopathy.  Cardiovascular: Regular rate and regular  rhythm. Extremities are well-perfused.   Pulmonary/Chest: No respiratory distress, breathing comfortably on room air.    Abdominal: Soft, non-tender, non-distended. No rebound or guarding.   Musculoskeletal: No lower extremity edema.       Skin: Warm, dry, and intact.   Neurological: Patient is oriented to person, place, time, and situation. Face symmetric, hearing intact to voice, speech normal. Moves all extremities.   Psych: Mood, affect, thought content, and judgment normal.    Differential includes but is not limited to:  COPD exacerbation versus CHF exacerbation versus pneumonia versus viral illness    Amount and/or Complexity of Data Reviewed  External Data Reviewed: notes.      Labs: ordered.  Labs Reviewed   CBC WITH AUTO DIFFERENTIAL - Abnormal       Result Value    WBC 8.1      nRBC 0.0      RBC 5.16      Hemoglobin 15.6      Hematocrit 47.8      MCV 93      MCH 30.2      MCHC 32.6      RDW 14.5      Platelets 238      MPV 10.8      Neutrophils % 82.3      Immature Granulocytes %, Automated 0.2      Lymphocytes % 10.0      Monocytes % 5.6      Eosinophils % 1.2      Basophils % 0.7      Neutrophils Absolute 6.63      Immature Granulocytes Absolute, Automated 0.02      Lymphocytes Absolute 0.81 (*)     Monocytes Absolute 0.45      Eosinophils Absolute 0.10      Basophils Absolute 0.06     URINALYSIS WITH REFLEX MICROSCOPIC AND CULTURE - Abnormal    Color, Urine Light-Yellow      Appearance, Urine Clear      Specific Gravity, Urine 1.014      pH, Urine 7.0      Protein, Urine NEGATIVE      Glucose, Urine Normal      Blood, Urine 0.03 (TRACE) (*)     Ketones, Urine NEGATIVE      Bilirubin, Urine NEGATIVE      Urobilinogen, Urine Normal      Nitrite, Urine NEGATIVE      Leukocyte Esterase, Urine NEGATIVE     PROTIME-INR - Abnormal    Protime 21.4 (*)     INR 2.1 (*)     Narrative:     INR Therapeutic Range: 2.0-3.5   PROTIME-INR - Abnormal    Protime 22.8 (*)     INR 2.3 (*)     Narrative:     INR Therapeutic  Range: 2.0-3.5   BASIC METABOLIC PANEL - Abnormal    Glucose 143 (*)     Sodium 141      Potassium 3.9      Chloride 99      Bicarbonate 30      Urea Nitrogen 23      Creatinine 0.90      eGFR >90      Calcium 8.9      Anion Gap 12     DIGOXIN - Abnormal    Digoxin  0.30 (*)    PROTIME-INR - Abnormal    Protime 23.5 (*)     INR 2.3 (*)     Narrative:     INR Therapeutic Range: 2.0-3.5   BASIC METABOLIC PANEL - Abnormal    Glucose 111 (*)     Sodium 143      Potassium 3.5      Chloride 100      Bicarbonate 32 (*)     Urea Nitrogen 35 (*)     Creatinine 1.00      eGFR 81      Calcium 8.5      Anion Gap 11     CBC - Abnormal    WBC 14.3 (*)     nRBC 0.0      RBC 4.94      Hemoglobin 14.9      Hematocrit 44.3      MCV 90      MCH 30.2      MCHC 33.6      RDW 14.6 (*)     Platelets 229      MPV 11.0     PROTIME-INR - Abnormal    Protime 23.9 (*)     INR 2.4 (*)     Narrative:     INR Therapeutic Range: 2.0-3.5   BASIC METABOLIC PANEL - Abnormal    Glucose 90      Sodium 141      Potassium 3.9      Chloride 99      Bicarbonate 31      Urea Nitrogen 34 (*)     Creatinine 1.00      eGFR 81      Calcium 8.3 (*)     Anion Gap 11     PROTIME-INR - Abnormal    Protime 30.8 (*)     INR 3.1 (*)     Narrative:     INR Therapeutic Range: 2.0-3.5   BASIC METABOLIC PANEL - Abnormal    Glucose 97      Sodium 142      Potassium 3.6      Chloride 102      Bicarbonate 31      Urea Nitrogen 32 (*)     Creatinine 1.00      eGFR 81      Calcium 8.1 (*)     Anion Gap 9     URINALYSIS MICROSCOPIC WITH REFLEX CULTURE - Abnormal    WBC, Urine NONE      RBC, Urine 6-10 (*)    BASIC METABOLIC PANEL - Normal    Glucose 94      Sodium 138      Potassium 4.0      Chloride 98      Bicarbonate 31      Urea Nitrogen 24      Creatinine 0.90      eGFR >90      Calcium 9.4      Anion Gap 9     SARS-COV-2 PCR, SYMPTOMATIC - Normal    Coronavirus 2019, PCR Not Detected      Narrative:     This assay has received FDA Emergency Use Authorization (EUA) and is  only authorized for the duration of time that circumstances exist to justify the authorization of the emergency use of in vitro diagnostic tests for the detection of SARS-CoV-2 virus and/or diagnosis of COVID-19 infection under section 564(b)(1) of the Act, 21 U.S.C. 360bbb-3(b)(1). This assay is an in vitro diagnostic nucleic acid amplification test for the qualitative detection of SARS-CoV-2 from nasopharyngeal specimens and has been validated for use at [facility]. Negative results do not preclude COVID-19 infections and should not be used as the sole basis for diagnosis, treatment, or other management decisions.     INFLUENZA A AND B PCR - Normal    Flu A Result Not Detected      Flu B Result Not Detected      Narrative:     This assay is an in vitro diagnostic multiplex nucleic acid amplification test for the detection and discrimination of Influenza A & B from nasopharyngeal specimens, and has been validated for use at [facility]. Negative results do not preclude Influenza A/B infections, and should not be used as the sole basis for diagnosis, treatment, or other management decisions. If Influenza A/B and RSV PCR results are negative, testing for Parainfluenza virus, Adenovirus and Metapneumovirus is routinely performed for [facility] [place] and intensive care inpatients, and is available on other patients by placing an add-on request.   N-TERMINAL PROBNP - Normal    PROBNP 97      Narrative:     Reference ranges are based on clinical submission data. These ranges represent the 95th percentile of normal cut-off points. As NT Pro- BNP values approach 1000 pg/ml, clinical symptoms are more likely associated with CHF.   SERIAL TROPONIN, INITIAL ([facility]) - Normal    Troponin T, High Sensitivity 14     TROPONIN T, HIGH SENSITIVITY - Normal    Troponin T, High Sensitivity 13     CBC - Normal    WBC 8.8      nRBC 0.0      RBC 5.08      Hemoglobin 15.4      Hematocrit  46.2      MCV 91      MCH 30.3      MCHC 33.3      RDW 14.1      Platelets 235      MPV 10.8     MAGNESIUM - Normal    Magnesium 1.90     CBC - Normal    WBC 11.1      nRBC 0.0      RBC 4.84      Hemoglobin 14.6      Hematocrit 45.2      MCV 93      MCH 30.2      MCHC 32.3      RDW 14.4      Platelets 230     CBC - Normal    WBC 10.7      nRBC 0.0      RBC 4.82      Hemoglobin 14.4      Hematocrit 43.4      MCV 90      MCH 29.9      MCHC 33.2      RDW 14.4      Platelets 218     URINALYSIS WITH REFLEX MICROSCOPIC AND CULTURE    Narrative:     The following orders were created for panel order Urinalysis with Reflex Microscopic and Culture.  Procedure                               Abnormality         Status                     ---------                               -----------         ------                     Urinalysis with Reflex M...[[id number]]  Abnormal            Final result               Extra Urine Gray Tube[[id number]]                            Final result                 Please view results for these tests on the individual orders.   EXTRA URINE GRAY TUBE    Extra Tube Hold for add-ons.     TROPONIN T SERIES, HIGH SENSITIVITY (0, 2 HR, 6 HR)    Narrative:     The following orders were created for panel order Troponin T Series, High Sensitivity (0, 2HR, 6HR).  Procedure                               Abnormality         Status                     ---------                               -----------         ------                     Serial Troponin, Initial...[[id number]]  Normal              Final result                 Please view results for these tests on the individual orders.       Radiology: ordered and independent interpretation performed.  Chest x-ray as interpreted by me shows no large pneumothorax at this time    ECG/medicine tests: ordered and independent interpretation performed.  EKG as interpreted by me as detailed in ED course.    Lab work as interpreted by me shows CBC within normal limits,  BMP within normal limits with no electrolyte abnormality and urinalysis shows slight blood but no evidence of infection.  Initial troponin and proBNP are unremarkable and patient is COVID and flu negative.    Patient care transferred to AUTUMN Jim for disposition, likely clinical course was reassessment after treatment and possible discharge.  PATIENT REFERRED TO:  Suresh Abad DO  [address]  [facility], [facility] [address]  [facility], [facility] [address]  [phone]    Schedule an appointment as soon as possible for a visit in 3 week(s)          Huy Rene MD  7:37 PM    Attending Emergency Physician  [facility] [address]            Procedure  Procedures     Huy Rene MD  12/02/23 1943

## 2023-12-05 ENCOUNTER — HOSPITAL ENCOUNTER (OUTPATIENT)
Dept: CARDIOLOGY | Facility: HOSPITAL | Age: 73
Discharge: HOME | End: 2023-12-05
Payer: MEDICARE

## 2023-12-05 PROCEDURE — 93005 ELECTROCARDIOGRAM TRACING: CPT

## 2023-12-06 LAB
ATRIAL RATE: 73 BPM
P AXIS: 62 DEGREES
P OFFSET: 193 MS
P ONSET: 140 MS
PR INTERVAL: 166 MS
Q ONSET: 223 MS
QRS COUNT: 12 BEATS
QRS DURATION: 84 MS
QT INTERVAL: 350 MS
QTC CALCULATION(BAZETT): 385 MS
QTC FREDERICIA: 373 MS
R AXIS: -12 DEGREES
T AXIS: 73 DEGREES
T OFFSET: 398 MS
VENTRICULAR RATE: 73 BPM

## 2023-12-13 ENCOUNTER — ANTICOAGULATION - WARFARIN VISIT (OUTPATIENT)
Dept: CARDIOLOGY | Facility: HOSPITAL | Age: 73
End: 2023-12-13
Payer: MEDICARE

## 2023-12-13 DIAGNOSIS — I48.0 PAROXYSMAL ATRIAL FIBRILLATION (MULTI): Primary | ICD-10-CM

## 2023-12-13 LAB
POC INR: 2.2
POC PROTHROMBIN TIME: NORMAL

## 2023-12-13 PROCEDURE — 99211 OFF/OP EST MAY X REQ PHY/QHP: CPT | Performed by: INTERNAL MEDICINE

## 2023-12-13 NOTE — PROGRESS NOTES
Patient identification verified with 2 identifiers.    Location: [facility] - 1st Floor Antico[facility] [address] [address]    Referring Physician: Dr Abad  Enrollment/ Re-enrollment date: 2024   INR Goal: 2.0-3.0  INR monitoring is per [facility] protocol.  Anticoagulation Medication: warfarin  Indication: Atrial Fibrillation/Atrial Flutter    Subjective   Bleeding signs/symptoms:      Bruising:     Major bleeding event:    Thrombosis signs/symptoms:    Thromboembolic event:    Missed doses:    Extra doses:    Medication changes:    Dietary changes:    Change in health:    Change in activity:    Alcohol:    Other concerns:      Upcoming Surgeries:  Does the Patient Have any upcoming surgeries that require interruption in anticoagulation therapy? no  Does the patient require bridging? no      Anticoagulation Summary  As of 12/13/2023      INR goal:  2.0-3.0   TTR:  30.9 % (1.4 mo)   INR used for dosing:                 Assessment/Plan   Therapeutic     1. New dose: no change    2. Next INR: 1 month      Education provided to patient during the visit:  Patient instructed to call in interim with questions, concerns and changes.

## 2023-12-24 ENCOUNTER — APPOINTMENT (OUTPATIENT)
Dept: RADIOLOGY | Facility: HOSPITAL | Age: 73
End: 2023-12-24
Payer: MEDICARE

## 2023-12-24 ENCOUNTER — APPOINTMENT (OUTPATIENT)
Dept: CARDIOLOGY | Facility: HOSPITAL | Age: 73
End: 2023-12-24
Payer: MEDICARE

## 2023-12-24 ENCOUNTER — HOSPITAL ENCOUNTER (EMERGENCY)
Facility: HOSPITAL | Age: 73
Discharge: HOME | End: 2023-12-24
Attending: STUDENT IN AN ORGANIZED HEALTH CARE EDUCATION/TRAINING PROGRAM
Payer: MEDICARE

## 2023-12-24 VITALS
HEART RATE: 71 BPM | WEIGHT: 181 LBS | OXYGEN SATURATION: 96 % | BODY MASS INDEX: 28.41 KG/M2 | DIASTOLIC BLOOD PRESSURE: 76 MMHG | RESPIRATION RATE: 17 BRPM | HEIGHT: 67 IN | TEMPERATURE: 97.9 F | SYSTOLIC BLOOD PRESSURE: 131 MMHG

## 2023-12-24 DIAGNOSIS — J44.1 COPD EXACERBATION (MULTI): Primary | ICD-10-CM

## 2023-12-24 DIAGNOSIS — R06.2 WHEEZE: ICD-10-CM

## 2023-12-24 LAB
ALBUMIN SERPL-MCNC: 3.8 G/DL (ref 3.5–5)
ALP BLD-CCNC: 62 U/L (ref 35–125)
ALT SERPL-CCNC: 22 U/L (ref 5–40)
ANION GAP SERPL CALC-SCNC: 8 MMOL/L
AST SERPL-CCNC: 16 U/L (ref 5–40)
BASOPHILS # BLD AUTO: 0.04 X10*3/UL (ref 0–0.1)
BASOPHILS NFR BLD AUTO: 0.5 %
BILIRUB SERPL-MCNC: 0.3 MG/DL (ref 0.1–1.2)
BUN SERPL-MCNC: 28 MG/DL (ref 8–25)
CALCIUM SERPL-MCNC: 9.4 MG/DL (ref 8.5–10.4)
CHLORIDE SERPL-SCNC: 99 MMOL/L (ref 97–107)
CO2 SERPL-SCNC: 32 MMOL/L (ref 24–31)
CREAT SERPL-MCNC: 1 MG/DL (ref 0.4–1.6)
EOSINOPHIL # BLD AUTO: 0.2 X10*3/UL (ref 0–0.4)
EOSINOPHIL NFR BLD AUTO: 2.5 %
ERYTHROCYTE [DISTWIDTH] IN BLOOD BY AUTOMATED COUNT: 13.6 % (ref 11.5–14.5)
GFR SERPL CREATININE-BSD FRML MDRD: 80 ML/MIN/1.73M*2
GLUCOSE SERPL-MCNC: 134 MG/DL (ref 65–99)
HCT VFR BLD AUTO: 46.9 % (ref 41–52)
HGB BLD-MCNC: 14.9 G/DL (ref 13.5–17.5)
IMM GRANULOCYTES # BLD AUTO: 0.04 X10*3/UL (ref 0–0.5)
IMM GRANULOCYTES NFR BLD AUTO: 0.5 % (ref 0–0.9)
LYMPHOCYTES # BLD AUTO: 0.65 X10*3/UL (ref 0.8–3)
LYMPHOCYTES NFR BLD AUTO: 8 %
MCH RBC QN AUTO: 30 PG (ref 26–34)
MCHC RBC AUTO-ENTMCNC: 31.8 G/DL (ref 32–36)
MCV RBC AUTO: 94 FL (ref 80–100)
MONOCYTES # BLD AUTO: 0.38 X10*3/UL (ref 0.05–0.8)
MONOCYTES NFR BLD AUTO: 4.7 %
NEUTROPHILS # BLD AUTO: 6.85 X10*3/UL (ref 1.6–5.5)
NEUTROPHILS NFR BLD AUTO: 83.8 %
NRBC BLD-RTO: 0 /100 WBCS (ref 0–0)
PLATELET # BLD AUTO: 202 X10*3/UL (ref 150–450)
POTASSIUM SERPL-SCNC: 3.5 MMOL/L (ref 3.4–5.1)
PROT SERPL-MCNC: 6 G/DL (ref 5.9–7.9)
RBC # BLD AUTO: 4.97 X10*6/UL (ref 4.5–5.9)
SODIUM SERPL-SCNC: 139 MMOL/L (ref 133–145)
WBC # BLD AUTO: 8.2 X10*3/UL (ref 4.4–11.3)

## 2023-12-24 PROCEDURE — 93005 ELECTROCARDIOGRAM TRACING: CPT

## 2023-12-24 PROCEDURE — 71046 X-RAY EXAM CHEST 2 VIEWS: CPT

## 2023-12-24 PROCEDURE — 96374 THER/PROPH/DIAG INJ IV PUSH: CPT

## 2023-12-24 PROCEDURE — 85025 COMPLETE CBC W/AUTO DIFF WBC: CPT | Performed by: STUDENT IN AN ORGANIZED HEALTH CARE EDUCATION/TRAINING PROGRAM

## 2023-12-24 PROCEDURE — 99284 EMERGENCY DEPT VISIT MOD MDM: CPT | Performed by: STUDENT IN AN ORGANIZED HEALTH CARE EDUCATION/TRAINING PROGRAM

## 2023-12-24 PROCEDURE — 36415 COLL VENOUS BLD VENIPUNCTURE: CPT | Performed by: STUDENT IN AN ORGANIZED HEALTH CARE EDUCATION/TRAINING PROGRAM

## 2023-12-24 PROCEDURE — 2500000002 HC RX 250 W HCPCS SELF ADMINISTERED DRUGS (ALT 637 FOR MEDICARE OP, ALT 636 FOR OP/ED): Performed by: STUDENT IN AN ORGANIZED HEALTH CARE EDUCATION/TRAINING PROGRAM

## 2023-12-24 PROCEDURE — 2500000004 HC RX 250 GENERAL PHARMACY W/ HCPCS (ALT 636 FOR OP/ED): Performed by: STUDENT IN AN ORGANIZED HEALTH CARE EDUCATION/TRAINING PROGRAM

## 2023-12-24 PROCEDURE — 84075 ASSAY ALKALINE PHOSPHATASE: CPT | Performed by: STUDENT IN AN ORGANIZED HEALTH CARE EDUCATION/TRAINING PROGRAM

## 2023-12-24 RX ORDER — IPRATROPIUM BROMIDE AND ALBUTEROL SULFATE 2.5; .5 MG/3ML; MG/3ML
3 SOLUTION RESPIRATORY (INHALATION) ONCE
Status: DISCONTINUED | OUTPATIENT
Start: 2023-12-24 | End: 2023-12-24 | Stop reason: HOSPADM

## 2023-12-24 RX ORDER — IPRATROPIUM BROMIDE AND ALBUTEROL SULFATE 2.5; .5 MG/3ML; MG/3ML
3 SOLUTION RESPIRATORY (INHALATION) ONCE
Status: COMPLETED | OUTPATIENT
Start: 2023-12-24 | End: 2023-12-24

## 2023-12-24 RX ORDER — PREDNISONE 20 MG/1
40 TABLET ORAL DAILY
Qty: 10 TABLET | Refills: 0 | Status: SHIPPED | OUTPATIENT
Start: 2023-12-24 | End: 2023-12-29

## 2023-12-24 RX ADMIN — METHYLPREDNISOLONE SODIUM SUCCINATE 40 MG: 40 INJECTION, POWDER, FOR SOLUTION INTRAMUSCULAR; INTRAVENOUS at 11:48

## 2023-12-24 RX ADMIN — IPRATROPIUM BROMIDE AND ALBUTEROL SULFATE 3 ML: .5; 3 SOLUTION RESPIRATORY (INHALATION) at 11:40

## 2023-12-24 RX ADMIN — IPRATROPIUM BROMIDE AND ALBUTEROL SULFATE 3 ML: 2.5; .5 SOLUTION RESPIRATORY (INHALATION) at 11:17

## 2023-12-24 ASSESSMENT — LIFESTYLE VARIABLES
HAVE PEOPLE ANNOYED YOU BY CRITICIZING YOUR DRINKING: NO
REASON UNABLE TO ASSESS: NO
EVER HAD A DRINK FIRST THING IN THE MORNING TO STEADY YOUR NERVES TO GET RID OF A HANGOVER: NO
EVER FELT BAD OR GUILTY ABOUT YOUR DRINKING: NO
HAVE YOU EVER FELT YOU SHOULD CUT DOWN ON YOUR DRINKING: NO

## 2023-12-24 ASSESSMENT — PAIN SCALES - GENERAL
PAINLEVEL_OUTOF10: 5 - MODERATE PAIN
PAINLEVEL_OUTOF10: 0 - NO PAIN

## 2023-12-24 ASSESSMENT — COLUMBIA-SUICIDE SEVERITY RATING SCALE - C-SSRS
6. HAVE YOU EVER DONE ANYTHING, STARTED TO DO ANYTHING, OR PREPARED TO DO ANYTHING TO END YOUR LIFE?: NO
1. IN THE PAST MONTH, HAVE YOU WISHED YOU WERE DEAD OR WISHED YOU COULD GO TO SLEEP AND NOT WAKE UP?: NO
2. HAVE YOU ACTUALLY HAD ANY THOUGHTS OF KILLING YOURSELF?: NO

## 2023-12-24 ASSESSMENT — PAIN DESCRIPTION - PROGRESSION: CLINICAL_PROGRESSION: NOT CHANGED

## 2023-12-24 ASSESSMENT — PAIN - FUNCTIONAL ASSESSMENT
PAIN_FUNCTIONAL_ASSESSMENT: 0-10
PAIN_FUNCTIONAL_ASSESSMENT: 0-10

## 2023-12-24 NOTE — ED PROVIDER NOTES
HPI   Chief Complaint   Patient presents with    Shortness of Breath     wheezing       Patient is a 73-year-old male with past medical history of COPD presenting with wheezing.  Patient came in via private vehicle.  Patient states she has been wheezing for several days seems to be worse at night, affecting his sleep.  Patient also endorses wheezing typically worse with exercise, but at rest typically wheezing is not as bad but is still present.  Patient is not chronically on oxygen, but occasionally utilizes 2 L nasal cannula.  Patient endorses taking daily prednisone, and does have a breathing machine.  States he took prednisone this morning, but has not used his breathing treatment today.  Patient denies fever, chills, productive cough, chest pain, shortness of breath, abdominal pain, nausea, diarrhea.  Patient able to ambulate to ED bed.                          No data recorded                Patient History   Past Medical History:   Diagnosis Date    A-fib (CMS/[facility])     COPD (chronic obstructive pulmonary disease) (CMS/[facility])     Hyperlipidemia     Hypertension     Osteopenia     Personal history of other diseases of the circulatory system     History of angina    Personal history of other diseases of the circulatory system     History of hypertension    Personal history of other diseases of the respiratory system     History of chronic obstructive lung disease    Unspecified asthma with (acute) exacerbation     Asthma with acute exacerbation, unspecified asthma severity, unspecified whether persistent     Past Surgical History:   Procedure Laterality Date    IR ANGIOGRAM PULMONARY Bilateral 4/10/2012    IR ANGIOGRAM PULMONARY [facility]    OTHER SURGICAL HISTORY  07/23/2021    No history of surgery     Family History   Problem Relation Name Age of Onset    Stroke Mother      Lung cancer Father      Stroke Sister       Social History     Tobacco Use    Smoking status: Never    Smokeless tobacco: Never    Vaping Use    Vaping Use: Never used   Substance Use Topics    Alcohol use: Not Currently    Drug use: Never       Physical Exam   ED Triage Vitals [12/24/23 1000]   Temp Heart Rate Resp BP   36.5 °C (97.7 °F) 90 (!) 30 (!) 157/101      SpO2 Temp src Heart Rate Source Patient Position   94 % -- -- --      BP Location FiO2 (%)     -- --       Physical Exam  Constitutional:       Appearance: He is well-developed.   HENT:      Head: Normocephalic and atraumatic.   Eyes:      Extraocular Movements: Extraocular movements intact.      Pupils: Pupils are equal, round, and reactive to light.   Cardiovascular:      Rate and Rhythm: Normal rate and regular rhythm.   Pulmonary:      Effort: Pulmonary effort is normal.      Breath sounds: Decreased breath sounds and wheezing present.   Chest:      Chest wall: No tenderness.   Abdominal:      Palpations: Abdomen is soft.   Musculoskeletal:      Cervical back: Normal range of motion and neck supple.   Skin:     General: Skin is warm and dry.   Neurological:      General: No focal deficit present.      Mental Status: He is alert and oriented to person, place, and time.   Psychiatric:         Mood and Affect: Mood normal.         Behavior: Behavior normal.         ED Course & MDM   Diagnoses as of 12/24/23 1700   COPD exacerbation (CMS/[facility])   Wheeze       Medical Decision Making  Patient is a 73-year-old male with past medical history of COPD presenting with wheeze.  Conditions considered include but are not limited to: COPD exacerbation, asthma, ACS, CHF, pneumonia, PE, dissection.  Due to patient's presentation and chief complaint, basic lab work and chest x-ray ordered.  2 DuoNebs and Solu-Medrol ordered.  Patient's vitals stable upon arrival.  Patient laying in bed comfortably, in no acute respiratory distress, answering questions appropriately.  Patient's symptoms significantly improved after administration of 2 DuoNeb's and Solu-Medrol.  CBC is without infection.  CMP is  without significant electrolyte abnormality.  Chest x-ray shows stable atelectasis.  Patient vital stable throughout ED course.  I believe this patient is at low risk for complication due to benign x-ray findings, normal lab findings, and significant symptom improvement after medication administration.  A disposition of discharge is acceptable.  Return to the emergency department if new or worsening symptoms including worsening shortness of breath, chest pain, difficulty breathing, syncope.  Prescription sent for oral prednisone.  Patient agreeable to discharge with follow-up with primary care.  Patient endorses his at home breathing machine does work, so no new prescription for that was sent.    Portions of this note made with Dragon software, please be mindful of potential grammatical errors.        Medications   ipratropium-albuteroL (Duo-Neb) 0.5-2.5 mg/3 mL nebulizer solution 3 mL (3 mL nebulization Given 12/24/23 1117)   methylPREDNISolone sod succinate (PF) (SOLU-Medrol) 40 mg/mL injection 40 mg (40 mg intravenous Given 12/24/23 1148)   ipratropium-albuteroL (Duo-Neb) 0.5-2.5 mg/3 mL nebulizer solution 3 mL (3 mL nebulization Given 12/24/23 1140)         Labs Reviewed   CBC WITH AUTO DIFFERENTIAL - Abnormal       Result Value    WBC 8.2      nRBC 0.0      RBC 4.97      Hemoglobin 14.9      Hematocrit 46.9      MCV 94      MCH 30.0      MCHC 31.8 (*)     RDW 13.6      Platelets 202      Neutrophils % 83.8      Immature Granulocytes %, Automated 0.5      Lymphocytes % 8.0      Monocytes % 4.7      Eosinophils % 2.5      Basophils % 0.5      Neutrophils Absolute 6.85 (*)     Immature Granulocytes Absolute, Automated 0.04      Lymphocytes Absolute 0.65 (*)     Monocytes Absolute 0.38      Eosinophils Absolute 0.20      Basophils Absolute 0.04     COMPREHENSIVE METABOLIC PANEL - Abnormal    Glucose 134 (*)     Sodium 139      Potassium 3.5      Chloride 99      Bicarbonate 32 (*)     Urea Nitrogen 28 (*)      Creatinine 1.00      eGFR 80      Calcium 9.4      Albumin 3.8      Alkaline Phosphatase 62      Total Protein 6.0      AST 16      Bilirubin, Total 0.3      ALT 22      Anion Gap 8           XR chest 2 views   Final Result   Stable minimal subsegmental atelectatic changes in the left lung base   without acute infiltrates.        Signed by: Jeremiah Sutherland 12/24/2023 12:27 PM   Dictation workstation:   [id number]            Procedure  Procedures     Damien Galvez PA-C  12/24/23 170

## 2023-12-25 NOTE — ED PROVIDER NOTES
Patient was seen by both myself and advanced practitioner.  I performed substantive portion of the visit including all aspects of the medical decision making.  Please refer to advanced practitioner's note further workup, evaluation.    Patient is a 73-year-old male with a history of COPD that presents emergency room for evaluation of wheezing, shortness of breath.  Patient states that his symptoms are going on for the last several days however seem be worse at night and have have been affecting his sleep.  They have been slowly getting worse throughout the last 2 days and have been significantly worse with exercise.  He denies fever, chills, nausea, vomiting.  He states he does have oxygen at home to use as needed however has not been needing it recently.    On exam patient uncomfortable appearing but in no obvious distress.  He does have mild diffuse expiratory wheezing present however no rhonchi, no rales, no respiratory distress or stridor.  Vital signs are stable on arrival including normal oxygen saturation on room air.  EKG showed no evidence of STEMI.  Chest x-ray is clear showing no obvious evidence of pneumonia, pneumothorax, wide mediastinum but that shows stable atelectasis from previous imaging.  Blood work was unremarkable.  Patient was given DuoNeb breathing treatments, Solu-Medrol with significant improvement of his symptoms.  Patient feeling much better on reevaluation and history exam most consistent with a COPD exacerbation.  He will be discharged with prescription for prednisone and instructed to use this inhaler at home.  Return precautions were discussed with patient.     Naveen Whitehead, DO  12/24/23 1931

## 2023-12-31 ENCOUNTER — HOSPITAL ENCOUNTER (EMERGENCY)
Facility: HOSPITAL | Age: 73
Discharge: HOME | End: 2023-12-31
Payer: MEDICARE

## 2023-12-31 ENCOUNTER — APPOINTMENT (OUTPATIENT)
Dept: RADIOLOGY | Facility: HOSPITAL | Age: 73
End: 2023-12-31
Payer: MEDICARE

## 2023-12-31 VITALS
TEMPERATURE: 97.9 F | OXYGEN SATURATION: 94 % | DIASTOLIC BLOOD PRESSURE: 86 MMHG | WEIGHT: 180.78 LBS | SYSTOLIC BLOOD PRESSURE: 166 MMHG | HEIGHT: 67 IN | BODY MASS INDEX: 28.37 KG/M2 | RESPIRATION RATE: 16 BRPM | HEART RATE: 84 BPM

## 2023-12-31 DIAGNOSIS — S32.000A COMPRESSION FRACTURE OF LUMBAR VERTEBRA, UNSPECIFIED LUMBAR VERTEBRAL LEVEL, INITIAL ENCOUNTER (MULTI): Primary | ICD-10-CM

## 2023-12-31 DIAGNOSIS — M54.50 LOW BACK PAIN, UNSPECIFIED BACK PAIN LATERALITY, UNSPECIFIED CHRONICITY, UNSPECIFIED WHETHER SCIATICA PRESENT: ICD-10-CM

## 2023-12-31 DIAGNOSIS — R68.83 CHILL: ICD-10-CM

## 2023-12-31 LAB
ALBUMIN SERPL-MCNC: 3.7 G/DL (ref 3.5–5)
ALP BLD-CCNC: 63 U/L (ref 35–125)
ALT SERPL-CCNC: 18 U/L (ref 5–40)
ANION GAP SERPL CALC-SCNC: 9 MMOL/L
APPEARANCE UR: CLEAR
AST SERPL-CCNC: 16 U/L (ref 5–40)
BASOPHILS # BLD AUTO: 0.04 X10*3/UL (ref 0–0.1)
BASOPHILS NFR BLD AUTO: 0.4 %
BILIRUB SERPL-MCNC: 0.5 MG/DL (ref 0.1–1.2)
BILIRUB UR STRIP.AUTO-MCNC: NEGATIVE MG/DL
BUN SERPL-MCNC: 31 MG/DL (ref 8–25)
CALCIUM SERPL-MCNC: 9.6 MG/DL (ref 8.5–10.4)
CHLORIDE SERPL-SCNC: 99 MMOL/L (ref 97–107)
CO2 SERPL-SCNC: 32 MMOL/L (ref 24–31)
COLOR UR: YELLOW
CREAT SERPL-MCNC: 1.2 MG/DL (ref 0.4–1.6)
EOSINOPHIL # BLD AUTO: 0.49 X10*3/UL (ref 0–0.4)
EOSINOPHIL NFR BLD AUTO: 4.7 %
ERYTHROCYTE [DISTWIDTH] IN BLOOD BY AUTOMATED COUNT: 13.6 % (ref 11.5–14.5)
FLUAV RNA RESP QL NAA+PROBE: NOT DETECTED
FLUBV RNA RESP QL NAA+PROBE: NOT DETECTED
GFR SERPL CREATININE-BSD FRML MDRD: 65 ML/MIN/1.73M*2
GLUCOSE SERPL-MCNC: 109 MG/DL (ref 65–99)
GLUCOSE UR STRIP.AUTO-MCNC: NORMAL MG/DL
HCT VFR BLD AUTO: 50.3 % (ref 41–52)
HGB BLD-MCNC: 16.1 G/DL (ref 13.5–17.5)
HOLD SPECIMEN: NORMAL
IMM GRANULOCYTES # BLD AUTO: 0.04 X10*3/UL (ref 0–0.5)
IMM GRANULOCYTES NFR BLD AUTO: 0.4 % (ref 0–0.9)
KETONES UR STRIP.AUTO-MCNC: NEGATIVE MG/DL
LEUKOCYTE ESTERASE UR QL STRIP.AUTO: NEGATIVE
LIPASE SERPL-CCNC: <16 U/L (ref 16–63)
LYMPHOCYTES # BLD AUTO: 1.11 X10*3/UL (ref 0.8–3)
LYMPHOCYTES NFR BLD AUTO: 10.5 %
MCH RBC QN AUTO: 30 PG (ref 26–34)
MCHC RBC AUTO-ENTMCNC: 32 G/DL (ref 32–36)
MCV RBC AUTO: 94 FL (ref 80–100)
MONOCYTES # BLD AUTO: 0.86 X10*3/UL (ref 0.05–0.8)
MONOCYTES NFR BLD AUTO: 8.2 %
MUCOUS THREADS #/AREA URNS AUTO: NORMAL /LPF
NEUTROPHILS # BLD AUTO: 7.99 X10*3/UL (ref 1.6–5.5)
NEUTROPHILS NFR BLD AUTO: 75.8 %
NITRITE UR QL STRIP.AUTO: NEGATIVE
NRBC BLD-RTO: 0 /100 WBCS (ref 0–0)
PH UR STRIP.AUTO: 5.5 [PH]
PLATELET # BLD AUTO: 220 X10*3/UL (ref 150–450)
POTASSIUM SERPL-SCNC: 3.9 MMOL/L (ref 3.4–5.1)
PROT SERPL-MCNC: 6 G/DL (ref 5.9–7.9)
PROT UR STRIP.AUTO-MCNC: ABNORMAL MG/DL
RBC # BLD AUTO: 5.37 X10*6/UL (ref 4.5–5.9)
RBC # UR STRIP.AUTO: ABNORMAL /UL
RBC #/AREA URNS AUTO: NORMAL /HPF
RSV RNA RESP QL NAA+PROBE: NOT DETECTED
SARS-COV-2 RNA RESP QL NAA+PROBE: NOT DETECTED
SODIUM SERPL-SCNC: 140 MMOL/L (ref 133–145)
SP GR UR STRIP.AUTO: 1.03
UROBILINOGEN UR STRIP.AUTO-MCNC: NORMAL MG/DL
WBC # BLD AUTO: 10.5 X10*3/UL (ref 4.4–11.3)
WBC #/AREA URNS AUTO: NORMAL /HPF

## 2023-12-31 PROCEDURE — 2500000001 HC RX 250 WO HCPCS SELF ADMINISTERED DRUGS (ALT 637 FOR MEDICARE OP): Performed by: PHYSICIAN ASSISTANT

## 2023-12-31 PROCEDURE — 36415 COLL VENOUS BLD VENIPUNCTURE: CPT | Performed by: PHYSICIAN ASSISTANT

## 2023-12-31 PROCEDURE — 99284 EMERGENCY DEPT VISIT MOD MDM: CPT

## 2023-12-31 PROCEDURE — 2500000005 HC RX 250 GENERAL PHARMACY W/O HCPCS: Performed by: PHYSICIAN ASSISTANT

## 2023-12-31 PROCEDURE — 85025 COMPLETE CBC W/AUTO DIFF WBC: CPT | Performed by: PHYSICIAN ASSISTANT

## 2023-12-31 PROCEDURE — 72100 X-RAY EXAM L-S SPINE 2/3 VWS: CPT

## 2023-12-31 PROCEDURE — 81001 URINALYSIS AUTO W/SCOPE: CPT | Performed by: PHYSICIAN ASSISTANT

## 2023-12-31 PROCEDURE — 84155 ASSAY OF PROTEIN SERUM: CPT | Performed by: PHYSICIAN ASSISTANT

## 2023-12-31 PROCEDURE — 99283 EMERGENCY DEPT VISIT LOW MDM: CPT | Mod: 25

## 2023-12-31 PROCEDURE — 83690 ASSAY OF LIPASE: CPT | Performed by: PHYSICIAN ASSISTANT

## 2023-12-31 PROCEDURE — 87637 SARSCOV2&INF A&B&RSV AMP PRB: CPT | Performed by: PHYSICIAN ASSISTANT

## 2023-12-31 RX ORDER — TRAMADOL HYDROCHLORIDE 50 MG/1
50 TABLET ORAL ONCE
Status: COMPLETED | OUTPATIENT
Start: 2023-12-31 | End: 2023-12-31

## 2023-12-31 RX ORDER — ACETAMINOPHEN 325 MG/1
975 TABLET ORAL ONCE
Status: COMPLETED | OUTPATIENT
Start: 2023-12-31 | End: 2023-12-31

## 2023-12-31 RX ORDER — LIDOCAINE 560 MG/1
1 PATCH PERCUTANEOUS; TOPICAL; TRANSDERMAL ONCE
Status: DISCONTINUED | OUTPATIENT
Start: 2023-12-31 | End: 2023-12-31 | Stop reason: HOSPADM

## 2023-12-31 RX ORDER — IBUPROFEN 600 MG/1
600 TABLET ORAL EVERY 6 HOURS PRN
Qty: 20 TABLET | Refills: 0 | Status: SHIPPED | OUTPATIENT
Start: 2023-12-31 | End: 2024-03-23 | Stop reason: HOSPADM

## 2023-12-31 RX ORDER — TRAMADOL HYDROCHLORIDE 50 MG/1
50 TABLET ORAL EVERY 6 HOURS PRN
Qty: 20 TABLET | Refills: 0 | Status: SHIPPED | OUTPATIENT
Start: 2023-12-31 | End: 2024-01-05

## 2023-12-31 RX ORDER — LIDOCAINE 50 MG/G
1 PATCH TOPICAL DAILY
Qty: 10 PATCH | Refills: 0 | Status: SHIPPED | OUTPATIENT
Start: 2023-12-31 | End: 2024-01-10

## 2023-12-31 RX ADMIN — ACETAMINOPHEN 975 MG: 325 TABLET ORAL at 10:10

## 2023-12-31 RX ADMIN — TRAMADOL HYDROCHLORIDE 50 MG: 50 TABLET ORAL at 11:34

## 2023-12-31 RX ADMIN — LIDOCAINE 1 PATCH: 4 PATCH TOPICAL at 11:34

## 2023-12-31 ASSESSMENT — PAIN SCALES - GENERAL
PAINLEVEL_OUTOF10: 7
PAINLEVEL_OUTOF10: 7
PAINLEVEL_OUTOF10: 5 - MODERATE PAIN

## 2023-12-31 ASSESSMENT — PAIN DESCRIPTION - LOCATION
LOCATION: BACK

## 2023-12-31 ASSESSMENT — PAIN DESCRIPTION - DESCRIPTORS
DESCRIPTORS: ACHING
DESCRIPTORS: ACHING

## 2023-12-31 ASSESSMENT — PAIN DESCRIPTION - FREQUENCY
FREQUENCY: CONSTANT/CONTINUOUS
FREQUENCY: CONSTANT/CONTINUOUS

## 2023-12-31 ASSESSMENT — PAIN DESCRIPTION - PAIN TYPE
TYPE: CHRONIC PAIN
TYPE: ACUTE PAIN

## 2023-12-31 ASSESSMENT — PAIN DESCRIPTION - ORIENTATION: ORIENTATION: LOWER

## 2023-12-31 ASSESSMENT — PAIN - FUNCTIONAL ASSESSMENT
PAIN_FUNCTIONAL_ASSESSMENT: 0-10

## 2023-12-31 ASSESSMENT — PAIN DESCRIPTION - ONSET
ONSET: ONGOING
ONSET: ONGOING

## 2023-12-31 ASSESSMENT — PAIN DESCRIPTION - PROGRESSION: CLINICAL_PROGRESSION: NOT CHANGED

## 2023-12-31 NOTE — ED PROVIDER NOTES
HPI   Chief Complaint   Patient presents with    Back Pain     Chronic back pain, lower back       73-year-old male presented emergency department with a chief complaint of atraumatic low back pain over the last several days.  History of chronic back pain.  Additionally complains of some bodyaches and chills.  He denies chest pain or pressure.  Denies shortness of breath.  History of COPD not on oxygen.  Vital signs within normal limits.  Has chronic lower extremity edema unchanged.  Denies abdominal pain or discomfort.  Denies dysuria or diarrhea.                          Sheron Coma Scale Score: 15                  Patient History   Past Medical History:   Diagnosis Date    A-fib (CMS/[facility])     COPD (chronic obstructive pulmonary disease) (CMS/[facility])     Hyperlipidemia     Hypertension     Osteopenia     Personal history of other diseases of the circulatory system     History of angina    Personal history of other diseases of the circulatory system     History of hypertension    Personal history of other diseases of the respiratory system     History of chronic obstructive lung disease    Unspecified asthma with (acute) exacerbation     Asthma with acute exacerbation, unspecified asthma severity, unspecified whether persistent     Past Surgical History:   Procedure Laterality Date    IR ANGIOGRAM PULMONARY Bilateral 4/10/2012    IR ANGIOGRAM PULMONARY [facility]    OTHER SURGICAL HISTORY  07/23/2021    No history of surgery     Family History   Problem Relation Name Age of Onset    Stroke Mother      Lung cancer Father      Stroke Sister       Social History     Tobacco Use    Smoking status: Never    Smokeless tobacco: Never   Vaping Use    Vaping Use: Never used   Substance Use Topics    Alcohol use: Not Currently    Drug use: Never       Physical Exam   ED Triage Vitals [12/31/23 0930]   Temp Heart Rate Resp BP   36.6 °C (97.9 °F) 85 20 147/86      SpO2 Temp Source Heart Rate Source Patient Position   95  % Oral Brachial Sitting      BP Location FiO2 (%)     Right arm --       Physical Exam  Vitals and nursing note reviewed.   Constitutional:       Appearance: Normal appearance.   HENT:      Head: Normocephalic and atraumatic.      Nose: Nose normal.      Mouth/Throat:      Mouth: Mucous membranes are moist.   Cardiovascular:      Rate and Rhythm: Normal rate and regular rhythm.      Comments: Bilateral lower extremity edema  Pulmonary:      Effort: Pulmonary effort is normal.      Breath sounds: Normal breath sounds.   Abdominal:      General: Abdomen is flat.   Musculoskeletal:         General: Normal range of motion.      Cervical back: Normal range of motion.   Skin:     General: Skin is warm and dry.   Neurological:      General: No focal deficit present.      Mental Status: He is alert and oriented to person, place, and time.   Psychiatric:         Mood and Affect: Mood normal.         ED Course & MDM   Diagnoses as of 12/31/23 1224   Compression fracture of lumbar vertebra, unspecified lumbar vertebral level, initial encounter (CMS/[facility])   Low back pain, unspecified back pain laterality, unspecified chronicity, unspecified whether sciatica present   Chill       Medical Decision Making  I have seen and evaluated this patient.  Physician available for consultation.  Vital signs have been reviewed.  All laboratory and diagnostic imaging is reviewed by myself and interpreted by myself unless otherwise stated.  Additionally imaging is interpreted by radiologist.    CBC without significant leukocytosis or anemia, metabolic panel without significant renal impairment or electrolyte abnormality.  Lumbar x-ray concerning for compression fracture.  Patient with prior history of compression fracture as well.  Pain adequately controlled emergency department.  Offered admission for pain control and rehabilitation placement.  Would like to try course of pain medication at home.  States he will return if he is unable to be  successful at home.  Provided pain control and interventional radiology referral for potential kyphoplasty.  Released in good condition.    Labs Reviewed  CBC WITH AUTO DIFFERENTIAL - Abnormal     WBC                           10.5                   nRBC                          0.0                    RBC                           5.37                   Hemoglobin                    16.1                   Hematocrit                    50.3                   MCV                           94                     MCH                           30.0                   MCHC                          32.0                   RDW                           13.6                   Platelets                     220                    Neutrophils %                 75.8                   Immature Granulocytes %, Automated   0.4                    Lymphocytes %                 10.5                   Monocytes %                   8.2                    Eosinophils %                 4.7                    Basophils %                   0.4                    Neutrophils Absolute          7.99 (*)               Immature Granulocytes Absolute, Au*   0.04                   Lymphocytes Absolute          1.11                   Monocytes Absolute            0.86 (*)               Eosinophils Absolute          0.49 (*)               Basophils Absolute            0.04                COMPREHENSIVE METABOLIC PANEL - Abnormal     Glucose                       109 (*)                Sodium                        140                    Potassium                     3.9                    Chloride                      99                     Bicarbonate                   32 (*)                 Urea Nitrogen                 31 (*)                 Creatinine                    1.20                   eGFR                          65                     Calcium                       9.6                    Albumin                       3.7                     Alkaline Phosphatase          63                     Total Protein                 6.0                    AST                           16                     Bilirubin, Total              0.5                    ALT                           18                     Anion Gap                     9                   LIPASE - Abnormal     Lipase                        <16 (*)             URINALYSIS WITH REFLEX CULTURE AND MICROSCOPIC - Abnormal     Color, Urine                  Yellow                 Appearance, Urine             Clear                  Specific Gravity, Urine       1.026                  pH, Urine                     5.5                    Protein, Urine                20 (TRACE)                Glucose, Urine                Normal                 Blood, Urine                  0.2 (2+) (*)               Ketones, Urine                NEGATIVE                Bilirubin, Urine              NEGATIVE                Urobilinogen, Urine           Normal                 Nitrite, Urine                NEGATIVE                Leukocyte Esterase, Urine     NEGATIVE             RSV PCR - Normal     RSV PCR                                                  Narrative: This assay is an FDA-cleared, in vitro diagnostic nucleic acid amplification test for the detection of RSV from nasopharyngeal specimens, and has been validated for use at [facility]. Negative results do not preclude RSV infections, and should not be used as the sole basis for diagnosis, treatment, or other management decisions. If Influenza A/B and RSV PCR results are negative, testing for Parainfluenza virus, Adenovirus and Metapneumovirus is routinely performed for pediatric oncology and intensive care inpatients at [facility], and is available on other patients by placing an add-on request.                                      SARS-COV-2 AND INFLUENZA A/B PCR - Normal     Flu A Result                                         Flu B Result                                          Coronavirus 2019, PCR                                    Narrative: This assay has received FDA Emergency Use Authorization (EUA) and  is only authorized for the duration of time that circumstances exist to justify the authorization of the emergency use of in vitro diagnostic tests for the detection of SARS-CoV-2 virus and/or diagnosis of COVID-19 infection under section 564(b)(1) of the Act, 21 U.S.C. 360bbb-3(b)(1). Testing for SARS-CoV-2 is only recommended for patients who meet current clinical and/or epidemiological criteria as defined by federal, state, or local public health directives. This assay is an in vitro diagnostic nucleic acid amplification test for the qualitative detection of SARS-CoV-2, Influenza A, and Influenza B from nasopharyngeal specimens and has been validated for use at [facility]. Negative results do not preclude COVID-19 infections or Influenza A/B infections, and should not be used as the sole basis for diagnosis, treatment, or other management decisions. If Influenza A/B and RSV PCR results are negative, testing for Parainfluenza virus, Adenovirus and Metapneumovirus is routinely performed for [facility] pediatric oncology and intensive care inpatients, and is available on other patients by placing an add-on request.   URINALYSIS WITH REFLEX CULTURE AND MICROSCOPIC         Narrative: The following orders were created for panel order Urinalysis with Reflex Culture and Microscopic.                  Procedure                               Abnormality         Status                                     ---------                               -----------         ------                                     Urinalysis with Reflex C...[[id number]]  Abnormal            Final result                               Extra Urine Gray Tube[[id number]]                            In process                                                   Please view  results for these tests on the individual orders.  EXTRA URINE GRAY TUBE  URINALYSIS MICROSCOPIC WITH REFLEX CULTURE    XR lumbar spine 2-3 views   Final Result    1. There are superior endplate compression fractures with progressive    deformity of the serum place of L1 through L4 compared to 11/06/2022.    Exact age is uncertain.    2. Remote deformity, stable is present involving T10 and T12.          MACRO:    none          Signed by: Huy Humphreys 12/31/2023 10:11 AM    Dictation workstation:   [id number]              Your medication list        START taking these medications        Instructions Last Dose Given Next Dose Due   ibuprofen 600 mg tablet      Take 1 tablet (600 mg) by mouth every 6 hours if needed for mild pain (1 - 3) for up to 20 doses.       lidocaine 5 % patch  Commonly known as: Lidoderm      Place 1 patch over 12 hours on the skin once daily for 10 doses. Remove & discard patch within 12 hours or as directed by MD.       traMADol 50 mg tablet  Commonly known as: Ultram      Take 1 tablet (50 mg) by mouth every 6 hours if needed for severe pain (7 - 10) for up to 5 days.              CONTINUE taking these medications        Instructions Last Dose Given Next Dose Due   nebulizers misc      1 Units every 6 hours if needed (SOB).              ASK your doctor about these medications        Instructions Last Dose Given Next Dose Due   atorvastatin 10 mg tablet  Commonly known as: Lipitor      Take 1 tablet (10 mg) by mouth once daily at bedtime.       budesonide 0.5 mg/2 mL nebulizer solution  Commonly known as: Pulmicort      Take 2 mL (0.5 mg) by nebulization 2 times a day. Rinse mouth with water after use to reduce aftertaste and incidence of candidiasis. Do not swallow.       digoxin 125 MCG tablet  Commonly known as: Lanoxin      TAKE 1 TABLET BY MOUTH DAILY       dilTIAZem  mg 24 hr capsule  Commonly known as: Cardizem CD      TAKE 1 CAPSULE BY MOUTH DAILY       furosemide 40 mg  tablet  Commonly known as: Lasix      Take 1 tablet (40 mg) by mouth once daily. Do not start before November 4, 2023.       montelukast 10 mg tablet  Commonly known as: Singulair      Take 0.5 tablets (5 mg) by mouth once daily at bedtime.       oxygen gas therapy  Commonly known as: O2      Inhale 1 each every 8 hours.       predniSONE 20 mg tablet  Commonly known as: Deltasone  Ask about: Should I take this medication?      Take 2 tablets (40 mg) by mouth once daily for 5 days.       tiotropium 2.5 mcg/actuation inhaler  Commonly known as: Spiriva Respimat      Inhale 2 puffs once daily. Do not start before November 4, 2023.       Ventolin HFA 90 mcg/actuation inhaler  Generic drug: albuterol           albuterol 90 mcg/actuation inhaler      Inhale 2 puffs every 6 hours if needed for wheezing or shortness of breath.       warfarin 2 mg tablet  Commonly known as: Coumadin      Take as directed. If you are unsure how to take this medication, talk to your nurse or doctor.  Original instructions: Take as directed per After Visit Summary. Do not start before November 5, 2023.                 Where to Get Your Medications        These medications were sent to [id number] - [place], [place] - [address] AT [address]  [address], [facility] [address]      Phone: [phone]   ibuprofen 600 mg tablet  lidocaine 5 % patch  traMADol 50 mg tablet         Procedure  Procedures     Naveen Reid PA-C  12/31/23 4103

## 2024-01-07 LAB
ATRIAL RATE: 84 BPM
P AXIS: 61 DEGREES
P OFFSET: 173 MS
P ONSET: 128 MS
PR INTERVAL: 168 MS
Q ONSET: 212 MS
QRS COUNT: 13 BEATS
QRS DURATION: 88 MS
QT INTERVAL: 350 MS
QTC CALCULATION(BAZETT): 413 MS
QTC FREDERICIA: 391 MS
R AXIS: 18 DEGREES
T AXIS: 81 DEGREES
T OFFSET: 387 MS
VENTRICULAR RATE: 84 BPM

## 2024-01-10 ENCOUNTER — APPOINTMENT (OUTPATIENT)
Dept: CARDIOLOGY | Facility: HOSPITAL | Age: 74
End: 2024-01-10
Payer: MEDICARE

## 2024-01-11 ENCOUNTER — ANTICOAGULATION - WARFARIN VISIT (OUTPATIENT)
Dept: CARDIOLOGY | Facility: HOSPITAL | Age: 74
End: 2024-01-11
Payer: MEDICARE

## 2024-01-11 DIAGNOSIS — I48.0 PAROXYSMAL ATRIAL FIBRILLATION (MULTI): Primary | ICD-10-CM

## 2024-01-11 NOTE — PROGRESS NOTES
Left message to reschedule yesterdays canceled appointment. Will try again later in the week if no answer today

## 2024-01-15 ENCOUNTER — OFFICE VISIT (OUTPATIENT)
Dept: PRIMARY CARE | Facility: CLINIC | Age: 74
End: 2024-01-15
Payer: MEDICARE

## 2024-01-15 VITALS
BODY MASS INDEX: 26.68 KG/M2 | HEART RATE: 80 BPM | DIASTOLIC BLOOD PRESSURE: 72 MMHG | SYSTOLIC BLOOD PRESSURE: 130 MMHG | WEIGHT: 170 LBS | OXYGEN SATURATION: 93 % | RESPIRATION RATE: 16 BRPM | HEIGHT: 67 IN

## 2024-01-15 DIAGNOSIS — M54.50 CHRONIC LEFT-SIDED LOW BACK PAIN WITHOUT SCIATICA: ICD-10-CM

## 2024-01-15 DIAGNOSIS — I50.32 CHRONIC DIASTOLIC CONGESTIVE HEART FAILURE (MULTI): ICD-10-CM

## 2024-01-15 DIAGNOSIS — I10 BENIGN HYPERTENSION: ICD-10-CM

## 2024-01-15 DIAGNOSIS — I48.20 CHRONIC ATRIAL FIBRILLATION (MULTI): ICD-10-CM

## 2024-01-15 DIAGNOSIS — R00.2 PALPITATIONS: ICD-10-CM

## 2024-01-15 DIAGNOSIS — B35.1 ONYCHOMYCOSIS: ICD-10-CM

## 2024-01-15 DIAGNOSIS — I50.42 CHRONIC COMBINED SYSTOLIC AND DIASTOLIC CONGESTIVE HEART FAILURE (MULTI): ICD-10-CM

## 2024-01-15 DIAGNOSIS — Z23 IMMUNIZATION DUE: ICD-10-CM

## 2024-01-15 DIAGNOSIS — S32.000D COMPRESSION FRACTURE OF LUMBAR VERTEBRA WITH ROUTINE HEALING, UNSPECIFIED LUMBAR VERTEBRAL LEVEL, SUBSEQUENT ENCOUNTER: Primary | ICD-10-CM

## 2024-01-15 DIAGNOSIS — G89.29 CHRONIC LEFT-SIDED LOW BACK PAIN WITHOUT SCIATICA: ICD-10-CM

## 2024-01-15 DIAGNOSIS — H61.23 BILATERAL IMPACTED CERUMEN: ICD-10-CM

## 2024-01-15 PROCEDURE — 1159F MED LIST DOCD IN RCRD: CPT | Performed by: INTERNAL MEDICINE

## 2024-01-15 PROCEDURE — 3078F DIAST BP <80 MM HG: CPT | Performed by: INTERNAL MEDICINE

## 2024-01-15 PROCEDURE — 1125F AMNT PAIN NOTED PAIN PRSNT: CPT | Performed by: INTERNAL MEDICINE

## 2024-01-15 PROCEDURE — 99214 OFFICE O/P EST MOD 30 MIN: CPT | Performed by: INTERNAL MEDICINE

## 2024-01-15 PROCEDURE — G0009 ADMIN PNEUMOCOCCAL VACCINE: HCPCS | Performed by: INTERNAL MEDICINE

## 2024-01-15 PROCEDURE — 1036F TOBACCO NON-USER: CPT | Performed by: INTERNAL MEDICINE

## 2024-01-15 PROCEDURE — 3075F SYST BP GE 130 - 139MM HG: CPT | Performed by: INTERNAL MEDICINE

## 2024-01-15 RX ORDER — IBUPROFEN 200 MG
1 CAPSULE ORAL DAILY
Qty: 30 TABLET | Refills: 11 | Status: SHIPPED | OUTPATIENT
Start: 2024-01-15 | End: 2024-03-23 | Stop reason: HOSPADM

## 2024-01-15 RX ORDER — DILTIAZEM HYDROCHLORIDE 180 MG/1
180 CAPSULE, COATED, EXTENDED RELEASE ORAL DAILY
Qty: 90 CAPSULE | Refills: 3 | Status: SHIPPED | OUTPATIENT
Start: 2024-01-15 | End: 2025-01-14

## 2024-01-15 RX ORDER — DIGOXIN 125 MCG
125 TABLET ORAL DAILY
Qty: 90 TABLET | Refills: 3 | Status: SHIPPED | OUTPATIENT
Start: 2024-01-15 | End: 2025-01-14

## 2024-01-15 RX ORDER — FUROSEMIDE 40 MG/1
40 TABLET ORAL DAILY
Qty: 90 TABLET | Refills: 3 | Status: SHIPPED | OUTPATIENT
Start: 2024-01-15 | End: 2025-01-14

## 2024-01-15 RX ORDER — CHOLECALCIFEROL (VITAMIN D3) 50 MCG
50 TABLET ORAL DAILY
Qty: 30 TABLET | Refills: 11 | Status: SHIPPED | OUTPATIENT
Start: 2024-01-15 | End: 2025-01-14

## 2024-01-15 RX ORDER — TRAMADOL HYDROCHLORIDE 50 MG/1
50 TABLET ORAL EVERY 6 HOURS PRN
COMMUNITY
End: 2024-03-23 | Stop reason: HOSPADM

## 2024-01-15 ASSESSMENT — ENCOUNTER SYMPTOMS
COUGH: 0
DIZZINESS: 0
WEAKNESS: 0
ARTHRALGIAS: 0
RHINORRHEA: 0
HEMATURIA: 0
BACK PAIN: 1
VOMITING: 0
DIFFICULTY URINATING: 0
NAUSEA: 0
SORE THROAT: 0
APPETITE CHANGE: 0
NERVOUS/ANXIOUS: 0
HEADACHES: 0
LOSS OF SENSATION IN FEET: 0
CONSTIPATION: 0
JOINT SWELLING: 0
SLEEP DISTURBANCE: 0
FATIGUE: 0
FEVER: 0
DEPRESSION: 0
SINUS PAIN: 0
OCCASIONAL FEELINGS OF UNSTEADINESS: 0
FREQUENCY: 0
ABDOMINAL PAIN: 0
CHILLS: 0
DIARRHEA: 0
PALPITATIONS: 0
SHORTNESS OF BREATH: 0

## 2024-01-15 ASSESSMENT — PATIENT HEALTH QUESTIONNAIRE - PHQ9
2. FEELING DOWN, DEPRESSED OR HOPELESS: NOT AT ALL
SUM OF ALL RESPONSES TO PHQ9 QUESTIONS 1 AND 2: 0
1. LITTLE INTEREST OR PLEASURE IN DOING THINGS: NOT AT ALL

## 2024-01-15 ASSESSMENT — PAIN SCALES - GENERAL: PAINLEVEL: 4

## 2024-01-15 NOTE — PROGRESS NOTES
"Subjective   Patient ID: Reggie Munoz is a 73 y.o. male who presents for evaluation of back pain. Patient is feeling well and is accompanied by a friend. He requests medication refills. He c/o back pain and reported to ED on 12/31. His back pain occurs in the lower back and radiates to his L side. Patient lives alone but he does not drive. He is interested in physical therapy and willing to go to hospital for PT if insurance does not cover home care. Refused flu shot. Did not receive shingrix.    Diagnostics Reviewed: 12/31/2023 XR lumbar showed compression fractures from L1-L4.  Labs Reviewed:12/2023 kidney function nml, CBC nml,          Review of Systems   Constitutional:  Negative for appetite change, chills, fatigue and fever.   HENT:  Negative for ear pain, rhinorrhea, sinus pain and sore throat.    Eyes:  Negative for visual disturbance.   Respiratory:  Negative for cough and shortness of breath.    Cardiovascular:  Negative for chest pain and palpitations.   Gastrointestinal:  Negative for abdominal pain, constipation, diarrhea, nausea and vomiting.   Genitourinary:  Negative for difficulty urinating, frequency and hematuria.   Musculoskeletal:  Positive for back pain. Negative for arthralgias and joint swelling.   Skin:  Negative for rash.   Neurological:  Negative for dizziness, weakness and headaches.   Psychiatric/Behavioral:  Negative for sleep disturbance. The patient is not nervous/anxious.        Objective   /72   Pulse 80   Resp 16   Ht 1.702 m (5' 7\")   Wt 77.1 kg (170 lb)   SpO2 93%   BMI 26.63 kg/m²     Physical Exam  HENT:      Right Ear: Tympanic membrane normal. There is impacted cerumen (removed).      Left Ear: Tympanic membrane normal. There is impacted cerumen (removed).      Mouth/Throat:      Pharynx: Oropharynx is clear. No oropharyngeal exudate.   Eyes:      Conjunctiva/sclera: Conjunctivae normal.      Pupils: Pupils are equal, round, and reactive to light.   Neck: "      Thyroid: No thyromegaly.      Vascular: No carotid bruit.   Cardiovascular:      Rate and Rhythm: Regular rhythm.      Heart sounds: Normal heart sounds.   Pulmonary:      Breath sounds: Normal breath sounds.   Abdominal:      General: Bowel sounds are normal.      Palpations: Abdomen is soft. There is no hepatomegaly.      Tenderness: There is no abdominal tenderness.   Musculoskeletal:      Right lower le+ Edema present.      Left lower le+ Edema present.   Lymphadenopathy:      Cervical: No cervical adenopathy.   Skin:     General: Skin is warm.   Neurological:      Mental Status: He is alert and oriented to person, place, and time.   Psychiatric:         Mood and Affect: Mood and affect normal.         Behavior: Behavior normal. Behavior is cooperative.         Cognition and Memory: Cognition normal.         Assessment/Plan   Diagnoses and all orders for this visit:  Compression fracture of lumbar vertebra with routine healing, unspecified lumbar vertebral level, subsequent encounter  -     cholecalciferol (Vitamin D-3) 50 MCG (2000 UT) tablet; Take 1 tablet (50 mcg) by mouth once daily.  -     calcium citrate (Calcitrate) 200 mg (950 mg) tablet; Take 1 tablet (200 mg) by mouth once daily.  -     Referral to Physical Therapy; Future  -     MR lumbar spine wo IV contrast; Future  -     Referral to Pain Medicine; Future  -     carbamide peroxide (Debrox) 6.5 % otic solution; Administer 4 drops into each ear once daily for 4 days.  -     Referral to Podiatry; Future  Palpitations  -     digoxin (Lanoxin) 125 MCG tablet; TAKE 1 TABLET BY MOUTH DAILY  -     dilTIAZem CD (Cardizem CD) 180 mg 24 hr capsule; TAKE 1 CAPSULE BY MOUTH DAILY  -     carbamide peroxide (Debrox) 6.5 % otic solution; Administer 4 drops into each ear once daily for 4 days.  -     Referral to Podiatry; Future  Chronic diastolic congestive heart failure (CMS/HCC)  -     furosemide (Lasix) 40 mg tablet; Take 1 tablet (40 mg) by mouth  once daily.  -     carbamide peroxide (Debrox) 6.5 % otic solution; Administer 4 drops into each ear once daily for 4 days.  -     Referral to Podiatry; Future  Chronic left-sided low back pain without sciatica  -     carbamide peroxide (Debrox) 6.5 % otic solution; Administer 4 drops into each ear once daily for 4 days.  -     Referral to Podiatry; Future  Chronic atrial fibrillation (CMS/HCC)  -     carbamide peroxide (Debrox) 6.5 % otic solution; Administer 4 drops into each ear once daily for 4 days.  -     Referral to Podiatry; Future  Benign hypertension  -     carbamide peroxide (Debrox) 6.5 % otic solution; Administer 4 drops into each ear once daily for 4 days.  -     Referral to Podiatry; Future  Chronic combined systolic and diastolic congestive heart failure (CMS/HCC)  -     carbamide peroxide (Debrox) 6.5 % otic solution; Administer 4 drops into each ear once daily for 4 days.  -     Referral to Podiatry; Future  -     zoster vaccine-recombinant adjuvanted (Shingrix) 50 mcg/0.5 mL vaccine; Inject 0.5 mL into the muscle every 8 (eight) weeks.  Bilateral impacted cerumen  -     carbamide peroxide (Debrox) 6.5 % otic solution; Administer 4 drops into each ear once daily for 4 days.  -     Referral to Podiatry; Future  Onychomycosis  -     carbamide peroxide (Debrox) 6.5 % otic solution; Administer 4 drops into each ear once daily for 4 days.  -     Referral to Podiatry; Future      Scribe Attestation  By signing my name below, Amarilis COLLINS Scribe   attest that this documentation has been prepared under the direction and in the presence of Rajwinder Dawson MD.

## 2024-01-15 NOTE — PATIENT INSTRUCTIONS
Will arrange for [facility] PT and OT. Prevnar 20 vaccine today. Recommend RSV vaccine

## 2024-01-23 ENCOUNTER — ANTICOAGULATION - WARFARIN VISIT (OUTPATIENT)
Dept: CARDIOLOGY | Facility: HOSPITAL | Age: 74
End: 2024-01-23
Payer: MEDICARE

## 2024-01-23 DIAGNOSIS — I48.0 PAROXYSMAL ATRIAL FIBRILLATION (MULTI): Primary | ICD-10-CM

## 2024-01-29 DIAGNOSIS — E78.5 HYPERLIPIDEMIA, UNSPECIFIED HYPERLIPIDEMIA TYPE: ICD-10-CM

## 2024-01-30 RX ORDER — ATORVASTATIN CALCIUM 10 MG/1
10 TABLET, FILM COATED ORAL NIGHTLY
Qty: 90 TABLET | Refills: 3 | Status: SHIPPED | OUTPATIENT
Start: 2024-01-30

## 2024-01-31 ENCOUNTER — ANTICOAGULATION - WARFARIN VISIT (OUTPATIENT)
Dept: CARDIOLOGY | Facility: HOSPITAL | Age: 74
End: 2024-01-31
Payer: MEDICARE

## 2024-01-31 DIAGNOSIS — I48.0 PAROXYSMAL ATRIAL FIBRILLATION (MULTI): Primary | ICD-10-CM

## 2024-01-31 LAB
POC INR: 2.3
POC PROTHROMBIN TIME: NORMAL

## 2024-01-31 PROCEDURE — 85610 PROTHROMBIN TIME: CPT | Mod: QW

## 2024-01-31 PROCEDURE — 99211 OFF/OP EST MAY X REQ PHY/QHP: CPT | Performed by: INTERNAL MEDICINE

## 2024-01-31 NOTE — PROGRESS NOTES
Patient identification verified with 2 identifiers.    Location: [facility] - 1st Floor Anticoagulation Clinic [address] [id number]    Referring Physician: Dr. Abad  Enrollment/ Re-enrollment date:    INR Goal: 2.0-3.0  INR monitoring is per [facility] protocol.  Anticoagulation Medication: warfarin  Indication: Atrial Fibrillation/Atrial Flutter    Subjective   Bleeding signs/symptoms: No    Bruising: No   Major bleeding event: No  Thrombosis signs/symptoms: No  Thromboembolic event: No  Missed doses: No  Extra doses: No  Medication changes: No  Dietary changes: No  Change in health: No  Change in activity: No  Alcohol: No  Other concerns: No    Upcoming Surgeries:  Does the Patient Have any upcoming surgeries that require interruption in anticoagulation therapy? no  Does the patient require bridging? no      Anticoagulation Summary  As of 2024      INR goal:  2.0-3.0   TTR:  76.0 % (3.9 mo)   INR used for dosin.30 (2024)   Weekly warfarin total:  14 mg               Assessment/Plan   Therapeutic     1. New dose: no change    2. Next INR: 1 month      Education provided to patient during the visit:  Patient instructed to call in interim with questions, concerns and changes.

## 2024-02-27 ENCOUNTER — HOSPITAL ENCOUNTER (OUTPATIENT)
Dept: RADIOLOGY | Facility: HOSPITAL | Age: 74
Discharge: HOME | End: 2024-02-27
Payer: MEDICARE

## 2024-02-27 DIAGNOSIS — S32.000D COMPRESSION FRACTURE OF LUMBAR VERTEBRA WITH ROUTINE HEALING, UNSPECIFIED LUMBAR VERTEBRAL LEVEL, SUBSEQUENT ENCOUNTER: ICD-10-CM

## 2024-02-27 PROCEDURE — 72148 MRI LUMBAR SPINE W/O DYE: CPT | Performed by: STUDENT IN AN ORGANIZED HEALTH CARE EDUCATION/TRAINING PROGRAM

## 2024-02-27 PROCEDURE — 72148 MRI LUMBAR SPINE W/O DYE: CPT

## 2024-02-29 NOTE — RESULT ENCOUNTER NOTE
MRI of the lumbar spine shows severe spinal stenosis and pinched nerves to both legs, I recommend physical therapy and pain management referral, please refer to Dr. Grey or Dr. Little

## 2024-03-06 ENCOUNTER — ANTICOAGULATION - WARFARIN VISIT (OUTPATIENT)
Dept: CARDIOLOGY | Facility: HOSPITAL | Age: 74
End: 2024-03-06
Payer: MEDICARE

## 2024-03-06 ENCOUNTER — APPOINTMENT (OUTPATIENT)
Dept: OPHTHALMOLOGY | Facility: CLINIC | Age: 74
End: 2024-03-06
Payer: MEDICARE

## 2024-03-06 ENCOUNTER — CLINICAL SUPPORT (OUTPATIENT)
Dept: OPHTHALMOLOGY | Facility: CLINIC | Age: 74
End: 2024-03-06
Payer: MEDICARE

## 2024-03-06 ENCOUNTER — OFFICE VISIT (OUTPATIENT)
Dept: OPHTHALMOLOGY | Facility: CLINIC | Age: 74
End: 2024-03-06
Payer: MEDICARE

## 2024-03-06 DIAGNOSIS — H35.3222 EXUDATIVE AGE-RELATED MACULAR DEGENERATION OF LEFT EYE WITH INACTIVE CHOROIDAL NEOVASCULARIZATION (MULTI): Primary | ICD-10-CM

## 2024-03-06 DIAGNOSIS — H35.3111 EARLY DRY STAGE NONEXUDATIVE AGE-RELATED MACULAR DEGENERATION OF RIGHT EYE: ICD-10-CM

## 2024-03-06 DIAGNOSIS — H26.493 BILATERAL POSTERIOR CAPSULAR OPACIFICATION: ICD-10-CM

## 2024-03-06 DIAGNOSIS — I48.0 PAROXYSMAL ATRIAL FIBRILLATION (MULTI): Primary | ICD-10-CM

## 2024-03-06 DIAGNOSIS — H52.7 REFRACTIVE ERROR: ICD-10-CM

## 2024-03-06 DIAGNOSIS — Z96.1 ARTIFICIAL LENS PRESENT: ICD-10-CM

## 2024-03-06 DIAGNOSIS — H50.52 EXOPHORIA: ICD-10-CM

## 2024-03-06 LAB
POC INR: 1.6
POC PROTHROMBIN TIME: NORMAL

## 2024-03-06 PROCEDURE — 92015 DETERMINE REFRACTIVE STATE: CPT | Performed by: OPHTHALMOLOGY

## 2024-03-06 PROCEDURE — 99211 OFF/OP EST MAY X REQ PHY/QHP: CPT | Performed by: INTERNAL MEDICINE

## 2024-03-06 PROCEDURE — 85610 PROTHROMBIN TIME: CPT | Mod: QW

## 2024-03-06 PROCEDURE — 99214 OFFICE O/P EST MOD 30 MIN: CPT | Performed by: OPHTHALMOLOGY

## 2024-03-06 RX ORDER — PREDNISONE 10 MG/1
TABLET ORAL
COMMUNITY
Start: 2024-02-27 | End: 2024-03-23 | Stop reason: HOSPADM

## 2024-03-06 ASSESSMENT — CUP TO DISC RATIO
OD_RATIO: 0.35
OS_RATIO: 0.35

## 2024-03-06 ASSESSMENT — ENCOUNTER SYMPTOMS
LOSS OF SENSATION IN FEET: 0
CARDIOVASCULAR NEGATIVE: 0
NEUROLOGICAL NEGATIVE: 0
HEMATOLOGIC/LYMPHATIC NEGATIVE: 0
PSYCHIATRIC NEGATIVE: 0
ENDOCRINE NEGATIVE: 0
RESPIRATORY NEGATIVE: 0
OCCASIONAL FEELINGS OF UNSTEADINESS: 0
DEPRESSION: 0
CONSTITUTIONAL NEGATIVE: 0
ALLERGIC/IMMUNOLOGIC NEGATIVE: 0
EYES NEGATIVE: 0
MUSCULOSKELETAL NEGATIVE: 0
GASTROINTESTINAL NEGATIVE: 0

## 2024-03-06 ASSESSMENT — VISUAL ACUITY
OD_PH_SC+: -2
OD_PH_SC: 20/30
OD_SC: 20/50
METHOD: SNELLEN - LINEAR
OS_PH_SC: 20/30
OS_PH_SC+: -2
OS_SC: 20/50

## 2024-03-06 ASSESSMENT — TONOMETRY
OS_IOP_MMHG: 17
IOP_METHOD: GOLDMANN APPLANATION
OD_IOP_MMHG: 16

## 2024-03-06 ASSESSMENT — PAIN SCALES - GENERAL: PAINLEVEL: 0-NO PAIN

## 2024-03-06 ASSESSMENT — REFRACTION_WEARINGRX
OS_SPHERE: +3.25
SPECS_TYPE: READERS
OD_SPHERE: +3.25

## 2024-03-06 ASSESSMENT — EXTERNAL EXAM - RIGHT EYE: OD_EXAM: NORMAL

## 2024-03-06 ASSESSMENT — SLIT LAMP EXAM - LIDS
COMMENTS: NORMAL
COMMENTS: NORMAL

## 2024-03-06 ASSESSMENT — EXTERNAL EXAM - LEFT EYE: OS_EXAM: NORMAL

## 2024-03-06 NOTE — ASSESSMENT & PLAN NOTE
Stable mild appearing nonexudative age-related macular degeneration (AMD) right eye (OD). Hasn't been checking amsler at home, discussed using regularly. Advised to call if any new issues.

## 2024-03-06 NOTE — ASSESSMENT & PLAN NOTE
Unable to tolerate OCT mac today with body position. However, vision stable and clinical exam reveals no new macular issues, will continue to monitor with serial exam.

## 2024-03-06 NOTE — PROGRESS NOTES
Assessment/Plan   Problem List Items Addressed This Visit       Artificial lens present     Stable intraocular lens (IOL) both eyes (OU), will monitor with serial exam.          Bilateral posterior capsular opacification     Non significant, will monitor for time being.          Exudative age-related macular degeneration (CMS/HCC)     Unable to tolerate OCT mac today with body position. However, vision stable and clinical exam reveals no new macular issues, will continue to monitor with serial exam.          Nonexudative age-related macular degeneration     Stable mild appearing nonexudative age-related macular degeneration (AMD) right eye (OD). Hasn't been checking amsler at home, discussed using regularly. Advised to call if any new issues.          Exophoria     Unclear etiology, intermittent symptoms but does show some exo on exam. ? Convergence insufficiency or divergence excess. Advised to call if worsening symptoms. No ptosis or pupillary abnormalities noted. Will ask for neuro-ophth eval at patient request.          Relevant Orders    Referral to Ophthalmology    Refractive error     Discussed glasses prescription from refraction. Will provide if patient interested in keeping for records or to fill as a new set of glasses.               Provided reassurance regarding above diagnoses and care received in the office visit today. Discussed outcomes and options along with the importance of treatment compliance. Understands the importance of any follow up visits. Patient instructed to call/communicate with our office if any new issues, questions, or concerns.     Will plan to see back in 1 year full or sooner PRN

## 2024-03-06 NOTE — PATIENT INSTRUCTIONS
Thank you so much for choosing me to provide your care today!    If you were dilated your vision may remain blurry   or light sensitive for several hours.    The nature of eye and vision problems can require frequent follow up, please make every effort to adhere to any future appointments.    If you have any issues, questions, or concerns,   please do not hesitate to reach out.    If you receive a survey in regards to your care today, please mention any exceptional care my office staff and/or technicians provided.    You can reach our office at this number:  [phone]

## 2024-03-06 NOTE — ASSESSMENT & PLAN NOTE
Unclear etiology, intermittent symptoms but does show some exo on exam. ? Convergence insufficiency or divergence excess. Advised to call if worsening symptoms. No ptosis or pupillary abnormalities noted. Will ask for neuro-ophth eval at patient request.

## 2024-03-06 NOTE — PROGRESS NOTES
Patient identification verified with 2 identifiers.    Location: [facility] - 1st Floor Anticoagulation Clinic [address] [id number]    Referring Physician: Dr. Abad  Enrollment/ Re-enrollment date:    INR Goal: 2.0-3.0  INR monitoring is per [facility] protocol.  Anticoagulation Medication: warfarin  Indication: Atrial Fibrillation/Atrial Flutter    Subjective   Bleeding signs/symptoms: No    Bruising: No   Major bleeding event: No  Thrombosis signs/symptoms: No  Thromboembolic event: No  Missed doses: No  Extra doses: No  Medication changes: No  Dietary changes: No  Change in health: No  Change in activity: No  Alcohol: No  Other concerns: No    Upcoming Procedures:  Does the Patient Have any upcoming procedures that require interruption in anticoagulation therapy? no  Does the patient require bridging? no      Anticoagulation Summary  As of 3/6/2024      INR goal:  2.0-3.0   TTR:  68.4 % (5.1 mo)   INR used for dosin.60 (3/6/2024)   Weekly warfarin total:  14 mg               Assessment/Plan   Subtherapeutic     1. New dose: no change one time dose increase today  2. Next INR: 1 month      Education provided to patient during the visit:  Patient instructed to call in interim with questions, concerns and changes.   Patient educated on compliance with dosing, follow up appointments, and prescribed plan of care.

## 2024-03-18 ENCOUNTER — APPOINTMENT (OUTPATIENT)
Dept: RADIOLOGY | Facility: HOSPITAL | Age: 74
DRG: 190 | End: 2024-03-18
Payer: MEDICARE

## 2024-03-18 ENCOUNTER — APPOINTMENT (OUTPATIENT)
Dept: CARDIOLOGY | Facility: HOSPITAL | Age: 74
DRG: 190 | End: 2024-03-18
Payer: MEDICARE

## 2024-03-18 ENCOUNTER — HOSPITAL ENCOUNTER (INPATIENT)
Facility: HOSPITAL | Age: 74
LOS: 5 days | Discharge: HOME | DRG: 190 | End: 2024-03-23
Attending: EMERGENCY MEDICINE | Admitting: INTERNAL MEDICINE
Payer: MEDICARE

## 2024-03-18 DIAGNOSIS — I50.9 CONGESTIVE HEART FAILURE, UNSPECIFIED HF CHRONICITY, UNSPECIFIED HEART FAILURE TYPE (MULTI): ICD-10-CM

## 2024-03-18 DIAGNOSIS — R06.2 WHEEZING: ICD-10-CM

## 2024-03-18 DIAGNOSIS — I50.33 ACUTE ON CHRONIC DIASTOLIC CONGESTIVE HEART FAILURE (MULTI): ICD-10-CM

## 2024-03-18 DIAGNOSIS — J44.1 COPD EXACERBATION (MULTI): Primary | ICD-10-CM

## 2024-03-18 LAB
ALBUMIN SERPL-MCNC: 3.8 G/DL (ref 3.5–5)
ALP BLD-CCNC: 113 U/L (ref 35–125)
ALT SERPL-CCNC: 26 U/L (ref 5–40)
ANION GAP SERPL CALC-SCNC: 16 MMOL/L
APPEARANCE UR: CLEAR
AST SERPL-CCNC: 27 U/L (ref 5–40)
ATRIAL RATE: 106 BPM
BASOPHILS # BLD AUTO: 0.06 X10*3/UL (ref 0–0.1)
BASOPHILS NFR BLD AUTO: 0.4 %
BILIRUB SERPL-MCNC: 0.5 MG/DL (ref 0.1–1.2)
BILIRUB UR STRIP.AUTO-MCNC: NEGATIVE MG/DL
BUN SERPL-MCNC: 26 MG/DL (ref 8–25)
CALCIUM SERPL-MCNC: 9.4 MG/DL (ref 8.5–10.4)
CHLORIDE SERPL-SCNC: 97 MMOL/L (ref 97–107)
CO2 SERPL-SCNC: 27 MMOL/L (ref 24–31)
COLOR UR: ABNORMAL
CREAT SERPL-MCNC: 1.1 MG/DL (ref 0.4–1.6)
EGFRCR SERPLBLD CKD-EPI 2021: 72 ML/MIN/1.73M*2
EOSINOPHIL # BLD AUTO: 0.48 X10*3/UL (ref 0–0.4)
EOSINOPHIL NFR BLD AUTO: 3.2 %
ERYTHROCYTE [DISTWIDTH] IN BLOOD BY AUTOMATED COUNT: 13.5 % (ref 11.5–14.5)
FLUAV RNA RESP QL NAA+PROBE: NOT DETECTED
FLUBV RNA RESP QL NAA+PROBE: NOT DETECTED
GLUCOSE SERPL-MCNC: 106 MG/DL (ref 65–99)
GLUCOSE UR STRIP.AUTO-MCNC: NORMAL MG/DL
HCT VFR BLD AUTO: 51.9 % (ref 41–52)
HGB BLD-MCNC: 16.6 G/DL (ref 13.5–17.5)
HOLD SPECIMEN: NORMAL
HOLD SPECIMEN: NORMAL
IMM GRANULOCYTES # BLD AUTO: 0.07 X10*3/UL (ref 0–0.5)
IMM GRANULOCYTES NFR BLD AUTO: 0.5 % (ref 0–0.9)
INR PPP: 1.6 (ref 0.9–1.2)
KETONES UR STRIP.AUTO-MCNC: NEGATIVE MG/DL
LEUKOCYTE ESTERASE UR QL STRIP.AUTO: NEGATIVE
LYMPHOCYTES # BLD AUTO: 1.13 X10*3/UL (ref 0.8–3)
LYMPHOCYTES NFR BLD AUTO: 7.4 %
MCH RBC QN AUTO: 29.3 PG (ref 26–34)
MCHC RBC AUTO-ENTMCNC: 32 G/DL (ref 32–36)
MCV RBC AUTO: 92 FL (ref 80–100)
MONOCYTES # BLD AUTO: 1.11 X10*3/UL (ref 0.05–0.8)
MONOCYTES NFR BLD AUTO: 7.3 %
MUCOUS THREADS #/AREA URNS AUTO: ABNORMAL /LPF
NEUTROPHILS # BLD AUTO: 12.32 X10*3/UL (ref 1.6–5.5)
NEUTROPHILS NFR BLD AUTO: 81.2 %
NITRITE UR QL STRIP.AUTO: NEGATIVE
NRBC BLD-RTO: 0 /100 WBCS (ref 0–0)
P AXIS: 45 DEGREES
P OFFSET: 192 MS
P ONSET: 129 MS
PH UR STRIP.AUTO: 5.5 [PH]
PLATELET # BLD AUTO: 213 X10*3/UL (ref 150–450)
POTASSIUM SERPL-SCNC: 3.9 MMOL/L (ref 3.4–5.1)
PR INTERVAL: 188 MS
PROT SERPL-MCNC: 6.6 G/DL (ref 5.9–7.9)
PROT UR STRIP.AUTO-MCNC: ABNORMAL MG/DL
PROTHROMBIN TIME: 16.2 SECONDS (ref 9.3–12.7)
Q ONSET: 223 MS
QRS COUNT: 17 BEATS
QRS DURATION: 84 MS
QT INTERVAL: 338 MS
QTC CALCULATION(BAZETT): 448 MS
QTC FREDERICIA: 408 MS
R AXIS: -20 DEGREES
RBC # BLD AUTO: 5.67 X10*6/UL (ref 4.5–5.9)
RBC # UR STRIP.AUTO: ABNORMAL /UL
RBC #/AREA URNS AUTO: ABNORMAL /HPF
SARS-COV-2 RNA RESP QL NAA+PROBE: NOT DETECTED
SODIUM SERPL-SCNC: 140 MMOL/L (ref 133–145)
SP GR UR STRIP.AUTO: 1.02
T AXIS: 62 DEGREES
T OFFSET: 392 MS
TROPONIN T SERPL-MCNC: 27 NG/L
TROPONIN T SERPL-MCNC: 27 NG/L
TROPONIN T SERPL-MCNC: 30 NG/L
UROBILINOGEN UR STRIP.AUTO-MCNC: NORMAL MG/DL
VENTRICULAR RATE: 106 BPM
WBC # BLD AUTO: 15.2 X10*3/UL (ref 4.4–11.3)
WBC #/AREA URNS AUTO: ABNORMAL /HPF

## 2024-03-18 PROCEDURE — 87636 SARSCOV2 & INF A&B AMP PRB: CPT | Performed by: EMERGENCY MEDICINE

## 2024-03-18 PROCEDURE — 2500000004 HC RX 250 GENERAL PHARMACY W/ HCPCS (ALT 636 FOR OP/ED): Performed by: STUDENT IN AN ORGANIZED HEALTH CARE EDUCATION/TRAINING PROGRAM

## 2024-03-18 PROCEDURE — 93005 ELECTROCARDIOGRAM TRACING: CPT

## 2024-03-18 PROCEDURE — 36415 COLL VENOUS BLD VENIPUNCTURE: CPT | Performed by: EMERGENCY MEDICINE

## 2024-03-18 PROCEDURE — 9420000001 HC RT PATIENT EDUCATION 5 MIN

## 2024-03-18 PROCEDURE — 71045 X-RAY EXAM CHEST 1 VIEW: CPT | Performed by: RADIOLOGY

## 2024-03-18 PROCEDURE — 2500000004 HC RX 250 GENERAL PHARMACY W/ HCPCS (ALT 636 FOR OP/ED): Performed by: EMERGENCY MEDICINE

## 2024-03-18 PROCEDURE — 80053 COMPREHEN METABOLIC PANEL: CPT | Performed by: EMERGENCY MEDICINE

## 2024-03-18 PROCEDURE — 1210000001 HC SEMI-PRIVATE ROOM DAILY

## 2024-03-18 PROCEDURE — 2500000002 HC RX 250 W HCPCS SELF ADMINISTERED DRUGS (ALT 637 FOR MEDICARE OP, ALT 636 FOR OP/ED): Performed by: STUDENT IN AN ORGANIZED HEALTH CARE EDUCATION/TRAINING PROGRAM

## 2024-03-18 PROCEDURE — 2500000001 HC RX 250 WO HCPCS SELF ADMINISTERED DRUGS (ALT 637 FOR MEDICARE OP): Performed by: STUDENT IN AN ORGANIZED HEALTH CARE EDUCATION/TRAINING PROGRAM

## 2024-03-18 PROCEDURE — 2500000001 HC RX 250 WO HCPCS SELF ADMINISTERED DRUGS (ALT 637 FOR MEDICARE OP): Performed by: EMERGENCY MEDICINE

## 2024-03-18 PROCEDURE — 71045 X-RAY EXAM CHEST 1 VIEW: CPT

## 2024-03-18 PROCEDURE — 96374 THER/PROPH/DIAG INJ IV PUSH: CPT

## 2024-03-18 PROCEDURE — 94640 AIRWAY INHALATION TREATMENT: CPT

## 2024-03-18 PROCEDURE — 85025 COMPLETE CBC W/AUTO DIFF WBC: CPT | Performed by: EMERGENCY MEDICINE

## 2024-03-18 PROCEDURE — 84484 ASSAY OF TROPONIN QUANT: CPT | Performed by: EMERGENCY MEDICINE

## 2024-03-18 PROCEDURE — 99285 EMERGENCY DEPT VISIT HI MDM: CPT | Mod: 25

## 2024-03-18 PROCEDURE — 2500000002 HC RX 250 W HCPCS SELF ADMINISTERED DRUGS (ALT 637 FOR MEDICARE OP, ALT 636 FOR OP/ED): Performed by: EMERGENCY MEDICINE

## 2024-03-18 PROCEDURE — 96361 HYDRATE IV INFUSION ADD-ON: CPT

## 2024-03-18 PROCEDURE — 97161 PT EVAL LOW COMPLEX 20 MIN: CPT | Mod: GP

## 2024-03-18 PROCEDURE — 97530 THERAPEUTIC ACTIVITIES: CPT | Mod: GO

## 2024-03-18 PROCEDURE — 94760 N-INVAS EAR/PLS OXIMETRY 1: CPT

## 2024-03-18 PROCEDURE — 97166 OT EVAL MOD COMPLEX 45 MIN: CPT | Mod: GO

## 2024-03-18 PROCEDURE — 97116 GAIT TRAINING THERAPY: CPT | Mod: GP

## 2024-03-18 PROCEDURE — 81001 URINALYSIS AUTO W/SCOPE: CPT | Performed by: EMERGENCY MEDICINE

## 2024-03-18 PROCEDURE — 85610 PROTHROMBIN TIME: CPT | Performed by: STUDENT IN AN ORGANIZED HEALTH CARE EDUCATION/TRAINING PROGRAM

## 2024-03-18 RX ORDER — ASPIRIN 325 MG
325 TABLET ORAL ONCE
Status: COMPLETED | OUTPATIENT
Start: 2024-03-18 | End: 2024-03-18

## 2024-03-18 RX ORDER — ALBUTEROL SULFATE 0.83 MG/ML
3 SOLUTION RESPIRATORY (INHALATION) EVERY 6 HOURS PRN
Status: DISCONTINUED | OUTPATIENT
Start: 2024-03-18 | End: 2024-03-23 | Stop reason: HOSPADM

## 2024-03-18 RX ORDER — DEXAMETHASONE SODIUM PHOSPHATE 100 MG/10ML
10 INJECTION INTRAMUSCULAR; INTRAVENOUS ONCE
Status: COMPLETED | OUTPATIENT
Start: 2024-03-18 | End: 2024-03-18

## 2024-03-18 RX ORDER — IPRATROPIUM BROMIDE AND ALBUTEROL SULFATE 2.5; .5 MG/3ML; MG/3ML
3 SOLUTION RESPIRATORY (INHALATION) ONCE
Status: COMPLETED | OUTPATIENT
Start: 2024-03-18 | End: 2024-03-18

## 2024-03-18 RX ORDER — IPRATROPIUM BROMIDE AND ALBUTEROL SULFATE 2.5; .5 MG/3ML; MG/3ML
3 SOLUTION RESPIRATORY (INHALATION)
Status: DISCONTINUED | OUTPATIENT
Start: 2024-03-18 | End: 2024-03-19

## 2024-03-18 RX ORDER — ACETAMINOPHEN 325 MG/1
650 TABLET ORAL EVERY 4 HOURS PRN
Status: DISCONTINUED | OUTPATIENT
Start: 2024-03-18 | End: 2024-03-23 | Stop reason: HOSPADM

## 2024-03-18 RX ORDER — DIGOXIN 125 MCG
125 TABLET ORAL DAILY
Status: DISCONTINUED | OUTPATIENT
Start: 2024-03-18 | End: 2024-03-23 | Stop reason: HOSPADM

## 2024-03-18 RX ORDER — ATORVASTATIN CALCIUM 10 MG/1
10 TABLET, FILM COATED ORAL NIGHTLY
Status: DISCONTINUED | OUTPATIENT
Start: 2024-03-18 | End: 2024-03-23 | Stop reason: HOSPADM

## 2024-03-18 RX ORDER — PANTOPRAZOLE SODIUM 40 MG/10ML
40 INJECTION, POWDER, LYOPHILIZED, FOR SOLUTION INTRAVENOUS DAILY
Status: DISCONTINUED | OUTPATIENT
Start: 2024-03-18 | End: 2024-03-23 | Stop reason: HOSPADM

## 2024-03-18 RX ORDER — PANTOPRAZOLE SODIUM 40 MG/1
40 TABLET, DELAYED RELEASE ORAL DAILY
Status: DISCONTINUED | OUTPATIENT
Start: 2024-03-18 | End: 2024-03-23 | Stop reason: HOSPADM

## 2024-03-18 RX ORDER — ACETAMINOPHEN 650 MG/1
650 SUPPOSITORY RECTAL EVERY 4 HOURS PRN
Status: DISCONTINUED | OUTPATIENT
Start: 2024-03-18 | End: 2024-03-23 | Stop reason: HOSPADM

## 2024-03-18 RX ORDER — FUROSEMIDE 40 MG/1
40 TABLET ORAL DAILY
Status: DISCONTINUED | OUTPATIENT
Start: 2024-03-18 | End: 2024-03-23 | Stop reason: HOSPADM

## 2024-03-18 RX ORDER — PREDNISONE 20 MG/1
20 TABLET ORAL
Status: DISCONTINUED | OUTPATIENT
Start: 2024-03-18 | End: 2024-03-23 | Stop reason: HOSPADM

## 2024-03-18 RX ORDER — BUDESONIDE 0.5 MG/2ML
0.5 INHALANT ORAL
Status: DISCONTINUED | OUTPATIENT
Start: 2024-03-18 | End: 2024-03-23 | Stop reason: HOSPADM

## 2024-03-18 RX ORDER — ONDANSETRON 4 MG/1
4 TABLET, ORALLY DISINTEGRATING ORAL EVERY 8 HOURS PRN
Status: DISCONTINUED | OUTPATIENT
Start: 2024-03-18 | End: 2024-03-23 | Stop reason: HOSPADM

## 2024-03-18 RX ORDER — WARFARIN 2 MG/1
2 TABLET ORAL DAILY
Status: DISCONTINUED | OUTPATIENT
Start: 2024-03-18 | End: 2024-03-23 | Stop reason: HOSPADM

## 2024-03-18 RX ORDER — DILTIAZEM HYDROCHLORIDE 180 MG/1
180 CAPSULE, COATED, EXTENDED RELEASE ORAL DAILY
Status: DISCONTINUED | OUTPATIENT
Start: 2024-03-18 | End: 2024-03-23 | Stop reason: HOSPADM

## 2024-03-18 RX ORDER — ONDANSETRON HYDROCHLORIDE 2 MG/ML
4 INJECTION, SOLUTION INTRAVENOUS EVERY 8 HOURS PRN
Status: DISCONTINUED | OUTPATIENT
Start: 2024-03-18 | End: 2024-03-23 | Stop reason: HOSPADM

## 2024-03-18 RX ORDER — TALC
3 POWDER (GRAM) TOPICAL NIGHTLY
Status: DISCONTINUED | OUTPATIENT
Start: 2024-03-18 | End: 2024-03-23 | Stop reason: HOSPADM

## 2024-03-18 RX ORDER — ACETAMINOPHEN 160 MG/5ML
650 SOLUTION ORAL EVERY 4 HOURS PRN
Status: DISCONTINUED | OUTPATIENT
Start: 2024-03-18 | End: 2024-03-23 | Stop reason: HOSPADM

## 2024-03-18 RX ORDER — POLYETHYLENE GLYCOL 3350 17 G/17G
17 POWDER, FOR SOLUTION ORAL DAILY
Status: DISCONTINUED | OUTPATIENT
Start: 2024-03-18 | End: 2024-03-23 | Stop reason: HOSPADM

## 2024-03-18 RX ORDER — CHOLECALCIFEROL (VITAMIN D3) 50 MCG
2000 TABLET ORAL DAILY
Status: DISCONTINUED | OUTPATIENT
Start: 2024-03-18 | End: 2024-03-23 | Stop reason: HOSPADM

## 2024-03-18 RX ADMIN — ATORVASTATIN CALCIUM 10 MG: 10 TABLET, FILM COATED ORAL at 21:40

## 2024-03-18 RX ADMIN — Medication 2000 UNITS: at 09:39

## 2024-03-18 RX ADMIN — DIGOXIN 125 MCG: 125 TABLET ORAL at 09:39

## 2024-03-18 RX ADMIN — SODIUM CHLORIDE 500 ML: 900 INJECTION, SOLUTION INTRAVENOUS at 05:16

## 2024-03-18 RX ADMIN — FUROSEMIDE 40 MG: 40 TABLET ORAL at 09:39

## 2024-03-18 RX ADMIN — PANTOPRAZOLE SODIUM 40 MG: 40 TABLET, DELAYED RELEASE ORAL at 09:39

## 2024-03-18 RX ADMIN — IPRATROPIUM BROMIDE AND ALBUTEROL SULFATE 3 ML: 2.5; .5 SOLUTION RESPIRATORY (INHALATION) at 13:24

## 2024-03-18 RX ADMIN — IPRATROPIUM BROMIDE AND ALBUTEROL SULFATE 3 ML: 2.5; .5 SOLUTION RESPIRATORY (INHALATION) at 06:12

## 2024-03-18 RX ADMIN — Medication 3 MG: at 21:41

## 2024-03-18 RX ADMIN — PREDNISONE 20 MG: 20 TABLET ORAL at 09:39

## 2024-03-18 RX ADMIN — IPRATROPIUM BROMIDE AND ALBUTEROL SULFATE 3 ML: 2.5; .5 SOLUTION RESPIRATORY (INHALATION) at 20:23

## 2024-03-18 RX ADMIN — ASPIRIN 325 MG: 325 TABLET ORAL at 06:12

## 2024-03-18 RX ADMIN — DEXAMETHASONE SODIUM PHOSPHATE 10 MG: 10 INJECTION INTRAMUSCULAR; INTRAVENOUS at 05:16

## 2024-03-18 RX ADMIN — BUDESONIDE INHALATION 0.5 MG: 0.5 SUSPENSION RESPIRATORY (INHALATION) at 20:23

## 2024-03-18 RX ADMIN — AZITHROMYCIN MONOHYDRATE 500 MG: 500 INJECTION, POWDER, LYOPHILIZED, FOR SOLUTION INTRAVENOUS at 08:09

## 2024-03-18 RX ADMIN — PREDNISONE 20 MG: 20 TABLET ORAL at 16:47

## 2024-03-18 RX ADMIN — WARFARIN SODIUM 2 MG: 2 TABLET ORAL at 16:47

## 2024-03-18 RX ADMIN — DILTIAZEM HYDROCHLORIDE 180 MG: 180 CAPSULE, COATED, EXTENDED RELEASE ORAL at 09:39

## 2024-03-18 RX ADMIN — IPRATROPIUM BROMIDE AND ALBUTEROL SULFATE 3 ML: 2.5; .5 SOLUTION RESPIRATORY (INHALATION) at 05:16

## 2024-03-18 SDOH — ECONOMIC STABILITY: INCOME INSECURITY: IN THE PAST 12 MONTHS, HAS THE ELECTRIC, GAS, OIL, OR WATER COMPANY THREATENED TO SHUT OFF SERVICE IN YOUR HOME?: NO

## 2024-03-18 SDOH — SOCIAL STABILITY: SOCIAL INSECURITY: WITHIN THE LAST YEAR, HAVE YOU BEEN AFRAID OF YOUR PARTNER OR EX-PARTNER?: NO

## 2024-03-18 SDOH — ECONOMIC STABILITY: FOOD INSECURITY: WITHIN THE PAST 12 MONTHS, THE FOOD YOU BOUGHT JUST DIDN'T LAST AND YOU DIDN'T HAVE MONEY TO GET MORE.: NEVER TRUE

## 2024-03-18 SDOH — ECONOMIC STABILITY: INCOME INSECURITY: HOW HARD IS IT FOR YOU TO PAY FOR THE VERY BASICS LIKE FOOD, HOUSING, MEDICAL CARE, AND HEATING?: NOT VERY HARD

## 2024-03-18 SDOH — HEALTH STABILITY: MENTAL HEALTH: HOW OFTEN DO YOU HAVE A DRINK CONTAINING ALCOHOL?: NEVER

## 2024-03-18 SDOH — SOCIAL STABILITY: SOCIAL NETWORK: ARE YOU MARRIED, WIDOWED, DIVORCED, SEPARATED, NEVER MARRIED, OR LIVING WITH A PARTNER?: NEVER MARRIED

## 2024-03-18 SDOH — SOCIAL STABILITY: SOCIAL NETWORK: HOW OFTEN DO YOU GET TOGETHER WITH FRIENDS OR RELATIVES?: THREE TIMES A WEEK

## 2024-03-18 SDOH — HEALTH STABILITY: MENTAL HEALTH: HOW OFTEN DO YOU HAVE 6 OR MORE DRINKS ON ONE OCCASION?: NEVER

## 2024-03-18 SDOH — SOCIAL STABILITY: SOCIAL INSECURITY: DOES ANYONE TRY TO KEEP YOU FROM HAVING/CONTACTING OTHER FRIENDS OR DOING THINGS OUTSIDE YOUR HOME?: NO

## 2024-03-18 SDOH — ECONOMIC STABILITY: HOUSING INSECURITY: IN THE LAST 12 MONTHS, HOW MANY PLACES HAVE YOU LIVED?: 1

## 2024-03-18 SDOH — SOCIAL STABILITY: SOCIAL NETWORK: HOW OFTEN DO YOU ATTENT MEETINGS OF THE CLUB OR ORGANIZATION YOU BELONG TO?: NEVER

## 2024-03-18 SDOH — ECONOMIC STABILITY: HOUSING INSECURITY
IN THE LAST 12 MONTHS, WAS THERE A TIME WHEN YOU DID NOT HAVE A STEADY PLACE TO SLEEP OR SLEPT IN A SHELTER (INCLUDING NOW)?: NO

## 2024-03-18 SDOH — SOCIAL STABILITY: SOCIAL NETWORK: HOW OFTEN DO YOU ATTEND CHURCH OR RELIGIOUS SERVICES?: MORE THAN 4 TIMES PER YEAR

## 2024-03-18 SDOH — SOCIAL STABILITY: SOCIAL INSECURITY: WERE YOU ABLE TO COMPLETE ALL THE BEHAVIORAL HEALTH SCREENINGS?: YES

## 2024-03-18 SDOH — SOCIAL STABILITY: SOCIAL INSECURITY: DO YOU FEEL ANYONE HAS EXPLOITED OR TAKEN ADVANTAGE OF YOU FINANCIALLY OR OF YOUR PERSONAL PROPERTY?: NO

## 2024-03-18 SDOH — SOCIAL STABILITY: SOCIAL INSECURITY: WITHIN THE LAST YEAR, HAVE YOU BEEN HUMILIATED OR EMOTIONALLY ABUSED IN OTHER WAYS BY YOUR PARTNER OR EX-PARTNER?: NO

## 2024-03-18 SDOH — HEALTH STABILITY: PHYSICAL HEALTH: ON AVERAGE, HOW MANY MINUTES DO YOU ENGAGE IN EXERCISE AT THIS LEVEL?: 0 MIN

## 2024-03-18 SDOH — HEALTH STABILITY: MENTAL HEALTH: HOW MANY STANDARD DRINKS CONTAINING ALCOHOL DO YOU HAVE ON A TYPICAL DAY?: PATIENT DOES NOT DRINK

## 2024-03-18 SDOH — ECONOMIC STABILITY: FOOD INSECURITY: WITHIN THE PAST 12 MONTHS, YOU WORRIED THAT YOUR FOOD WOULD RUN OUT BEFORE YOU GOT MONEY TO BUY MORE.: NEVER TRUE

## 2024-03-18 SDOH — SOCIAL STABILITY: SOCIAL INSECURITY: ARE YOU OR HAVE YOU BEEN THREATENED OR ABUSED PHYSICALLY, EMOTIONALLY, OR SEXUALLY BY ANYONE?: NO

## 2024-03-18 SDOH — HEALTH STABILITY: PHYSICAL HEALTH: ON AVERAGE, HOW MANY DAYS PER WEEK DO YOU ENGAGE IN MODERATE TO STRENUOUS EXERCISE (LIKE A BRISK WALK)?: 0 DAYS

## 2024-03-18 SDOH — SOCIAL STABILITY: SOCIAL NETWORK
DO YOU BELONG TO ANY CLUBS OR ORGANIZATIONS SUCH AS CHURCH GROUPS UNIONS, FRATERNAL OR ATHLETIC GROUPS, OR SCHOOL GROUPS?: NO

## 2024-03-18 SDOH — SOCIAL STABILITY: SOCIAL NETWORK
IN A TYPICAL WEEK, HOW MANY TIMES DO YOU TALK ON THE PHONE WITH FAMILY, FRIENDS, OR NEIGHBORS?: MORE THAN THREE TIMES A WEEK

## 2024-03-18 SDOH — ECONOMIC STABILITY: TRANSPORTATION INSECURITY
IN THE PAST 12 MONTHS, HAS LACK OF TRANSPORTATION KEPT YOU FROM MEETINGS, WORK, OR FROM GETTING THINGS NEEDED FOR DAILY LIVING?: NO

## 2024-03-18 SDOH — ECONOMIC STABILITY: INCOME INSECURITY: IN THE LAST 12 MONTHS, WAS THERE A TIME WHEN YOU WERE NOT ABLE TO PAY THE MORTGAGE OR RENT ON TIME?: NO

## 2024-03-18 SDOH — SOCIAL STABILITY: SOCIAL INSECURITY: ARE THERE ANY APPARENT SIGNS OF INJURIES/BEHAVIORS THAT COULD BE RELATED TO ABUSE/NEGLECT?: NO

## 2024-03-18 SDOH — SOCIAL STABILITY: SOCIAL INSECURITY: ABUSE: ADULT

## 2024-03-18 SDOH — SOCIAL STABILITY: SOCIAL INSECURITY: HAS ANYONE EVER THREATENED TO HURT YOUR FAMILY OR YOUR PETS?: NO

## 2024-03-18 SDOH — ECONOMIC STABILITY: TRANSPORTATION INSECURITY
IN THE PAST 12 MONTHS, HAS THE LACK OF TRANSPORTATION KEPT YOU FROM MEDICAL APPOINTMENTS OR FROM GETTING MEDICATIONS?: NO

## 2024-03-18 SDOH — SOCIAL STABILITY: SOCIAL INSECURITY: HAVE YOU HAD THOUGHTS OF HARMING ANYONE ELSE?: NO

## 2024-03-18 SDOH — SOCIAL STABILITY: SOCIAL INSECURITY: DO YOU FEEL UNSAFE GOING BACK TO THE PLACE WHERE YOU ARE LIVING?: NO

## 2024-03-18 ASSESSMENT — ACTIVITIES OF DAILY LIVING (ADL)
HEARING - RIGHT EAR: FUNCTIONAL
BATHING_ASSISTANCE: NOT PERFORMED
ADL_ASSISTANCE: INDEPENDENT
PATIENT'S MEMORY ADEQUATE TO SAFELY COMPLETE DAILY ACTIVITIES?: YES
BATHING: NEEDS ASSISTANCE
ADEQUATE_TO_COMPLETE_ADL: YES
WALKS IN HOME: NEEDS ASSISTANCE
ADL_ASSISTANCE: INDEPENDENT
FEEDING YOURSELF: INDEPENDENT
TOILETING: NEEDS ASSISTANCE
HEARING - LEFT EAR: FUNCTIONAL
GROOMING: NEEDS ASSISTANCE
ADLS_ADDRESSED: YES
JUDGMENT_ADEQUATE_SAFELY_COMPLETE_DAILY_ACTIVITIES: YES
DRESSING YOURSELF: INDEPENDENT

## 2024-03-18 ASSESSMENT — COGNITIVE AND FUNCTIONAL STATUS - GENERAL
HELP NEEDED FOR BATHING: A LITTLE
MOBILITY SCORE: 17
DRESSING REGULAR LOWER BODY CLOTHING: A LITTLE
PERSONAL GROOMING: A LITTLE
DAILY ACTIVITIY SCORE: 18
DAILY ACTIVITIY SCORE: 19
TURNING FROM BACK TO SIDE WHILE IN FLAT BAD: A LITTLE
HELP NEEDED FOR BATHING: A LITTLE
DRESSING REGULAR LOWER BODY CLOTHING: A LITTLE
MOVING FROM LYING ON BACK TO SITTING ON SIDE OF FLAT BED WITH BEDRAILS: A LITTLE
WALKING IN HOSPITAL ROOM: A LITTLE
DRESSING REGULAR LOWER BODY CLOTHING: A LITTLE
CLIMB 3 TO 5 STEPS WITH RAILING: A LOT
DRESSING REGULAR UPPER BODY CLOTHING: A LITTLE
HELP NEEDED FOR BATHING: A LITTLE
CLIMB 3 TO 5 STEPS WITH RAILING: A LOT
WALKING IN HOSPITAL ROOM: A LITTLE
MOVING TO AND FROM BED TO CHAIR: A LITTLE
EATING MEALS: A LITTLE
STANDING UP FROM CHAIR USING ARMS: A LITTLE
PATIENT BASELINE BEDBOUND: NO
DAILY ACTIVITIY SCORE: 19
CLIMB 3 TO 5 STEPS WITH RAILING: A LOT
WALKING IN HOSPITAL ROOM: A LITTLE
STANDING UP FROM CHAIR USING ARMS: A LITTLE
MOVING FROM LYING ON BACK TO SITTING ON SIDE OF FLAT BED WITH BEDRAILS: A LITTLE
PERSONAL GROOMING: A LITTLE
DRESSING REGULAR UPPER BODY CLOTHING: A LITTLE
TOILETING: A LITTLE
TURNING FROM BACK TO SIDE WHILE IN FLAT BAD: A LITTLE
DRESSING REGULAR UPPER BODY CLOTHING: A LITTLE
MOBILITY SCORE: 19
TOILETING: A LITTLE
MOBILITY SCORE: 17
MOVING TO AND FROM BED TO CHAIR: A LITTLE
MOVING TO AND FROM BED TO CHAIR: A LITTLE
STANDING UP FROM CHAIR USING ARMS: A LITTLE
TOILETING: A LITTLE
PERSONAL GROOMING: A LITTLE

## 2024-03-18 ASSESSMENT — PAIN SCALES - GENERAL
PAINLEVEL_OUTOF10: 0 - NO PAIN

## 2024-03-18 ASSESSMENT — ENCOUNTER SYMPTOMS
NEUROLOGICAL NEGATIVE: 1
ENDOCRINE NEGATIVE: 1
HEADACHES: 0
CONSTITUTIONAL NEGATIVE: 1
CHILLS: 0
SHORTNESS OF BREATH: 1
MUSCULOSKELETAL NEGATIVE: 1
VOMITING: 0
CARDIOVASCULAR NEGATIVE: 1
NAUSEA: 0
ABDOMINAL DISTENTION: 0
WHEEZING: 1
FATIGUE: 1
EYES NEGATIVE: 1
COUGH: 1
HEMATOLOGIC/LYMPHATIC NEGATIVE: 1
TROUBLE SWALLOWING: 0
GASTROINTESTINAL NEGATIVE: 1
DIFFICULTY URINATING: 0
PSYCHIATRIC NEGATIVE: 1
ALLERGIC/IMMUNOLOGIC NEGATIVE: 1

## 2024-03-18 ASSESSMENT — PAIN - FUNCTIONAL ASSESSMENT
PAIN_FUNCTIONAL_ASSESSMENT: 0-10

## 2024-03-18 ASSESSMENT — LIFESTYLE VARIABLES
HOW OFTEN DO YOU HAVE 6 OR MORE DRINKS ON ONE OCCASION: NEVER
HOW OFTEN DO YOU HAVE A DRINK CONTAINING ALCOHOL: NEVER
AUDIT-C TOTAL SCORE: 0
SKIP TO QUESTIONS 9-10: 1
AUDIT-C TOTAL SCORE: 0
SKIP TO QUESTIONS 9-10: 1
HOW MANY STANDARD DRINKS CONTAINING ALCOHOL DO YOU HAVE ON A TYPICAL DAY: PATIENT DOES NOT DRINK
AUDIT-C TOTAL SCORE: 0

## 2024-03-18 ASSESSMENT — PAIN DESCRIPTION - PROGRESSION: CLINICAL_PROGRESSION: NOT CHANGED

## 2024-03-18 NOTE — CONSULTS
Consults    Reason For Consult  Acute hypoxic respiratory failure secondary to acute COPD exacerbation       History Of Present Illness  Reggie Munoz is a 73 y.o. male whose past medical history includes but is not limited to COPD, asthma, paroxysmal fibrillation and chronic diastolic congestive heart failure who presented to [facility] Emergency Department this morning for evaluation of worsening shortness of breath and mostly nonproductive cough.  He was placed on 3 L nasal cannula oxygen upon arrival for O2 sats greater than or equal to 92%.  He denies fevers, chills, nausea, vomiting, peripheral edema, chest or abdominal pain.      Past Medical History  Past Medical History:   Diagnosis Date    A-fib (CMS/[facility])     COPD (chronic obstructive pulmonary disease) (CMS/[facility])     Hyperlipidemia     Hypertension     Nonexudative age-related macular degeneration, right eye, early dry stage     Osteopenia     Other secondary cataract, bilateral     Personal history of other diseases of the circulatory system     History of angina    Personal history of other diseases of the circulatory system     History of hypertension    Personal history of other diseases of the respiratory system     History of chronic obstructive lung disease    Pseudophakia     Refractive error     Unspecified asthma with (acute) exacerbation     Asthma with acute exacerbation, unspecified asthma severity, unspecified whether persistent       Surgical History  Past Surgical History:   Procedure Laterality Date    CATARACT EXTRACTION      IR ANGIOGRAM PULMONARY Bilateral 04/10/2012    IR ANGIOGRAM PULMONARY [facility]    OTHER SURGICAL HISTORY  07/23/2021    No history of surgery        Social History  Social History     Tobacco Use    Smoking status: Never     Passive exposure: Never    Smokeless tobacco: Never   Vaping Use    Vaping Use: Never used   Substance Use Topics    Alcohol use: Not Currently    Drug use: Never  "      Family History  Family History   Problem Relation Name Age of Onset    Stroke Mother      Lung cancer Father      Stroke Sister            Allergies  Patient has no known allergies.    Review of Systems   Constitutional: Negative.    HENT: Negative.     Eyes: Negative.    Respiratory:  Positive for cough and shortness of breath.    Cardiovascular: Negative.    Gastrointestinal: Negative.    Endocrine: Negative.    Genitourinary: Negative.    Musculoskeletal: Negative.    Allergic/Immunologic: Negative.    Neurological: Negative.    Hematological: Negative.    Psychiatric/Behavioral: Negative.          Physical Exam  Vitals and nursing note reviewed.   Constitutional:       Appearance: Normal appearance.   HENT:      Head: Normocephalic and atraumatic.      Nose: Nose normal.      Mouth/Throat:      Mouth: Mucous membranes are moist.   Eyes:      Extraocular Movements: Extraocular movements intact.      Conjunctiva/sclera: Conjunctivae normal.      Pupils: Pupils are equal, round, and reactive to light.   Cardiovascular:      Rate and Rhythm: Normal rate and regular rhythm.      Pulses: Normal pulses.      Heart sounds: Normal heart sounds.   Pulmonary:      Effort: Pulmonary effort is normal.      Breath sounds: Normal breath sounds.   Abdominal:      General: Bowel sounds are normal.      Palpations: Abdomen is soft.   Musculoskeletal:         General: Normal range of motion.   Skin:     General: Skin is warm and dry.      Capillary Refill: Capillary refill takes less than 2 seconds.   Neurological:      General: No focal deficit present.      Mental Status: He is alert and oriented to person, place, and time.   Psychiatric:         Mood and Affect: Mood normal.         Behavior: Behavior normal.          Vital Signs  Blood pressure (!) 146/92, pulse 100, temperature 37 °C (98.6 °F), temperature source Oral, resp. rate 18, height 1.702 m (5' 7\"), weight 80.7 kg (178 lb), SpO2 95 %.  Oxygen Therapy  SpO2: 95 " %  Medical Gas Therapy: Supplemental oxygen  O2 Delivery Method: Nasal cannula (3L)         Intake/Output Summary (Last 24 hours) at 3/18/2024 1040  Last data filed at 3/18/2024 0556  Gross per 24 hour   Intake 333.33 ml   Output --   Net 333.33 ml      atorvastatin, 10 mg, oral, Nightly  budesonide, 0.5 mg, nebulization, BID  cholecalciferol, 2,000 Units, oral, Daily  digoxin, 125 mcg, oral, Daily  dilTIAZem CD, 180 mg, oral, Daily  furosemide, 40 mg, oral, Daily  ipratropium-albuteroL, 3 mL, nebulization, q6h  melatonin, 3 mg, oral, Nightly  pantoprazole, 40 mg, oral, Daily   Or  pantoprazole, 40 mg, intravenous, Daily  polyethylene glycol, 17 g, oral, Daily  predniSONE, 20 mg, oral, BID with meals  warfarin, 2 mg, oral, Daily       PRN medications: acetaminophen **OR** acetaminophen **OR** acetaminophen, albuterol, ondansetron ODT **OR** ondansetron       Relevant Results  Results for orders placed or performed during the hospital encounter of 03/18/24 (from the past 24 hour(s))   ECG 12 lead   Result Value Ref Range    Ventricular Rate 106 BPM    Atrial Rate 106 BPM    MA Interval 188 ms    QRS Duration 84 ms    QT Interval 338 ms    QTC Calculation(Bazett) 448 ms    P Axis 45 degrees    R Axis -20 degrees    T Axis 62 degrees    QRS Count 17 beats    Q Onset 223 ms    P Onset 129 ms    P Offset 192 ms    T Offset 392 ms    QTC Fredericia 408 ms   CBC and Auto Differential   Result Value Ref Range    WBC 15.2 (H) 4.4 - 11.3 x10*3/uL    nRBC 0.0 0.0 - 0.0 /100 WBCs    RBC 5.67 4.50 - 5.90 x10*6/uL    Hemoglobin 16.6 13.5 - 17.5 g/dL    Hematocrit 51.9 41.0 - 52.0 %    MCV 92 80 - 100 fL    MCH 29.3 26.0 - 34.0 pg    MCHC 32.0 32.0 - 36.0 g/dL    RDW 13.5 11.5 - 14.5 %    Platelets 213 150 - 450 x10*3/uL    Neutrophils % 81.2 40.0 - 80.0 %    Immature Granulocytes %, Automated 0.5 0.0 - 0.9 %    Lymphocytes % 7.4 13.0 - 44.0 %    Monocytes % 7.3 2.0 - 10.0 %    Eosinophils % 3.2 0.0 - 6.0 %    Basophils % 0.4 0.0  - 2.0 %    Neutrophils Absolute 12.32 (H) 1.60 - 5.50 x10*3/uL    Immature Granulocytes Absolute, Automated 0.07 0.00 - 0.50 x10*3/uL    Lymphocytes Absolute 1.13 0.80 - 3.00 x10*3/uL    Monocytes Absolute 1.11 (H) 0.05 - 0.80 x10*3/uL    Eosinophils Absolute 0.48 (H) 0.00 - 0.40 x10*3/uL    Basophils Absolute 0.06 0.00 - 0.10 x10*3/uL   Comprehensive metabolic panel   Result Value Ref Range    Glucose 106 (H) 65 - 99 mg/dL    Sodium 140 133 - 145 mmol/L    Potassium 3.9 3.4 - 5.1 mmol/L    Chloride 97 97 - 107 mmol/L    Bicarbonate 27 24 - 31 mmol/L    Urea Nitrogen 26 (H) 8 - 25 mg/dL    Creatinine 1.10 0.40 - 1.60 mg/dL    eGFR 72 >60 mL/min/1.73m*2    Calcium 9.4 8.5 - 10.4 mg/dL    Albumin 3.8 3.5 - 5.0 g/dL    Alkaline Phosphatase 113 35 - 125 U/L    Total Protein 6.6 5.9 - 7.9 g/dL    AST 27 5 - 40 U/L    Bilirubin, Total 0.5 0.1 - 1.2 mg/dL    ALT 26 5 - 40 U/L    Anion Gap 16 <=19 mmol/L   Sars-CoV-2 and Influenza A/B PCR   Result Value Ref Range    Flu A Result Not Detected Not Detected    Flu B Result Not Detected Not Detected    Coronavirus 2019, PCR Not Detected Not Detected   Serial Troponin, Initial ([facility])   Result Value Ref Range    Troponin T, High Sensitivity 30 (HH) <=14 ng/L   Urinalysis with Reflex Culture and Microscopic   Result Value Ref Range    Color, Urine Light-Yellow Light-Yellow, Yellow, Dark-Yellow    Appearance, Urine Clear Clear    Specific Gravity, Urine 1.023 1.005 - 1.035    pH, Urine 5.5 5.0, 5.5, 6.0, 6.5, 7.0, 7.5, 8.0    Protein, Urine 20 (TRACE) NEGATIVE, 10 (TRACE), 20 (TRACE) mg/dL    Glucose, Urine Normal Normal mg/dL    Blood, Urine 0.1 (1+) (A) NEGATIVE    Ketones, Urine NEGATIVE NEGATIVE mg/dL    Bilirubin, Urine NEGATIVE NEGATIVE    Urobilinogen, Urine Normal Normal mg/dL    Nitrite, Urine NEGATIVE NEGATIVE    Leukocyte Esterase, Urine NEGATIVE NEGATIVE   Urinalysis Microscopic   Result Value Ref Range    WBC, Urine 1-5 1-5, NONE /HPF    RBC, Urine 6-10 (A) NONE, 1-2,  3-5 /HPF    Mucus, Urine 1+ Reference range not established. /LPF   Serial Troponin, 2 Hour ([facility])   Result Value Ref Range    Troponin T, High Sensitivity 27 (HH) <=14 ng/L   Protime-INR   Result Value Ref Range    Protime 16.2 (H) 9.3 - 12.7 seconds    INR 1.6 (H) 0.9 - 1.2      ECG 12 lead    Result Date: 3/18/2024  Sinus tachycardia with occasional Premature ventricular complexes Possible Left atrial enlargement Borderline ECG When compared with ECG of 24-DEC-2023 10:07, Premature ventricular complexes are now Present    XR chest 1 view  Result Date: 3/18/2024  AP radiograph of the chest was provided.       CARDIOMEDIASTINAL SILHOUETTE: Cardiomediastinal silhouette is stable in size and configuration.   LUNGS: Low lung volumes with bronchovascular crowding. Hilar fullness with bilateral nonspecific interstitial opacities. No sizable pneumothorax. Small pleural effusions not excluded.   ABDOMEN: No remarkable upper abdominal findings.   BONES: No acute osseous changes. Findings suggestive of pulmonary vascular congestion with bibasilar atelectasis. Small pleural effusions or airspace opacities not excluded.           MR lumbar spine wo IV contrast  Result Date: 2/28/2024  Alignment: Mild levoscoliosis and and reversal of the lumbar lordosis.   Vertebrae/Intervertebral Discs: Severe multilevel loss of disc height throughout the lumbar spine, secondary to multiple superior endplate compression deformities, most prominent at L4, with greater than 70% loss of disc height. No increased T2/stir signal to suggest recent fractures. The marrow signal is within normal limits. Multilevel intervertebral body disc irregularities, throughout the lumbar spine.   Conus: The lower thoracic cord appears unremarkable. The conus terminates at L2.   Advanced multilevel degenerative change of the lumbar spine including endplate remodeling, vacuum phenomenon, Schmorl's nodes, ligamentum flavum hypertrophy and facet degenerative  changes.   T11-T12: Small circumferential disc bulge, ligamentum flavum hypertrophy facet degenerative changes and 6 x 5 mm right medially directed synovial cyst (series 801, image 1) with moderate canal stenosis. Neural foramina are patent.   T12-L1: Disc bulge asymmetric to the right, ligamentum flavum hypertrophy and facet degenerative changes with moderate canal stenosis. Moderate bilateral neural foraminal narrowing.   L1-L2: Circumferential disc bulge, ligamentum flavum hypertrophy and facet degenerative change with severe canal stenosis. Mild left subarticular recess narrowing. Moderate bilateral neural foraminal narrowing.   L2-3: Circumferential disc bulge, ligamentum flavum hypertrophy and facet degenerative change with severe canal stenosis. Neural foramina are patent.   L3-4: Disc bulge slightly asymmetric to the left, ligamentum flavum hypertrophy and facet degenerative changes with moderate canal stenosis. Right-greater-than-left subarticular recess narrowing. Mild bilateral neural foraminal narrowing.   L4-5: Small circumferential disc bulge, facet degenerative changes and mild ligamentum flavum hypertrophy without significant canal stenosis. Right-greater-than-left moderate neural foraminal narrowing.   L5-S1: Circumferential disc bulge, facet degenerative changes and ligamentum flavum hypertrophy without canal stenosis. Mild bilateral neural foraminal narrowing.   The prevertebral and posterior paraspinous soft tissues are unremarkable.   1.  Severe multilevel compression deformities throughout the lumbar spine, most prominent at L4 with greater than 70% loss of disc height. 2. Advanced multilevel degenerative changes with up to severe canal stenosis and moderate neural foraminal narrowing most prominent at L1-L2 and L2-L3. 3. Multilevel subarticular recess narrowing.         Impression  Acute hypoxic respiratory failure  Acute COPD exacerbation  Acute on chronic congestive heart failure  Paroxysmal  atrial fibrillation  Chronic kidney disease stage II    Plan  Wean oxygen as sats allow  Continue IBD/ICS  Continuous pulse oximetry  Incentive spirometry/pulmonary hygiene  Prednisone  Oral Lasix  FEV1 when optimized  Coumadin  GI prophylaxis  PT/OT/out of bed  Reviewed with collaborating physician Dr. Kilgore  Thank you for this consultation    Gita Redmond APRN-CNP  [facility]

## 2024-03-18 NOTE — PROGRESS NOTES
Reggie Munoz is a 73 y.o. male on day 0 of admission presenting with COPD exacerbation (CMS/[facility]).       03/18/24 3605   Current Planned Discharge Disposition   Current Planned Discharge Disposition Home     Patient lives alone in a two level condo. No use of assistive devices, no issues using the stairs. He is independent with ADLs and daily tasks. He does not drive, states if he can't get a ride from family or friends, he uses [employer]. He no longer uses home O2. POA is scanned into chart, his sister Velia Morrison. PCP is Rajwinder Dawson MD.   ADOD 03/21/2024  Plan is to discharge home with no needs. Sister or friend will transport.     Liset Johnson RN

## 2024-03-18 NOTE — PROGRESS NOTES
Reggie Munoz is a 73 y.o. male on day 0 of admission presenting with COPD exacerbation (CMS/[facility]).       03/18/24 0748   Physical Activity   On average, how many days per week do you engage in moderate to strenuous exercise (like a brisk walk)? 0 days   On average, how many minutes do you engage in exercise at this level? 0 min   Financial Resource Strain   How hard is it for you to pay for the very basics like food, housing, medical care, and heating? Not very   Housing Stability   In the last 12 months, was there a time when you were not able to pay the mortgage or rent on time? N   In the last 12 months, how many places have you lived? 1   In the last 12 months, was there a time when you did not have a steady place to sleep or slept in a shelter (including now)? N   Transportation Needs   In the past 12 months, has lack of transportation kept you from medical appointments or from getting medications? no   In the past 12 months, has lack of transportation kept you from meetings, work, or from getting things needed for daily living? No   Food Insecurity   Within the past 12 months, you worried that your food would run out before you got the money to buy more. Never true   Within the past 12 months, the food you bought just didn't last and you didn't have money to get more. Never true   Stress   Do you feel stress - tense, restless, nervous, or anxious, or unable to sleep at night because your mind is troubled all the time - these days? Not at all   Social Connections   In a typical week, how many times do you talk on the phone with family, friends, or neighbors? More than 3   How often do you get together with friends or relatives? Three times   How often do you attend Yarsani or Rastafarian services? More than 4   Do you belong to any clubs or organizations such as Yarsani groups, unions, fraternal or athletic groups, or school groups? No   How often do you attend meetings of the clubs or organizations you belong  to? Never   Are you , , , , never , or living with a partner? Never marrie   Intimate Partner Violence   Within the last year, have you been afraid of your partner or ex-partner? No   Within the last year, have you been humiliated or emotionally abused in other ways by your partner or ex-partner? No   Within the last year, have you been kicked, hit, slapped, or otherwise physically hurt by your partner or ex-partner? No   Within the last year, have you been raped or forced to have any kind of sexual activity by your partner or ex-partner? No   Alcohol Use   Q1: How often do you have a drink containing alcohol? Never   Q2: How many drinks containing alcohol do you have on a typical day when you are drinking? None   Q3: How often do you have six or more drinks on one occasion? Never   Utilities   In the past 12 months has the electric, gas, oil, or water company threatened to shut off services in your home? No         Liset Johnson RN

## 2024-03-18 NOTE — PROGRESS NOTES
Physical Therapy    Physical Therapy Evaluation & Treatment    Patient Name: Reggie Munoz  MRN: [id number]  Today's Date: 3/18/2024   Time Calculation  Start Time: 1046  Stop Time: 1106  Time Calculation (min): 20 min    Assessment/Plan   PT Assessment  PT Assessment Results: Decreased strength, Decreased endurance, Impaired balance, Decreased mobility, Impaired judgement, Decreased safety awareness  Rehab Prognosis: Good  Evaluation/Treatment Tolerance: Patient tolerated treatment well  Medical Staff Made Aware: Yes  Strengths: Ability to acquire knowledge, Premorbid level of function  Barriers to Participation: Comorbidities  End of Session Communication: Bedside nurse  Assessment Comment: Pt demonstrates impaired functional mobility from baseline level; pt with mild balance/BLE strength deficits and overall decreased activity tolerance; pt would benefit from continued skilled PT services during hospital stay.  End of Session Patient Position: Up in bathroom (RN aware; chair alarm activated in chair for when pt returns to sitting)   IP OR SWING BED PT PLAN  Inpatient or Swing Bed: Inpatient  PT Plan  Treatment/Interventions: Bed mobility, Transfer training, Gait training, Stair training, Balance training, Neuromuscular re-education, Strengthening, Endurance training, Therapeutic exercise, Therapeutic activity, Home exercise program  PT Plan: Skilled PT  PT Frequency: 4 times per week  PT Discharge Recommendations: Low intensity level of continued care  Equipment Recommended upon Discharge: Wheeled walker  PT Recommended Transfer Status: Contact guard  PT - OK to Discharge: Yes    Subjective     General Visit Information:  General  Reason for Referral: impaired functional mobility (Pt is a 73 year-old M admitted from home with a COPD exacerbation.)  Referred By: Dr. Viraj MD  Past Medical History Relevant to Rehab: HTN, Afib, COPD, CHF, macular degeneration, HLD, JACK, osteopenia, prostate CA.  Family/Caregiver  Present: No  Prior to Session Communication: Bedside nurse  Patient Position Received: Bed, 2 rail up, Alarm off, not on at start of session  Preferred Learning Style: verbal  General Comment: Pt cleared for therapy via RN, received in sitting on EOB, NAD, agreeable to participate in therapy. SpO2 not in nose, per RN pt should be on 3L O2 via NC; O2 donned, SpO2 91% at rest.  Home Living:  Home Living  Type of Home: Condo  Lives With: Alone  Home Adaptive Equipment: None  Home Layout: Two level, Stairs to alternate level with rails  Alternate Level Stairs-Rails: Right  Alternate Level Stairs-Number of Steps: 12  Home Access: Level entry  Bathroom Shower/Tub: Tub/shower unit  Bathroom Toilet: Standard  Bathroom Equipment: None  Prior Level of Function:  Prior Function Per Pt/Caregiver Report  Level of [place]: Independent with ADLs and functional transfers, Independent with homemaking with ambulation  Receives Help From: Friends  ADL Assistance: Independent  Homemaking Assistance: Independent (assist prn with cleaning)  Ambulatory Assistance: Independent  Vocational: Retired  Prior Function Comments: h/o one fall  Precautions:  Precautions  Hearing/Visual Limitations: glasses  Medical Precautions: Fall precautions, Oxygen therapy device and L/min (3L O2 via NC)  Vital Signs:  Vital Signs  SpO2: 91 % (88% post-ambulation training; pursed lip breathing encouraged, able to recover to 92% after ~3 minutes)    Objective   Pain:  Pain Assessment  Pain Assessment: 0-10  Pain Score: 0 - No pain  Cognition:  Cognition  Overall Cognitive Status: Within Functional Limits  Insight: Mild  Impulsive: Mildly    General Assessments:  General Observation  General Observation: pleasant/cooperative; cueing for sequencing/safety throughout     Activity Tolerance  Endurance: Tolerates 10 - 20 min exercise with multiple rests    Sensation  Light Touch: No apparent deficits  Sensation Comment: pt denies  paresthesias    Strength  Strength Comments: BLE > 4-/5  Strength  Strength Comments: BLE > 4-/5    Coordination  Movements are Fluid and Coordinated: Yes    Postural Control  Postural Control: Impaired  Posture Comment: significantly increased thoracic kyphosis, forward head    Static Sitting Balance  Static Sitting-Balance Support: Feet supported  Static Sitting-Level of Assistance: Independent    Static Standing Balance  Static Standing-Balance Support: No upper extremity supported  Static Standing-Level of Assistance: Contact guard  Functional Assessments:  ADL  ADL's Addressed: Yes  ADL Comments: pt requesting to use bathroom; ambulated 10' to bathroom with no AD, contact guard assist to steady and O2 tank managed via PT; stand > sit on toilet with supervision for safety. Instructed in use of pull cord when toileting completed with good agreement verbalized. RN aware.    Bed Mobility  Bed Mobility: No    Transfers  Transfer: Yes  Transfer 1  Transfer From 1: Sit to  Transfer to 1: Stand  Technique 1: Sit to stand  Transfer Level of Assistance 1: Contact guard  Trials/Comments 1: assist to steady  Transfers 2  Transfer From 2: Stand to  Transfer to 2: Sit  Technique 2: Stand to sit  Transfer Level of Assistance 2: Contact guard  Trials/Comments 2: cueing for safe eccentric lowering into chair    Ambulation/Gait Training  Ambulation/Gait Training Performed: Yes  Ambulation/Gait Training 1  Surface 1: Level tile  Device 1: No device  Assistance 1: Contact guard  Quality of Gait 1: Narrow base of support, Forward flexed posture, Decreased step length (reciprocating pattern, mildly unsteady with rapid ariella)  Comments/Distance (ft) 1: 50' x 2    Stairs  Stairs: No     Extremity/Trunk Assessments:  RLE   RLE : Within Functional Limits  LLE   LLE : Within Functional Limits  Treatments:  Ambulation/Gait Training  Ambulation/Gait Training Performed: Yes  Ambulation/Gait Training 1  Surface 1: Level tile  Device 1: No  device  Assistance 1: Contact guard  Quality of Gait 1: Narrow base of support, Forward flexed posture, Decreased step length (reciprocating pattern, mildly unsteady with rapid ariella)  Comments/Distance (ft) 1: 50' x 2  Outcome Measures:  [facility] Basic Mobility  Turning from your back to your side while in a flat bed without using bedrails: None  Moving from lying on your back to sitting on the side of a flat bed without using bedrails: None  Moving to and from bed to chair (including a wheelchair): A little  Standing up from a chair using your arms (e.g. wheelchair or bedside chair): A little  To walk in hospital room: A little  Climbing 3-5 steps with railing: A lot  Basic Mobility - Total Score: 19    Encounter Problems       Encounter Problems (Active)       Mobility       STG - Patient will ambulate 100' with rolling walker and modified independence. (Progressing)       Start:  03/18/24    Expected End:  04/01/24            STG - Patient will ascend and descend a flight of stairs with use of one handrail and modified independence. (Not Progressing)       Start:  03/18/24    Expected End:  04/01/24               Safety       STG - Patient uses call light consistently to request assistance with transfers (Progressing)       Start:  03/18/24    Expected End:  04/01/24                   Education Documentation  Mobility Training, taught by Mihaela Olsen PT at 3/18/2024 11:39 AM.  Learner: Patient  Readiness: Acceptance  Method: Explanation, Demonstration  Response: Needs Reinforcement    Education Comments  No comments found.

## 2024-03-18 NOTE — PROGRESS NOTES
Reggie Munoz is a 73 y.o. male on day 0 of admission presenting with COPD exacerbation (CMS/[facility]).       03/18/24 7633   ACS Disability Status   Are you deaf or do you have serious difficulty hearing? N   Are you blind or do you have serious difficulty seeing, even when wearing glasses? N   Because of a physical, mental, or emotional condition, do you have serious difficulty concentrating, remembering, or making decisions? (5 years old or older) N   Do you have serious difficulty walking or climbing stairs? N   Do you have serious difficulty dressing or bathing? N   Because of a physical, mental, or emotional condition, do you have serious difficulty doing errands alone such as visiting the doctor? N         Liset Johnson RN

## 2024-03-18 NOTE — PROGRESS NOTES
Pt care accepted from Dr Busch with results of the diagnostic labs pending and admission for COPD exacerbation pending.      The patient is a 73-year-old male presenting to the emergency department for evaluation of increasing shortness of breath and dyspnea exertion.  He reports a nonproductive cough and increasing shortness of breath over the past several days.  He states he does have COPD and does have some chronic shortness of breath.  He denies any fever or chills.  No chest pain.  No headache or visual changes.  No focal weakness or numbness.  No abdominal pain.  No nausea or vomiting.  No diarrhea or constipation.  No urinary complaints.  All pertinent positives and negatives are recorded above.  All other systems reviewed and otherwise negative.  Vital signs with hypertension and borderline hypoxia but otherwise within normal limits.  Physical exam with a well-nourished well-developed male with disheveled appearance but no evidence of acute distress at this time.  HEENT exam with dry mucous membranes but otherwise unremarkable.  Abdominal exam is benign.  He has no gross motor, neurologic or vascular deficits on exam.      EKG with sinus tachycardia 106 bpm, occasional PVCs, normal axis, normal voltage, normal ST segment, normal T waves      IV Decadron, DuoNeb and IV Solu-Medrol ordered.      Diagnostic labs with leukocytosis and mildly elevated troponin T but otherwise unremarkable.      Initial Troponin T 30. Repeat trop T pending at the time of admission      COVID-19 testing negative      XR chest 1 view   Final Result   1.  Findings suggestive of pulmonary vascular congestion with   bibasilar atelectasis. Small pleural effusions or airspace opacities   not excluded.                  MACRO:   None        Signed by: Tiffanie Gonzales 3/18/2024 5:58 AM   Dictation workstation:   [id number]           The patient does have signs and symptoms most consistent with COPD exacerbation.  There is no evidence of  obvious pneumonia on chest x-ray.  There is some suggestion of pulmonary vascular congestion with bilateral atelectasis on chest x-ray.  Will treat for possible pneumonia given the atelectasis concerning for possible airspace opacifications      The patient was admitted for further management of his COPD exacerbation.    Impression/diagnosis  COPD exacerbation  Dyspnea, dyspnea on exertion  hypertension, unspecified      I independently reviewed the results of the EKG and diagnostic labs      I reviewed the results of the diagnostic labs and diagnostic imaging.  Formal radiology reading was completed by the radiologist    Neisha Noel MD

## 2024-03-18 NOTE — ED PROVIDER NOTES
HPI   Chief Complaint   Patient presents with    Cough    Shortness of Breath       Patient presents to the emergency department today with complaints of having cough and cold symptoms.  The patient states that he has a previous history of having COPD, and states that he did not use his nebulizer today, but did use his inhalers.  The patient states that he had no nausea or vomiting.  The patient states that he has been having some difficulty sleeping throughout the night, and seem to get steadily worse.  Patient states that he is currently not on any steroids.  Patient states that he has not been admitted recently.  The patient states that he has not traveled outside the country recently as well.  Patient states that he is compliant with his medications.  The patient states that he has a history of having irregular heartbeats as well.  Patient denies fevers or chills.  Patient denies any exposures to anyone else has been sick recently.      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 degeneration, right eye, early dry stage     Osteopenia     Other secondary cataract, bilateral     Personal history of other diseases of the circulatory system     History of angina    Personal history of other diseases of the circulatory system     History of hypertension    Personal history of other diseases of the respiratory system     History of chronic obstructive lung disease    Pseudophakia     Refractive error     Unspecified asthma with (acute) exacerbation     Asthma with acute exacerbation, unspecified asthma severity, unspecified whether persistent     Past Surgical History:   Procedure Laterality Date    CATARACT EXTRACTION      IR ANGIOGRAM PULMONARY Bilateral 04/10/2012    IR ANGIOGRAM PULMONARY [facility]    OTHER  SURGICAL HISTORY  07/23/2021    No history of surgery     Family History   Problem Relation Name Age of Onset    Stroke Mother      Lung cancer Father      Stroke Sister       Social History     Tobacco Use    Smoking status: Never     Passive exposure: Never    Smokeless tobacco: Never   Vaping Use    Vaping Use: Never used   Substance Use Topics    Alcohol use: Not Currently    Drug use: Never       Physical Exam   ED Triage Vitals [03/18/24 0438]   Temperature Heart Rate Respirations BP   36.4 °C (97.6 °F) (!) 114 (!) 24 160/82      Pulse Ox Temp Source Heart Rate Source Patient Position   (!) 92 % Axillary -- --      BP Location FiO2 (%)     -- --       Physical Exam  Vitals and nursing note reviewed.   Constitutional:       General: He is not in acute distress.     Appearance: He is well-developed.   HENT:      Head: Normocephalic and atraumatic.   Eyes:      Conjunctiva/sclera: Conjunctivae normal.   Cardiovascular:      Rate and Rhythm: Regular rhythm. Tachycardia present.      Heart sounds: No murmur heard.  Pulmonary:      Effort: Pulmonary effort is normal. No respiratory distress.      Breath sounds: Decreased breath sounds and wheezing (Bilateral expiratory) present.   Abdominal:      Palpations: Abdomen is soft.      Tenderness: There is no abdominal tenderness.   Musculoskeletal:         General: No swelling.      Cervical back: Neck supple.   Skin:     General: Skin is warm and dry.      Capillary Refill: Capillary refill takes less than 2 seconds.   Neurological:      Mental Status: He is alert.         ED Course & MDM   ED Course as of 03/18/24 0601   Mon Mar 18, 2024   0516 EKG: Normal sinus rhythm, normal QRS, occasional PAC.  No ST abnormalities.  No STEMI.  Interpreted by me. [FR]   0537 Patient has improved after receiving the first treatment.  Blood work is still pending at the present time.  Patient's chest x-ray did not show any signs of pneumonia. [FR]      ED Course User Index  [FR]  Fahad Busch DO         Diagnoses as of 03/18/24 0601   COPD exacerbation (CMS/[facility])       Medical Decision Making  After initial evaluation, the patient will need to be given steroids, breathing treatments, and I will obtain chest x-ray and blood work as well.  Patient's O2 saturation is low.  The patient does wear oxygen at home.    Amount and/or Complexity of Data Reviewed  Labs:  Decision-making details documented in ED Course.  Radiology:  Decision-making details documented in ED Course.  ECG/medicine tests:  Decision-making details documented in ED Course.        Procedure  Procedures     Fahad Busch DO  03/18/24 0601

## 2024-03-18 NOTE — H&P
History Of Present Illness  Reggie Munoz is a 73 y.o. male hx of COPD, paroxsymal atrial fibrillation on warfarin, HTN, chronic diastolic heart failure, presenting with worsening shortness of breath over the last few weeks. Patient is a poor historian. Reports some coughing but is not sure if it has been productive. Also endorsing increased wheezing as well. Denies fever, chills, chest pain, abdominal pain, and leg swelling. States he thinks he's supposed to be on oxygen at home but is not using any. Denies tobacco use, alcohol use, and illicit substance use.     Discussed code status, states that he would like to be FULL CODE.     Patient unable to provide list of home medications/complete med rec.     In the ED, vitals showing intermittent tachycardia and tachypnea. SpO2 94% on 3L NC. Labs remarkable for WBC 15.2, Cr 1.10. troponin 30. COVID/influenza negative. UA unremarkable. CXR showing pulmonary vascular congestion with bibasilar atelectasis. Given decadron 10mg x1 aspirin 325mg x1, duoneb, azithromycin x1, and 500cc NS bolus.      Past Medical History  He has a past medical history of A-fib (CMS/[facility]), COPD (chronic obstructive pulmonary disease) (CMS/[facility]), Hyperlipidemia, Hypertension, Nonexudative age-related macular degeneration, right eye, early dry stage, Osteopenia, Other secondary cataract, bilateral, Personal history of other diseases of the circulatory system, Personal history of other diseases of the circulatory system, Personal history of other diseases of the respiratory system, Pseudophakia, Refractive error, and Unspecified asthma with (acute) exacerbation.    Surgical History  He has a past surgical history that includes Other surgical history (07/23/2021); IR angiogram pulmonary (Bilateral, 04/10/2012); and Cataract extraction.     Social History  He reports that he has never smoked. He has never been exposed to tobacco smoke. He has never used smokeless tobacco. He reports that he does  not currently use alcohol. He reports that he does not use drugs.    Family History  Family History   Problem Relation Name Age of Onset    Stroke Mother      Lung cancer Father      Stroke Sister          Allergies  Patient has no known allergies.    Review of Systems   Constitutional:  Positive for fatigue. Negative for chills.   HENT:  Negative for trouble swallowing.    Respiratory:  Positive for shortness of breath and wheezing.    Cardiovascular:  Negative for chest pain and leg swelling.   Gastrointestinal:  Negative for abdominal distention, nausea and vomiting.   Genitourinary:  Negative for difficulty urinating.   Skin:  Negative for rash.   Neurological:  Negative for headaches.        Physical Exam  Constitutional:       General: He is not in acute distress.     Appearance: He is not toxic-appearing.      Comments: Tired appearing   HENT:      Head: Normocephalic and atraumatic.      Mouth/Throat:      Pharynx: Oropharynx is clear.   Eyes:      General: No scleral icterus.  Cardiovascular:      Rate and Rhythm: Normal rate and regular rhythm.   Pulmonary:      Effort: No respiratory distress.      Breath sounds: Wheezing (expiratory wheezing bilaterally) present.   Abdominal:      General: There is no distension.      Palpations: Abdomen is soft.   Musculoskeletal:      Right lower leg: No edema.      Left lower leg: No edema.   Neurological:      Mental Status: He is oriented to person, place, and time.          Last Recorded Vitals  /80   Pulse 93   Temp 36.4 °C (97.6 °F) (Axillary)   Resp (!) 22   Wt 80.7 kg (178 lb)   SpO2 (!) 93%     Relevant Results           Assessment/Plan   Principal Problem:    COPD exacerbation (CMS/[facility])      Acute hypoxic respiratory failure 2/2 COPD exacerbation  Consult pulmonology  COVID/influenza negative  Defer antibiotics to pulmonology  Prednisone 20mg BID  Aerosols PRN  Wean O2 as able    Leukocytosis  Likely infectious vs steroid induced  Dispense report  showing active script for prednisone 10mg daily per Dr. Kilgore  Unclear if patient is actually taking steroids daily    Paroxsymal atrial fibrillation  Continue home digoxin and diltiazem  Continue warfarin  PT/INR daily    Chronic diastolic heart failure  Continue home lasix    CKD2  Baseline Cr 1.0-1.2  Currently stable at baseline    FULL CODE  POA: Velia Morrison, sister [phone]    Dispo: pending PT/OT eval/recommendations       Eladia Cali MD

## 2024-03-18 NOTE — CARE PLAN
Problem: Respiratory  Goal: Wean oxygen to maintain O2 saturation per order/standard this shift  Outcome: Not Progressing     Problem: Respiratory  Goal: No signs of respiratory distress (eg. Use of accessory muscles. Peds grunting)  Outcome: Met     Problem: Respiratory  Goal: Wean oxygen to maintain O2 saturation per order/standard this shift  Outcome: Not Progressing      Acitretin Counseling:  I discussed with the patient the risks of acitretin including but not limited to hair loss, dry lips/skin/eyes, liver damage, hyperlipidemia, depression/suicidal ideation, photosensitivity.  Serious rare side effects can include but are not limited to pancreatitis, pseudotumor cerebri, bony changes, clot formation/stroke/heart attack.  Patient understands that alcohol is contraindicated since it can result in liver toxicity and significantly prolong the elimination of the drug by many years.

## 2024-03-18 NOTE — PROGRESS NOTES
Occupational Therapy    Evaluation/Treatment    Patient Name: Reggie Munoz  MRN: [id number]  : 1950  Today's Date: 24  Time Calculation  Start Time: 1349  Stop Time: 1410  Time Calculation (min): 21 min       Assessment:  OT Assessment: 73 year old male who comes to the ED for increased SOB, wheezing and cough. Patient is currently on 3 liters of O2, hypoxic this date with minimal activities this date. Patient is limited with multiple comorbidities, is a fall risk, is functioning below his baseline, will benefit from continued OT intervention and moderate instensity rehab is recommended on discharge.  Prognosis: Good  Barriers to Discharge: Decreased caregiver support  Evaluation/Treatment Tolerance: Patient limited by fatigue  Medical Staff Made Aware: Yes  End of Session Communication: Bedside nurse  End of Session Patient Position: Bed, 3 rail up, Alarm on  OT Assessment Results: Decreased ADL status, Decreased upper extremity strength, Decreased safe judgment during ADL, Decreased endurance, Decreased cognition, Decreased functional mobility, Decreased IADLs  Prognosis: Good  Barriers to Discharge: Decreased caregiver support  Evaluation/Treatment Tolerance: Patient limited by fatigue  Medical Staff Made Aware: Yes  Strengths: Premorbid level of function  Barriers to Participation: Comorbidities  Plan:  Treatment Interventions: ADL retraining, Functional transfer training, UE strengthening/ROM, Endurance training, Patient/family training, Cognitive reorientation, Equipment evaluation/education, Compensatory technique education  OT Frequency: 4 times per week  OT Discharge Recommendations: Moderate intensity level of continued care  Equipment Recommended upon Discharge: Wheeled walker  OT Recommended Transfer Status: Assist of 1  OT - OK to Discharge: Yes  Treatment Interventions: ADL retraining, Functional transfer training, UE strengthening/ROM, Endurance training, Patient/family training,  Cognitive reorientation, Equipment evaluation/education, Compensatory technique education    Subjective   Current Problem:  1. COPD exacerbation (CMS/HCC)        2. Congestive heart failure, unspecified HF chronicity, unspecified heart failure type (CMS/HCC)          General:   OT Received On: 03/18/24  General  Reason for Referral: Decreased ADL function due to COPD exacerbation.  Referred By: Dr. Viraj MD  Past Medical History Relevant to Rehab: HTN, Afib, COPD, CHF, macular degeneration, HLD, JACK, osteopenia, prostate CA.  Family/Caregiver Present: No  Prior to Session Communication: Bedside nurse  Patient Position Received: Up in chair  Preferred Learning Style: verbal  General Comment: Cleared to see patient for OT , patient agreeable to therapy. Patient met in the room in the chair, jut completed his breathing treatment.  Precautions:  Hearing/Visual Limitations: glasses  Medical Precautions: Fall precautions, Oxygen therapy device and L/min (3 liters of O2)  Precautions Comment: Patient recommended bed/chair alarm for safety.  Vital Signs:  SpO2: 97 % (90-91 % with activity this date, patient with SOB, wheezing noted this date.)  Patient Position: Sitting    Objective   Cognition:  Overall Cognitive Status: Within Functional Limits  Attention: Within Functional Limits  Problem Solving: Exceptions to WFL  Complex Functional Tasks: Impaired  Safety/Judgement: Exceptions to WFL  Complex Functional Tasks: Minimal  Insight: Mild  Impulsive: Mildly    Home Living:  Type of Home: Condo  Lives With: Alone  Home Adaptive Equipment: None  Home Layout: Two level, Stairs to alternate level with rails  Alternate Level Stairs-Rails: Right  Alternate Level Stairs-Number of Steps: 12  Home Access: Level entry  Bathroom Shower/Tub: Tub/shower unit  Bathroom Toilet: Standard  Bathroom Equipment: None  Bathroom Accessibility: Patient reports full bath on the second floor.  Prior Function:  Level of [place]: Independent  with ADLs and functional transfers, Independent with homemaking with ambulation  Receives Help From: Friends, Family  ADL Assistance: Independent  Homemaking Assistance: Independent  Ambulatory Assistance: Independent  Vocational: Retired  Hand Dominance: Right  Prior Function Comments: h/o one fall  IADL History:  Homemaking Responsibilities: Yes  Meal Prep Responsibility: Primary  Laundry Responsibility: Primary  Cleaning Responsibility: Primary  Bill Paying/Finance Responsibility: Primary  Shopping Responsibility: Primary  Current License: No  Mode of Transportation: Family, Friends, Ride share  ADL:  Eating Assistance: Independent  Eating Deficit: Setup  Grooming Assistance: Stand by  Grooming Deficit: Setup, Supervision/safety, Wash/dry hands, Wash/dry face, Standing with assistive device (3 liters of O2, walker level)  Bathing Assistance: Not performed  UE Dressing Assistance: Minimal  LE Dressing Assistance: Stand by  LE Dressing Deficit: Setup, Don/doff R sock, Don/doff L sock, Don/doff R shoe, Don/doff L shoe, Supervision/safety  Toileting Assistance with Device:  (Patient reports using the urinal)  Functional Assistance:  (CGA/Close Supervision)  Activities of Daily Living:      Grooming  Grooming Level of Assistance: Close supervision, Setup (increased time)  Grooming Where Assessed: Standing sinkside  Grooming Comments: Patient on 3 liters of O2, wheezing, hypoxic with Spo2 89%.    LE Dressing  LE Dressing: Yes  Sock Level of Assistance: Setup, Close supervision  Shoe Level of Assistance: Setup, Close supervision  LE Dressing Where Assessed: Chair       Activity Tolerance:  Endurance: Tolerates less than 10 min exercise with changes in vital signs  Activity Tolerance Comments: Poor this date, limited with SOB, wheeziness, hypoxia on 3 liters of O2.  Functional Standing Tolerance:  Time: 2-3min  Activity: ADL and functional mobility tasks.  Functional Standing Tolerance Comments: CGA/Close Supervision,  limited with severe kyphosis, wheezing, hypoxia on 3 ltiers of O2.  Bed Mobility/Transfers: Bed Mobility  Bed Mobility: Yes (Patient was Close Supervision this date to lay in the bed this date, HOB raised.)    Transfers  Transfer: Yes (Patient was Close Supervision to complete chair and bed tansfers this date.)    Functional Mobility:  Functional Mobility  Functional Mobility Performed: Yes (Patient was CGA/Close Supervision to walk in the room and outside the room this date without AD, limited with hypoxia, wheezing, very kypotic, is a fall risk, will benefit from AD.)  Sitting Balance:  Static Sitting Balance  Static Sitting-Comment/Number of Minutes: Good, 3-4 min  Dynamic Sitting Balance  Dynamic Sitting-Comments: Good, 3-4 min  Standing Balance:  Static Standing Balance  Static Standing-Comment/Number of Minutes: Fair +/G, 2-3min  Dynamic Standing Balance  Dynamic Standing-Comments: F+/G 2-3 min       Therapy/Activity: Therapeutic Activity  Therapeutic Activity Performed: Yes (Functional sitting andstanding tasks this date.)     Vision:Vision - Basic Assessment  Current Vision: Does not wear glasses (macular degeneration)  Sensation:  Sensation Comment: pt denies paresthesias  Strength:  Strength Comments: 3+/5 overall BUE.    Coordination:  Movements are Fluid and Coordinated: Yes (BUE)   Hand Function:  Hand Function  Gross Grasp: Functional  Coordination: Functional      Outcome Measures: [facility] Daily Activity  Putting on and taking off regular lower body clothing: A little  Bathing (including washing, rinsing, drying): A little  Putting on and taking off regular upper body clothing: A little  Toileting, which includes using toilet, bedpan or urinal: A little  Taking care of personal grooming such as brushing teeth: A little  Eating Meals: A little  Daily Activity - Total Score: 18        Education Documentation  ADL Training, taught by Maria Teresa Downing OT at 3/18/2024  2:40 PM.  Learner: Patient  Readiness:  Acceptance  Method: Explanation, Demonstration  Response: Demonstrated Understanding, Needs Reinforcement, Verbalizes Understanding  Comment: Patient was instructed in safe functional transfers/mobility , energy conservation techniques this date.    Education Comments  No comments found.        OP EDUCATION:       Goals:  Encounter Problems       Encounter Problems (Active)       OT Goals       Patient will be able to complete all UB/LB ADL tasks of bathing, dressing, toileting and grooming with modified independence. (Progressing)       Start:  03/18/24    Expected End:  04/01/24            Patient will be able to complete all functional transfers/mobility with least restrictive device with modified independence using good safety and with G balance.  (Progressing)       Start:  03/18/24    Expected End:  04/01/24            Patient will be able to tolerate 10-15 min of functional standing with G balance in prep for ADL/transfers (Progressing)       Start:  03/18/24    Expected End:  04/01/24

## 2024-03-18 NOTE — PROGRESS NOTES
Reggie Munoz is a 73 y.o. male on day 0 of admission presenting with COPD exacerbation (CMS/[facility]).       03/18/24 0752   Discharge Planning   Living Arrangements Alone   Support Systems Family members;Friends/neighbors   Assistance Needed none   Type of Residence Private residence   Number of Stairs to Enter Residence 0   Number of Stairs Within Residence 1  (one flight to upper level)   Do you have animals or pets at home? No   Who is requesting discharge planning? Provider   Home or Post Acute Services None   Patient expects to be discharged to: home   Does the patient need discharge transport arranged? No   Patient Choice   Provider Choice list and CMS website (https://medicare.gov/care-compare#search) for post-acute Quality and Resource Measure Data were provided and reviewed with: Other (Comment)  (N/A - dc home with no needs)   Patient / Family choosing to utilize agency / facility established prior to hospitalization No         Liset Johnson RN

## 2024-03-18 NOTE — NURSING NOTE
Pt arrived to room 429B from ED in stable condition here for COPD exacerbation. Pt Alert and oriented x3 oriented to new environment, call bell and phone use. LHS folder, menu and water given. 3L NC O2 in place HOB elevated. Bed alarm engaged. Will continue to monitor pt further.

## 2024-03-19 LAB
ANION GAP SERPL CALC-SCNC: 11 MMOL/L
BUN SERPL-MCNC: 29 MG/DL (ref 8–25)
CALCIUM SERPL-MCNC: 8.7 MG/DL (ref 8.5–10.4)
CHLORIDE SERPL-SCNC: 98 MMOL/L (ref 97–107)
CO2 SERPL-SCNC: 31 MMOL/L (ref 24–31)
CREAT SERPL-MCNC: 1.1 MG/DL (ref 0.4–1.6)
EGFRCR SERPLBLD CKD-EPI 2021: 72 ML/MIN/1.73M*2
ERYTHROCYTE [DISTWIDTH] IN BLOOD BY AUTOMATED COUNT: 13.4 % (ref 11.5–14.5)
GLUCOSE SERPL-MCNC: 149 MG/DL (ref 65–99)
HCT VFR BLD AUTO: 43.7 % (ref 41–52)
HGB BLD-MCNC: 13.7 G/DL (ref 13.5–17.5)
INR PPP: 2.3 (ref 0.9–1.2)
MCH RBC QN AUTO: 29.1 PG (ref 26–34)
MCHC RBC AUTO-ENTMCNC: 31.4 G/DL (ref 32–36)
MCV RBC AUTO: 93 FL (ref 80–100)
NRBC BLD-RTO: 0 /100 WBCS (ref 0–0)
PLATELET # BLD AUTO: 184 X10*3/UL (ref 150–450)
POTASSIUM SERPL-SCNC: 3.9 MMOL/L (ref 3.4–5.1)
PROTHROMBIN TIME: 22.8 SECONDS (ref 9.3–12.7)
RBC # BLD AUTO: 4.7 X10*6/UL (ref 4.5–5.9)
SODIUM SERPL-SCNC: 140 MMOL/L (ref 133–145)
WBC # BLD AUTO: 12.5 X10*3/UL (ref 4.4–11.3)

## 2024-03-19 PROCEDURE — 36415 COLL VENOUS BLD VENIPUNCTURE: CPT | Performed by: STUDENT IN AN ORGANIZED HEALTH CARE EDUCATION/TRAINING PROGRAM

## 2024-03-19 PROCEDURE — 85027 COMPLETE CBC AUTOMATED: CPT | Performed by: STUDENT IN AN ORGANIZED HEALTH CARE EDUCATION/TRAINING PROGRAM

## 2024-03-19 PROCEDURE — 2500000005 HC RX 250 GENERAL PHARMACY W/O HCPCS: Performed by: STUDENT IN AN ORGANIZED HEALTH CARE EDUCATION/TRAINING PROGRAM

## 2024-03-19 PROCEDURE — 2500000004 HC RX 250 GENERAL PHARMACY W/ HCPCS (ALT 636 FOR OP/ED): Performed by: STUDENT IN AN ORGANIZED HEALTH CARE EDUCATION/TRAINING PROGRAM

## 2024-03-19 PROCEDURE — 94760 N-INVAS EAR/PLS OXIMETRY 1: CPT

## 2024-03-19 PROCEDURE — 94640 AIRWAY INHALATION TREATMENT: CPT

## 2024-03-19 PROCEDURE — 85610 PROTHROMBIN TIME: CPT | Performed by: STUDENT IN AN ORGANIZED HEALTH CARE EDUCATION/TRAINING PROGRAM

## 2024-03-19 PROCEDURE — 1210000001 HC SEMI-PRIVATE ROOM DAILY

## 2024-03-19 PROCEDURE — 2500000002 HC RX 250 W HCPCS SELF ADMINISTERED DRUGS (ALT 637 FOR MEDICARE OP, ALT 636 FOR OP/ED): Performed by: EMERGENCY MEDICINE

## 2024-03-19 PROCEDURE — 2500000001 HC RX 250 WO HCPCS SELF ADMINISTERED DRUGS (ALT 637 FOR MEDICARE OP): Performed by: STUDENT IN AN ORGANIZED HEALTH CARE EDUCATION/TRAINING PROGRAM

## 2024-03-19 PROCEDURE — 80048 BASIC METABOLIC PNL TOTAL CA: CPT | Performed by: STUDENT IN AN ORGANIZED HEALTH CARE EDUCATION/TRAINING PROGRAM

## 2024-03-19 PROCEDURE — 2500000002 HC RX 250 W HCPCS SELF ADMINISTERED DRUGS (ALT 637 FOR MEDICARE OP, ALT 636 FOR OP/ED): Performed by: STUDENT IN AN ORGANIZED HEALTH CARE EDUCATION/TRAINING PROGRAM

## 2024-03-19 PROCEDURE — 9420000001 HC RT PATIENT EDUCATION 5 MIN

## 2024-03-19 RX ORDER — IPRATROPIUM BROMIDE AND ALBUTEROL SULFATE 2.5; .5 MG/3ML; MG/3ML
3 SOLUTION RESPIRATORY (INHALATION)
Status: DISCONTINUED | OUTPATIENT
Start: 2024-03-19 | End: 2024-03-23 | Stop reason: HOSPADM

## 2024-03-19 RX ADMIN — WARFARIN SODIUM 2 MG: 2 TABLET ORAL at 16:43

## 2024-03-19 RX ADMIN — IPRATROPIUM BROMIDE AND ALBUTEROL SULFATE 3 ML: .5; 3 SOLUTION RESPIRATORY (INHALATION) at 19:38

## 2024-03-19 RX ADMIN — IPRATROPIUM BROMIDE AND ALBUTEROL SULFATE 3 ML: 2.5; .5 SOLUTION RESPIRATORY (INHALATION) at 08:48

## 2024-03-19 RX ADMIN — PANTOPRAZOLE SODIUM 40 MG: 40 TABLET, DELAYED RELEASE ORAL at 09:23

## 2024-03-19 RX ADMIN — DILTIAZEM HYDROCHLORIDE 180 MG: 180 CAPSULE, COATED, EXTENDED RELEASE ORAL at 09:20

## 2024-03-19 RX ADMIN — Medication 2000 UNITS: at 09:20

## 2024-03-19 RX ADMIN — Medication: at 15:00

## 2024-03-19 RX ADMIN — DIGOXIN 125 MCG: 125 TABLET ORAL at 09:20

## 2024-03-19 RX ADMIN — PREDNISONE 20 MG: 20 TABLET ORAL at 09:20

## 2024-03-19 RX ADMIN — IPRATROPIUM BROMIDE AND ALBUTEROL SULFATE 3 ML: .5; 3 SOLUTION RESPIRATORY (INHALATION) at 14:40

## 2024-03-19 RX ADMIN — PREDNISONE 20 MG: 20 TABLET ORAL at 16:43

## 2024-03-19 RX ADMIN — Medication 3 MG: at 20:20

## 2024-03-19 RX ADMIN — IPRATROPIUM BROMIDE AND ALBUTEROL SULFATE 3 ML: 2.5; .5 SOLUTION RESPIRATORY (INHALATION) at 01:04

## 2024-03-19 RX ADMIN — Medication: at 23:00

## 2024-03-19 RX ADMIN — ATORVASTATIN CALCIUM 10 MG: 10 TABLET, FILM COATED ORAL at 20:20

## 2024-03-19 RX ADMIN — BUDESONIDE INHALATION 0.5 MG: 0.5 SUSPENSION RESPIRATORY (INHALATION) at 08:48

## 2024-03-19 RX ADMIN — Medication 4 L/MIN: at 09:15

## 2024-03-19 RX ADMIN — FUROSEMIDE 40 MG: 40 TABLET ORAL at 09:20

## 2024-03-19 RX ADMIN — BUDESONIDE INHALATION 0.5 MG: 0.5 SUSPENSION RESPIRATORY (INHALATION) at 19:38

## 2024-03-19 ASSESSMENT — COGNITIVE AND FUNCTIONAL STATUS - GENERAL
DRESSING REGULAR LOWER BODY CLOTHING: A LITTLE
TOILETING: A LITTLE
HELP NEEDED FOR BATHING: A LITTLE
DAILY ACTIVITIY SCORE: 18
MOBILITY SCORE: 20
EATING MEALS: A LITTLE
CLIMB 3 TO 5 STEPS WITH RAILING: A LOT
PERSONAL GROOMING: A LITTLE
DRESSING REGULAR UPPER BODY CLOTHING: A LITTLE
WALKING IN HOSPITAL ROOM: A LOT

## 2024-03-19 ASSESSMENT — PAIN - FUNCTIONAL ASSESSMENT
PAIN_FUNCTIONAL_ASSESSMENT: 0-10
PAIN_FUNCTIONAL_ASSESSMENT: 0-10

## 2024-03-19 ASSESSMENT — PAIN SCALES - GENERAL
PAINLEVEL_OUTOF10: 0 - NO PAIN

## 2024-03-19 NOTE — NURSING NOTE
Assumed care of pt. BSSR completed, pt in bed resting comfortably denies any needs or pain. 3L oxygen via NC in place,HOB elevated. Call light in reach. Bed locked, lowest level. Bed alarm on for safety. Will continue to monitor pt further.

## 2024-03-19 NOTE — PROGRESS NOTES
Reggie Munoz is a 73 y.o. male on day 1 of admission presenting with COPD exacerbation (CMS/HCC).      Subjective   Patient eating breakfast. Says he feels okay. Denies SOB or cough. Doesn't have any questions or complaints.       Objective     Last Recorded Vitals  /77 (BP Location: Right arm, Patient Position: Lying)   Pulse 105   Temp 36.8 °C (98.2 °F) (Oral)   Resp 18   Wt 80.7 kg (177 lb 14.6 oz)   SpO2 92%   Intake/Output last 3 Shifts:    Intake/Output Summary (Last 24 hours) at 3/19/2024 1015  Last data filed at 3/18/2024 1700  Gross per 24 hour   Intake 490 ml   Output --   Net 490 ml       Admission Weight  Weight: 80.7 kg (178 lb) (03/18/24 0438)    Daily Weight  03/18/24 : 80.7 kg (177 lb 14.6 oz)    Image Results  ECG 12 lead  Sinus tachycardia with occasional Premature ventricular complexes  Possible Left atrial enlargement  Borderline ECG  When compared with ECG of 24-DEC-2023 10:07,  Premature ventricular complexes are now Present  Confirmed by Joshua Lopez ([id number]) on 3/18/2024 7:39:25 PM  XR chest 1 view  Narrative: Interpreted By:  Tiffanie Gonzales,   STUDY:  XR CHEST 1 VIEW;  3/18/2024 5:11 am      INDICATION:  Signs/Symptoms:Shortness of breath.      COMPARISON:  12/24/2023      ACCESSION NUMBER(S):  [id number]      ORDERING CLINICIAN:  GARY MAE      FINDINGS:  AP radiograph of the chest was provided.              CARDIOMEDIASTINAL SILHOUETTE:  Cardiomediastinal silhouette is stable in size and configuration.      LUNGS:  Low lung volumes with bronchovascular crowding. Hilar fullness with  bilateral nonspecific interstitial opacities. No sizable  pneumothorax. Small pleural effusions not excluded.      ABDOMEN:  No remarkable upper abdominal findings.      BONES:  No acute osseous changes.      Impression: 1.  Findings suggestive of pulmonary vascular congestion with  bibasilar atelectasis. Small pleural effusions or airspace opacities  not excluded.               MACRO:  None      Signed by: Tiffaniealexia Griffinm 3/18/2024 5:58 AM  Dictation workstation:   [id number]      Physical Exam  General: alert, no diaphoresis   HENT: mucous membranes moist, external ears normal, no rhinorrhea   Eyes: no icterus or injection, no discharge   Lungs: faint expiratory wheeze BL    Heart: RRR, no LE edema BL   GI: abdomen soft, nontender, nondistended, BS present   MSK: no joint effusion or deformity   Skin: no rashes, erythema, or ecchymosis   Neuro: grossly normal cognition, motor strength, sensation      Relevant Results               Assessment/Plan                  Principal Problem:    COPD exacerbation (CMS/[facility])    Acute hypoxic respiratory failure 2/2 COPD exacerbation  Technically with chronic hypoxic respiratory failure; supposed to wear 2L at home but denies wearing it. Likely some underlying compliance issues  Consult pulmonology  COVID/influenza negative  Prednisone 20mg BID  Aerosols PRN  Wean O2 as able-- currently at 4L     Leukocytosis- improving  Likely infectious vs steroid induced  Dispense report showing active script for prednisone 10mg daily per Dr. Kilgore  Unclear if patient is actually taking steroids daily     Paroxsymal atrial fibrillation  Continue home digoxin and diltiazem  Continue warfarin  PT/INR daily     Chronic diastolic heart failure  Continue home lasix     CKD2  Baseline Cr 1.0-1.2  Currently stable at baseline     FULL CODE  POA: Velia Morrison, sister [phone]     Dispo: pending PT/OT eval/recommendations  Would overall benefit from further counseling regarding wearing his home O2, may help keep him out of the hospital.               Molly Najera DO

## 2024-03-19 NOTE — PROGRESS NOTES
"Reggie Munoz is a 73 y.o. male on day 1 of admission seen in follow-up for acute hypoxia, acute COPD exacerbation    Subjective   On 3 L nasal cannula oxygen; O2 sats 92%.  Endorses improved breathing.  Denies pain.  Afebrile.       Objective     Physical Exam  Vitals and nursing note reviewed.   Constitutional:       Appearance: Normal appearance.   HENT:      Head: Normocephalic and atraumatic.      Nose: Nose normal.      Mouth/Throat:      Mouth: Mucous membranes are moist.   Eyes:      Extraocular Movements: Extraocular movements intact.      Conjunctiva/sclera: Conjunctivae normal.      Pupils: Pupils are equal, round, and reactive to light.   Cardiovascular:      Rate and Rhythm: Normal rate and regular rhythm.      Pulses: Normal pulses.      Heart sounds: Normal heart sounds.   Pulmonary:      Effort: Pulmonary effort is normal.      Comments: Lungs diminished but clear.  Abdominal:      General: Bowel sounds are normal.      Palpations: Abdomen is soft.   Musculoskeletal:         General: Normal range of motion.   Skin:     General: Skin is warm and dry.      Capillary Refill: Capillary refill takes less than 2 seconds.   Neurological:      General: No focal deficit present.      Mental Status: He is alert and oriented to person, place, and time.   Psychiatric:         Mood and Affect: Mood normal.         Behavior: Behavior normal.         Last Recorded Vitals  Blood pressure 136/77, pulse 105, temperature 36.8 °C (98.2 °F), temperature source Oral, resp. rate 18, height 1.702 m (5' 7.01\"), weight 80.7 kg (177 lb 14.6 oz), SpO2 92 %.  Intake/Output last 3 Shifts:  I/O last 3 completed shifts:  In: 1313.3 (16.3 mL/kg) [P.O.:730; IV Piggyback:583.3]  Out: - (0 mL/kg)   Weight: 80.7 kg       Intake/Output Summary (Last 24 hours) at 3/19/2024 0955  Last data filed at 3/18/2024 1700  Gross per 24 hour   Intake 730 ml   Output --   Net 730 ml      atorvastatin, 10 mg, oral, Nightly  budesonide, 0.5 mg, " nebulization, BID  cholecalciferol, 2,000 Units, oral, Daily  digoxin, 125 mcg, oral, Daily  dilTIAZem CD, 180 mg, oral, Daily  furosemide, 40 mg, oral, Daily  ipratropium-albuteroL, 3 mL, nebulization, TID  melatonin, 3 mg, oral, Nightly  oxygen, , inhalation, q8h  pantoprazole, 40 mg, oral, Daily   Or  pantoprazole, 40 mg, intravenous, Daily  polyethylene glycol, 17 g, oral, Daily  predniSONE, 20 mg, oral, BID with meals  warfarin, 2 mg, oral, Daily       PRN medications: acetaminophen **OR** acetaminophen **OR** acetaminophen, albuterol, ondansetron ODT **OR** ondansetron       Relevant Results  Results for orders placed or performed during the hospital encounter of 03/18/24 (from the past 24 hour(s))   Lavender Top   Result Value Ref Range    Extra Tube Hold for add-ons.    Serial Troponin, 6 Hour ([facility])   Result Value Ref Range    Troponin T, High Sensitivity 27 (HH) <=14 ng/L   CBC   Result Value Ref Range    WBC 12.5 (H) 4.4 - 11.3 x10*3/uL    nRBC 0.0 0.0 - 0.0 /100 WBCs    RBC 4.70 4.50 - 5.90 x10*6/uL    Hemoglobin 13.7 13.5 - 17.5 g/dL    Hematocrit 43.7 41.0 - 52.0 %    MCV 93 80 - 100 fL    MCH 29.1 26.0 - 34.0 pg    MCHC 31.4 (L) 32.0 - 36.0 g/dL    RDW 13.4 11.5 - 14.5 %    Platelets 184 150 - 450 x10*3/uL   Basic metabolic panel   Result Value Ref Range    Glucose 149 (H) 65 - 99 mg/dL    Sodium 140 133 - 145 mmol/L    Potassium 3.9 3.4 - 5.1 mmol/L    Chloride 98 97 - 107 mmol/L    Bicarbonate 31 24 - 31 mmol/L    Urea Nitrogen 29 (H) 8 - 25 mg/dL    Creatinine 1.10 0.40 - 1.60 mg/dL    eGFR 72 >60 mL/min/1.73m*2    Calcium 8.7 8.5 - 10.4 mg/dL    Anion Gap 11 <=19 mmol/L   Protime-INR   Result Value Ref Range    Protime 22.8 (H) 9.3 - 12.7 seconds    INR 2.3 (H) 0.9 - 1.2      ECG 12 lead  Result Date: 3/18/2024  Sinus tachycardia with occasional Premature ventricular complexes Possible Left atrial enlargement Borderline ECG When compared with ECG of 24-DEC-2023 10:07, Premature ventricular  complexes are now present.    XR chest 1 view  Result Date: 3/18/2024  CARDIOMEDIASTINAL SILHOUETTE: Cardiomediastinal silhouette is stable in size and configuration.   LUNGS: Low lung volumes with bronchovascular crowding. Hilar fullness with bilateral nonspecific interstitial opacities. No sizable pneumothorax. Small pleural effusions not excluded.   ABDOMEN: No remarkable upper abdominal findings.   BONES: No acute osseous changes.  1.  Findings suggestive of pulmonary vascular congestion with bibasilar atelectasis. Small pleural effusions or airspace opacities not excluded.           Impression  Acute hypoxic respiratory failure  Acute COPD exacerbation  Acute on chronic congestive heart failure  Paroxysmal atrial fibrillation  Chronic kidney disease stage II     Plan  Wean oxygen as sats allow-baseline is 2 L nasal cannula  Continue IBD/ICS  Continuous pulse oximetry  Incentive spirometry/pulmonary hygiene  Continue prednisone  Oral Lasix  FEV1 when optimized  Coumadin  GI prophylaxis  PT/OT/out of bed    Gita Redmond, APRN-CNP  [facility]

## 2024-03-19 NOTE — NURSING NOTE
Assumed care. Patient report done at bedside. Patient is sitting in chair. Call light within reach.

## 2024-03-19 NOTE — PROGRESS NOTES
03/19/24 1710   Discharge Planning   Patient expects to be discharged to: Home     Met with patient at bedside to discuss PT recommendation of low intensity rehab.  Patient is declining HHC.  States he does not feel like he needs anyone coming in to the home.  Patient stated he has oxygen available in the home and he will start using it regularly.

## 2024-03-19 NOTE — NURSING NOTE
Review of chart by HF Nurse Navigator. No indication of HF noted as of this review by cardiology. Per cards consult notes: The A-fib with RVR was likely due to breathing treatments received in the emergency department in combination with the patient not taking his daily medications.   I will not be following at this time. Please submit HF Nurse Navigator referral if needed.

## 2024-03-19 NOTE — CARE PLAN
Problem: Respiratory  Goal: Minimize anxiety/maximize coping throughout shift  Outcome: Progressing  Goal: Minimal/no exertional discomfort or dyspnea this shift  Outcome: Progressing  Goal: Verbalize decreased shortness of breath this shift  Outcome: Progressing  Goal: Wean oxygen to maintain O2 saturation per order/standard this shift  Outcome: Progressing

## 2024-03-19 NOTE — CONSULTS
"Nutrition Assessement Note    Nutrition Assessment    Reason for Assessment: Dietitian discretion (CHF)    Reason for Hospital Admission:  Reggie Munoz is a 73 y.o. male who is admitted for complaints of having cough and cold symptoms. Discussed low sodium diet, however upon recent chart review--No indication of HF noted as of this review by cardiology.     Nutrition History:  Food and Nutrient History: pt stated that he had a good appetite and that he has been eating well.  Energy Intake: Good > 75 %  Food Allergies/Intolerances:  None  GI Symptoms: None  Oral Problems: None    Anthropometrics:  Ht: 170.2 cm (5' 7.01\"), Wt: 80.7 kg (177 lb 14.6 oz), BMI: 27.86  IBW/kg (Dietitian Calculated): 67.27 kg          Weight Change:  Daily Weight  03/18/24 : 80.7 kg (177 lb 14.6 oz)  02/27/24 : 77.1 kg (170 lb)  01/15/24 : 77.1 kg (170 lb)  12/31/23 : 82 kg (180 lb 12.4 oz)  12/24/23 : 82.1 kg (181 lb)  11/20/23 : 82.6 kg (182 lb)  11/03/23 : 80.9 kg (178 lb 5.6 oz)  10/24/23 : 82.6 kg (182 lb)  10/02/23 : 82.6 kg (182 lb)  02/20/23 : 81.6 kg (180 lb)     Weight History / % Weight Change: Pt stated that he lost 12# a few months ago; pt stated petty the went from 182# to 170#.             Nutrition Focused Physical Exam Findings:   Subcutaneous Fat Loss  Orbital Fat Pads: Well nourished (slightly bulging fat pads)  Buccal Fat Pads: Well nourished (full, rounded cheeks)    Muscle Wasting  Temporalis: Well nourished (well-defined muscle)  Pectoralis (Clavicular Region): Well nourished (clavicle not visible)              Nutrition Significant Labs:  Lab Results   Component Value Date    WBC 12.5 (H) 03/19/2024    HGB 13.7 03/19/2024    HCT 43.7 03/19/2024     03/19/2024    CHOL 155 02/14/2022    TRIG 155 (H) 02/14/2022    HDL 66 02/14/2022    ALT 26 03/18/2024    AST 27 03/18/2024     03/19/2024    K 3.9 03/19/2024    CL 98 03/19/2024    CREATININE 1.10 03/19/2024    BUN 29 (H) 03/19/2024    CO2 31 03/19/2024 "    TSH 1.83 02/14/2022    PSA 2.5 02/14/2022    INR 2.3 (H) 03/19/2024       Current Facility-Administered Medications:     acetaminophen (Tylenol) tablet 650 mg, 650 mg, oral, q4h PRN **OR** acetaminophen (Tylenol) oral liquid 650 mg, 650 mg, oral, q4h PRN **OR** acetaminophen (Tylenol) suppository 650 mg, 650 mg, rectal, q4h PRN, Eladia Cali MD    albuterol 2.5 mg /3 mL (0.083 %) nebulizer solution 3 mL, 3 mL, nebulization, q6h PRN, Eladia Cali MD    atorvastatin (Lipitor) tablet 10 mg, 10 mg, oral, Nightly, Elaida Cali MD, 10 mg at 03/18/24 2140    budesonide (Pulmicort) 0.5 mg/2 mL nebulizer solution 0.5 mg, 0.5 mg, nebulization, BID, Eladia Cali MD, 0.5 mg at 03/19/24 0848    cholecalciferol (Vitamin D-3) tablet 2,000 Units, 2,000 Units, oral, Daily, Eladia Cali MD, 2,000 Units at 03/19/24 0920    digoxin (Lanoxin) tablet 125 mcg, 125 mcg, oral, Daily, Eladia Cali MD, 125 mcg at 03/19/24 0920    dilTIAZem CD (Cardizem CD) 24 hr capsule 180 mg, 180 mg, oral, Daily, Eladia Cali MD, 180 mg at 03/19/24 0920    furosemide (Lasix) tablet 40 mg, 40 mg, oral, Daily, Eladia Cali MD, 40 mg at 03/19/24 0920    ipratropium-albuteroL (Duo-Neb) 0.5-2.5 mg/3 mL nebulizer solution 3 mL, 3 mL, nebulization, TID, Eladia Cali MD, 3 mL at 03/19/24 1440    melatonin tablet 3 mg, 3 mg, oral, Nightly, Eladia Cali MD, 3 mg at 03/18/24 2141    ondansetron ODT (Zofran-ODT) disintegrating tablet 4 mg, 4 mg, oral, q8h PRN **OR** ondansetron (Zofran) injection 4 mg, 4 mg, intravenous, q8h PRN, Eladia Cali MD    oxygen (O2) therapy, , inhalation, q8h, Eladia Cali MD, 4 L/min at 03/19/24 0915    pantoprazole (ProtoNix) EC tablet 40 mg, 40 mg, oral, Daily, 40 mg at 03/19/24 0923 **OR** pantoprazole (ProtoNix) injection 40 mg, 40 mg, intravenous, Daily, Eladia Cali MD    polyethylene glycol (Glycolax, Miralax) packet 17 g, 17 g, oral, Daily, Eladia Cali MD    predniSONE (Deltasone) tablet  20 mg, 20 mg, oral, BID with meals, Eladia Cali MD, 20 mg at 03/19/24 0920    warfarin (Coumadin) tablet 2 mg, 2 mg, oral, Daily, Eladia Cali MD, 2 mg at 03/18/24 1647    Dietary Orders (From admission, onward)       Start     Ordered    03/18/24 0801  Adult diet Regular  Diet effective now        Question:  Diet type  Answer:  Regular    03/18/24 0800                  Estimated Needs:   Estimated Energy Needs  Total Energy Estimated Needs (kCal): 2018 kCal  Total Estimated Energy Need per Day (kCal/kg): 25 kCal/kg  Method for Estimating Needs: actual wt    Estimated Protein Needs  Total Protein Estimated Needs (g):  ()  Total Protein Estimated Needs (g/kg):  (1-1.5)  Method for Estimating Needs: actual wt    Estimated Fluid Needs  Method for Estimating Needs: < 2000 mL        Nutrition Diagnosis   Nutrition Diagnosis:       Nutrition Diagnosis  Patient has Nutrition Diagnosis: Yes  Diagnosis Status (1): New  Nutrition Diagnosis 1: Food and nutrition related knowledge deficit  Related to (1): lack of prior knowledge  As Evidenced by (1): conditions associated with diagnosis       Nutrition Interventions/Recommendations   Nutrition Interventions and Recommendations:    Nutrition Prescription:  Individualized Nutrition Prescription Provided for : 2018 kcals,  gm protein via diet    Nutrition Interventions:   Food and/or Nutrient Delivery Interventions  Interventions: Meals and snacks  Meals and Snacks: General healthful diet  Goal: provide diet as ordered      Education Documentation  Nutrition Care Manual, taught by Monica Aguilar RDN, LD at 3/19/2024 10:03 AM.  Learner: Patient  Readiness: Acceptance  Method: Explanation, Handout  Response: Verbalizes Understanding  Comment: Discussed low sodium diet          Nutrition Monitoring and Evaluation   Monitoring/Evaluation:   Food/Nutrient Related History Monitoring  Monitoring and Evaluation Plan: Energy intake  Energy Intake: Estimated energy  intake  Criteria: pt to consume >/= 75% of estimated needs    Body Composition/Growth/Weight History  Monitoring and Evaluation Plan: Weight  Weight: Measured weight  Criteria: pt to maintain wt      Time Spent/Follow-up:   Follow Up  Time Spent (min): 20 minutes  Last Date of Nutrition Visit: 03/19/24  Nutrition Follow-Up Needed?: 7-10 days  Follow up Comment: 3/26/24

## 2024-03-19 NOTE — NURSING NOTE
Assumed care. patient report given at bedside. Patient is awake laying in bed. Bed alarm on, call light within reach.

## 2024-03-20 LAB
ANION GAP SERPL CALC-SCNC: 11 MMOL/L
BUN SERPL-MCNC: 33 MG/DL (ref 8–25)
CALCIUM SERPL-MCNC: 8.9 MG/DL (ref 8.5–10.4)
CHLORIDE SERPL-SCNC: 97 MMOL/L (ref 97–107)
CO2 SERPL-SCNC: 32 MMOL/L (ref 24–31)
CREAT SERPL-MCNC: 1.1 MG/DL (ref 0.4–1.6)
EGFRCR SERPLBLD CKD-EPI 2021: 72 ML/MIN/1.73M*2
ERYTHROCYTE [DISTWIDTH] IN BLOOD BY AUTOMATED COUNT: 13.8 % (ref 11.5–14.5)
GLUCOSE SERPL-MCNC: 131 MG/DL (ref 65–99)
HCT VFR BLD AUTO: 43.1 % (ref 41–52)
HGB BLD-MCNC: 13.7 G/DL (ref 13.5–17.5)
INR PPP: 2.2 (ref 0.9–1.2)
MCH RBC QN AUTO: 29.2 PG (ref 26–34)
MCHC RBC AUTO-ENTMCNC: 31.8 G/DL (ref 32–36)
MCV RBC AUTO: 92 FL (ref 80–100)
NRBC BLD-RTO: 0 /100 WBCS (ref 0–0)
PLATELET # BLD AUTO: 206 X10*3/UL (ref 150–450)
POTASSIUM SERPL-SCNC: 4.2 MMOL/L (ref 3.4–5.1)
PROTHROMBIN TIME: 22.1 SECONDS (ref 9.3–12.7)
RBC # BLD AUTO: 4.69 X10*6/UL (ref 4.5–5.9)
SODIUM SERPL-SCNC: 140 MMOL/L (ref 133–145)
WBC # BLD AUTO: 15.5 X10*3/UL (ref 4.4–11.3)

## 2024-03-20 PROCEDURE — 1210000001 HC SEMI-PRIVATE ROOM DAILY

## 2024-03-20 PROCEDURE — 97110 THERAPEUTIC EXERCISES: CPT | Mod: GP

## 2024-03-20 PROCEDURE — 94640 AIRWAY INHALATION TREATMENT: CPT

## 2024-03-20 PROCEDURE — 85027 COMPLETE CBC AUTOMATED: CPT | Performed by: STUDENT IN AN ORGANIZED HEALTH CARE EDUCATION/TRAINING PROGRAM

## 2024-03-20 PROCEDURE — 97530 THERAPEUTIC ACTIVITIES: CPT | Mod: GO

## 2024-03-20 PROCEDURE — 97535 SELF CARE MNGMENT TRAINING: CPT | Mod: GO

## 2024-03-20 PROCEDURE — 2500000002 HC RX 250 W HCPCS SELF ADMINISTERED DRUGS (ALT 637 FOR MEDICARE OP, ALT 636 FOR OP/ED): Performed by: STUDENT IN AN ORGANIZED HEALTH CARE EDUCATION/TRAINING PROGRAM

## 2024-03-20 PROCEDURE — 2500000001 HC RX 250 WO HCPCS SELF ADMINISTERED DRUGS (ALT 637 FOR MEDICARE OP): Performed by: STUDENT IN AN ORGANIZED HEALTH CARE EDUCATION/TRAINING PROGRAM

## 2024-03-20 PROCEDURE — 2500000005 HC RX 250 GENERAL PHARMACY W/O HCPCS: Performed by: STUDENT IN AN ORGANIZED HEALTH CARE EDUCATION/TRAINING PROGRAM

## 2024-03-20 PROCEDURE — 9420000001 HC RT PATIENT EDUCATION 5 MIN

## 2024-03-20 PROCEDURE — 97530 THERAPEUTIC ACTIVITIES: CPT | Mod: GP

## 2024-03-20 PROCEDURE — 36415 COLL VENOUS BLD VENIPUNCTURE: CPT | Performed by: STUDENT IN AN ORGANIZED HEALTH CARE EDUCATION/TRAINING PROGRAM

## 2024-03-20 PROCEDURE — 2500000004 HC RX 250 GENERAL PHARMACY W/ HCPCS (ALT 636 FOR OP/ED): Performed by: STUDENT IN AN ORGANIZED HEALTH CARE EDUCATION/TRAINING PROGRAM

## 2024-03-20 PROCEDURE — 80048 BASIC METABOLIC PNL TOTAL CA: CPT | Performed by: STUDENT IN AN ORGANIZED HEALTH CARE EDUCATION/TRAINING PROGRAM

## 2024-03-20 PROCEDURE — 85610 PROTHROMBIN TIME: CPT | Performed by: STUDENT IN AN ORGANIZED HEALTH CARE EDUCATION/TRAINING PROGRAM

## 2024-03-20 RX ADMIN — DILTIAZEM HYDROCHLORIDE 180 MG: 180 CAPSULE, COATED, EXTENDED RELEASE ORAL at 09:59

## 2024-03-20 RX ADMIN — WARFARIN SODIUM 2 MG: 2 TABLET ORAL at 18:26

## 2024-03-20 RX ADMIN — BUDESONIDE INHALATION 0.5 MG: 0.5 SUSPENSION RESPIRATORY (INHALATION) at 20:44

## 2024-03-20 RX ADMIN — IPRATROPIUM BROMIDE AND ALBUTEROL SULFATE 3 ML: .5; 3 SOLUTION RESPIRATORY (INHALATION) at 20:43

## 2024-03-20 RX ADMIN — IPRATROPIUM BROMIDE AND ALBUTEROL SULFATE 3 ML: .5; 3 SOLUTION RESPIRATORY (INHALATION) at 13:18

## 2024-03-20 RX ADMIN — Medication 3 MG: at 20:35

## 2024-03-20 RX ADMIN — IPRATROPIUM BROMIDE AND ALBUTEROL SULFATE 3 ML: .5; 3 SOLUTION RESPIRATORY (INHALATION) at 08:38

## 2024-03-20 RX ADMIN — Medication: at 23:00

## 2024-03-20 RX ADMIN — PREDNISONE 20 MG: 20 TABLET ORAL at 18:26

## 2024-03-20 RX ADMIN — PREDNISONE 20 MG: 20 TABLET ORAL at 08:04

## 2024-03-20 RX ADMIN — Medication 2000 UNITS: at 10:00

## 2024-03-20 RX ADMIN — ATORVASTATIN CALCIUM 10 MG: 10 TABLET, FILM COATED ORAL at 20:35

## 2024-03-20 RX ADMIN — BUDESONIDE INHALATION 0.5 MG: 0.5 SUSPENSION RESPIRATORY (INHALATION) at 08:38

## 2024-03-20 RX ADMIN — PANTOPRAZOLE SODIUM 40 MG: 40 TABLET, DELAYED RELEASE ORAL at 10:00

## 2024-03-20 RX ADMIN — DIGOXIN 125 MCG: 125 TABLET ORAL at 10:00

## 2024-03-20 RX ADMIN — Medication: at 07:00

## 2024-03-20 RX ADMIN — Medication 3 L/MIN: at 15:00

## 2024-03-20 RX ADMIN — FUROSEMIDE 40 MG: 40 TABLET ORAL at 09:59

## 2024-03-20 ASSESSMENT — ACTIVITIES OF DAILY LIVING (ADL)
BATHING_WHERE_ASSESSED: EDGE OF BED
HOME_MANAGEMENT_TIME_ENTRY: 20
BATHING_LEVEL_OF_ASSISTANCE: CLOSE SUPERVISION;SETUP

## 2024-03-20 ASSESSMENT — COGNITIVE AND FUNCTIONAL STATUS - GENERAL
MOBILITY SCORE: 19
WALKING IN HOSPITAL ROOM: A LITTLE
DRESSING REGULAR LOWER BODY CLOTHING: A LITTLE
MOVING TO AND FROM BED TO CHAIR: A LITTLE
CLIMB 3 TO 5 STEPS WITH RAILING: A LOT
STANDING UP FROM CHAIR USING ARMS: A LITTLE
MOBILITY SCORE: 19
STANDING UP FROM CHAIR USING ARMS: A LITTLE
CLIMB 3 TO 5 STEPS WITH RAILING: A LOT
DRESSING REGULAR UPPER BODY CLOTHING: A LITTLE
MOVING TO AND FROM BED TO CHAIR: A LITTLE
DAILY ACTIVITIY SCORE: 20
HELP NEEDED FOR BATHING: A LITTLE
TOILETING: A LITTLE
HELP NEEDED FOR BATHING: A LITTLE
PERSONAL GROOMING: A LITTLE
PERSONAL GROOMING: A LITTLE
TOILETING: A LITTLE
EATING MEALS: A LITTLE
DRESSING REGULAR LOWER BODY CLOTHING: A LITTLE
DAILY ACTIVITIY SCORE: 18
WALKING IN HOSPITAL ROOM: A LITTLE

## 2024-03-20 ASSESSMENT — PAIN SCALES - GENERAL
PAINLEVEL_OUTOF10: 0 - NO PAIN

## 2024-03-20 ASSESSMENT — PAIN - FUNCTIONAL ASSESSMENT
PAIN_FUNCTIONAL_ASSESSMENT: 0-10

## 2024-03-20 NOTE — PROGRESS NOTES
Physical Therapy    Physical Therapy Treatment    Patient Name: Reggie Munoz  MRN: [id number]  Today's Date: 3/20/2024  Time Calculation  Start Time: 1015  Stop Time: 1045  Time Calculation (min): 30 min       Assessment/Plan   PT Assessment  End of Session Communication: Bedside nurse  Assessment Comment: pt demonstrates decreased safety insight during transfers/amb without device-----demonstrates improved amb tolerance and ease when focusing on erect posture, pursed lip breathing; may benefit from LRAD during extended amb distances  End of Session Patient Position: Up in chair, Alarm on  PT Plan  Inpatient/Swing Bed or Outpatient: Inpatient  PT Plan  Treatment/Interventions: Bed mobility, Transfer training, Gait training, Stair training, Balance training, Neuromuscular re-education, Strengthening, Endurance training, Therapeutic exercise, Therapeutic activity, Home exercise program  PT Plan: Skilled PT  PT Frequency: 4 times per week  PT Discharge Recommendations: Low intensity level of continued care  Equipment Recommended upon Discharge: Wheeled walker, Straight cane (for extended distances)  PT Recommended Transfer Status: Stand by assist  PT - OK to Discharge: Yes      General Visit Information:   PT  Visit  PT Received On: 03/20/24  General  Prior to Session Communication: Bedside nurse  Patient Position Received: Alarm on, Up in chair  Preferred Learning Style: verbal, visual  General Comment: cleared by nurse for therapy; pt agreeable to therapy    Subjective   Precautions:  Precautions  Hearing/Visual Limitations: glasses  Medical Precautions: Fall precautions, Oxygen therapy device and L/min (3 liters o2 via nc)  Vital Signs:  Vital Signs  Heart Rate: 86  SpO2: 90 %  Patient Position: Sitting    Objective   Pain:  Pain Assessment  Pain Assessment: 0-10  Pain Score: 0 - No pain  Cognition:  Cognition  Overall Cognitive Status: Within Functional Limits  Safety/Judgement:  (decreased safety insight  during functional mobility)  Complex Functional Tasks: Minimal  Insight: Mild  Impulsive: Mildly  Postural Control:  Postural Control  Posture Comment: + mod to severe forward head, + mod T-spine kyphosis  Extremity/Trunk Assessments:     Activity Tolerance:  Activity Tolerance  Endurance: Decreased tolerance for upright activites  Activity Tolerance Comments: limited erect standing tolerance during amb trials without device  Treatments:  Therapeutic Exercise  Therapeutic Exercise Performed: Yes  Therapeutic Exercise Activity 1: seated renee AP/heel raises x 30 reps  Therapeutic Exercise Activity 2: seated renee LAQ's x 2 sets of 15 reps  Therapeutic Exercise Activity 3: seated renee marching x 2 sets of 15 reps  Therapeutic Exercise Activity 4: seated pursed lip/deep breathing x 3 sets of 5 reps    Therapeutic Activity  Therapeutic Activity Performed: Yes (see transfers and amb without device comments)    Ambulation/Gait Training  Ambulation/Gait Training Performed: Yes  Ambulation/Gait Training 1  Surface 1: Level tile  Device 1: No device  Assistance 1: Contact guard, Moderate verbal cues  Quality of Gait 1: Forward flexed posture, Narrow base of support, Decreased step length  Comments/Distance (ft) 1: pt amb 50 ft x 2 without device, contact guard of 1, verbal cues for erect posture as able, pursed lip breathing and increased B of S. o2 sat 92% with  bpm  Ambulation/Gait Training 2  Surface 2: Level tile  Device 2: No device  Assistance 2: Contact guard, Moderate verbal cues  Quality of Gait 2: Narrow base of support, Forward flexed posture  Comments/Distance (ft) 2: pt amb 50 ft x 2 without device, contact guard of 1, verbal cues as mentioned on 1st amb trial----balance fair + with erect posture as able; otherwise fair to fair -; discussed use of cane vs FWW for extended amb distances  Transfers  Transfer: Yes  Transfer 1  Transfer From 1: Sit to  Transfer to 1: Stand  Technique 1: Sit to stand  Transfer Level  of Assistance 1: Close supervision, Minimal verbal cues  Trials/Comments 1: close supervision of 1 for trunk up, verbal cues for proper renee hand placement  Transfers 2  Transfer From 2: Stand to  Transfer to 2: Sit  Technique 2: Stand to sit  Transfer Level of Assistance 2: Close supervision, Minimal verbal cues  Trials/Comments 2: close supervision of 1 for trunk down, verbal cues for reaching back for arms of chair with both  hands    Outcome Measures:  [facility] Basic Mobility  Turning from your back to your side while in a flat bed without using bedrails: None  Moving from lying on your back to sitting on the side of a flat bed without using bedrails: None  Moving to and from bed to chair (including a wheelchair): A little  Standing up from a chair using your arms (e.g. wheelchair or bedside chair): A little  To walk in hospital room: A little  Climbing 3-5 steps with railing: A lot  Basic Mobility - Total Score: 19    Education Documentation  No documentation found.  Education Comments  No comments found.        OP EDUCATION:       Encounter Problems       Encounter Problems (Active)       Mobility       STG - Patient will ambulate 100' with rolling walker and modified independence. (Progressing)       Start:  03/18/24    Expected End:  04/01/24            STG - Patient will ascend and descend a flight of stairs with use of one handrail and modified independence. (Progressing)       Start:  03/18/24    Expected End:  04/01/24               Safety       STG - Patient uses call light consistently to request assistance with transfers (Progressing)       Start:  03/18/24    Expected End:  04/01/24

## 2024-03-20 NOTE — PROGRESS NOTES
"Reggie Munoz is a 73 y.o. male on day 2 of admission seen in follow-up for acute hypoxia, acute COPD exacerbation    Subjective   Working with therapy. On 3 L nasal cannula oxygen; O2 sats 95%.  Endorses improved breathing.  Denies pain.  Afebrile.       Objective     Physical Exam  Vitals and nursing note reviewed.   Constitutional:       Appearance: Normal appearance.   HENT:      Head: Normocephalic and atraumatic.      Nose: Nose normal.      Mouth/Throat:      Mouth: Mucous membranes are moist.   Eyes:      Extraocular Movements: Extraocular movements intact.      Conjunctiva/sclera: Conjunctivae normal.      Pupils: Pupils are equal, round, and reactive to light.   Cardiovascular:      Rate and Rhythm: Normal rate and regular rhythm.      Pulses: Normal pulses.      Heart sounds: Normal heart sounds.   Pulmonary:      Effort: Pulmonary effort is normal.      Breath sounds: Normal breath sounds.      Comments: Lungs diminished but clear.  Abdominal:      General: Bowel sounds are normal.      Palpations: Abdomen is soft.   Musculoskeletal:         General: Normal range of motion.   Skin:     General: Skin is warm and dry.      Capillary Refill: Capillary refill takes less than 2 seconds.   Neurological:      General: No focal deficit present.      Mental Status: He is alert and oriented to person, place, and time.   Psychiatric:         Mood and Affect: Mood normal.         Behavior: Behavior normal.         Last Recorded Vitals  Blood pressure 126/60, pulse 88, temperature 36.8 °C (98.2 °F), temperature source Oral, resp. rate 20, height 1.702 m (5' 7.01\"), weight 80.7 kg (177 lb 14.6 oz), SpO2 95 %.  Intake/Output last 3 Shifts:  I/O last 3 completed shifts:  In: 735 (9.1 mL/kg) [P.O.:735]  Out: - (0 mL/kg)   Weight: 80.7 kg       Intake/Output Summary (Last 24 hours) at 3/20/2024 0934  Last data filed at 3/19/2024 1800  Gross per 24 hour   Intake 490 ml   Output --   Net 490 ml        atorvastatin, 10 " mg, oral, Nightly  budesonide, 0.5 mg, nebulization, BID  cholecalciferol, 2,000 Units, oral, Daily  digoxin, 125 mcg, oral, Daily  dilTIAZem CD, 180 mg, oral, Daily  furosemide, 40 mg, oral, Daily  ipratropium-albuteroL, 3 mL, nebulization, TID  melatonin, 3 mg, oral, Nightly  oxygen, , inhalation, q8h  pantoprazole, 40 mg, oral, Daily   Or  pantoprazole, 40 mg, intravenous, Daily  polyethylene glycol, 17 g, oral, Daily  predniSONE, 20 mg, oral, BID with meals  warfarin, 2 mg, oral, Daily       PRN medications: acetaminophen **OR** acetaminophen **OR** acetaminophen, albuterol, ondansetron ODT **OR** ondansetron       Relevant Results  Results for orders placed or performed during the hospital encounter of 03/18/24 (from the past 24 hour(s))   Protime-INR   Result Value Ref Range    Protime 22.1 (H) 9.3 - 12.7 seconds    INR 2.2 (H) 0.9 - 1.2   CBC   Result Value Ref Range    WBC 15.5 (H) 4.4 - 11.3 x10*3/uL    nRBC 0.0 0.0 - 0.0 /100 WBCs    RBC 4.69 4.50 - 5.90 x10*6/uL    Hemoglobin 13.7 13.5 - 17.5 g/dL    Hematocrit 43.1 41.0 - 52.0 %    MCV 92 80 - 100 fL    MCH 29.2 26.0 - 34.0 pg    MCHC 31.8 (L) 32.0 - 36.0 g/dL    RDW 13.8 11.5 - 14.5 %    Platelets 206 150 - 450 x10*3/uL   Basic metabolic panel   Result Value Ref Range    Glucose 131 (H) 65 - 99 mg/dL    Sodium 140 133 - 145 mmol/L    Potassium 4.2 3.4 - 5.1 mmol/L    Chloride 97 97 - 107 mmol/L    Bicarbonate 32 (H) 24 - 31 mmol/L    Urea Nitrogen 33 (H) 8 - 25 mg/dL    Creatinine 1.10 0.40 - 1.60 mg/dL    eGFR 72 >60 mL/min/1.73m*2    Calcium 8.9 8.5 - 10.4 mg/dL    Anion Gap 11 <=19 mmol/L      ECG 12 lead  Result Date: 3/18/2024  Sinus tachycardia with occasional Premature ventricular complexes Possible Left atrial enlargement Borderline ECG When compared with ECG of 24-DEC-2023 10:07, Premature ventricular complexes are now present.    XR chest 1 view  Result Date: 3/18/2024  CARDIOMEDIASTINAL SILHOUETTE: Cardiomediastinal silhouette is stable in  size and configuration.   LUNGS: Low lung volumes with bronchovascular crowding. Hilar fullness with bilateral nonspecific interstitial opacities. No sizable pneumothorax. Small pleural effusions not excluded.   ABDOMEN: No remarkable upper abdominal findings.   BONES: No acute osseous changes.  1.  Findings suggestive of pulmonary vascular congestion with bibasilar atelectasis. Small pleural effusions or airspace opacities not excluded.           Impression  Acute hypoxic respiratory failure  Acute COPD exacerbation  Acute on chronic congestive heart failure  Paroxysmal atrial fibrillation  Chronic kidney disease stage II     Plan  Wean oxygen as sats allow-baseline is 2 L nasal cannula  Continue IBD/ICS  Continuous pulse oximetry  Incentive spirometry/pulmonary hygiene  Continue prednisone  Oral Lasix  FEV1 when optimized  Coumadin  GI prophylaxis  PT/OT/out of bed    Gita Redmond, APRN-CNP  [facility]

## 2024-03-20 NOTE — NURSING NOTE
Assumed care of patient, patient is in bed awake and has nursing student today, call light and possessions within reach.

## 2024-03-20 NOTE — NURSING NOTE
COPD education/book given to patient. Discussed importance of taking medications as prescribed/following up with physician appointments. Pulmonary rehab education given to patient.

## 2024-03-20 NOTE — PROGRESS NOTES
03/20/24 1109   Discharge Planning   Who is requesting discharge planning? Provider   Home or Post Acute Services None   Patient expects to be discharged to: Home-refusing HHC     Safe dc plan secured home no skilled needs-pt can go when dc. No further needs from [facility] unless pt changes his mind.

## 2024-03-20 NOTE — PROGRESS NOTES
Reggie Munoz is a 73 y.o. male on day 2 of admission presenting with COPD exacerbation (CMS/HCC).      Subjective   Patient seen and examined. Awake/alert/oriented. Remains on 3L oxygen via NC. Reports wheezing and moist non productive cough. Did ambulate in the hallways today. Denies shortness of breath or chest pain. No nausea or vomiting.        Objective     Last Recorded Vitals  /60 (BP Location: Left arm, Patient Position: Lying)   Pulse 86   Temp 36.8 °C (98.2 °F) (Oral)   Resp 20   Wt 80.7 kg (177 lb 14.6 oz)   SpO2 92%   Intake/Output last 3 Shifts:    Intake/Output Summary (Last 24 hours) at 3/20/2024 1330  Last data filed at 3/20/2024 0946  Gross per 24 hour   Intake 450 ml   Output --   Net 450 ml       Admission Weight  Weight: 80.7 kg (178 lb) (03/18/24 0438)    Daily Weight  03/18/24 : 80.7 kg (177 lb 14.6 oz)    Image Results  ECG 12 lead  Sinus tachycardia with occasional Premature ventricular complexes  Possible Left atrial enlargement  Borderline ECG  When compared with ECG of 24-DEC-2023 10:07,  Premature ventricular complexes are now Present  Confirmed by Joshua Lopez ([id number]) on 3/18/2024 7:39:25 PM  XR chest 1 view  Narrative: Interpreted By:  Tiffanie Gonzales,   STUDY:  XR CHEST 1 VIEW;  3/18/2024 5:11 am      INDICATION:  Signs/Symptoms:Shortness of breath.      COMPARISON:  12/24/2023      ACCESSION NUMBER(S):  [id number]      ORDERING CLINICIAN:  GARY MAE      FINDINGS:  AP radiograph of the chest was provided.              CARDIOMEDIASTINAL SILHOUETTE:  Cardiomediastinal silhouette is stable in size and configuration.      LUNGS:  Low lung volumes with bronchovascular crowding. Hilar fullness with  bilateral nonspecific interstitial opacities. No sizable  pneumothorax. Small pleural effusions not excluded.      ABDOMEN:  No remarkable upper abdominal findings.      BONES:  No acute osseous changes.      Impression: 1.  Findings suggestive of pulmonary vascular  congestion with  bibasilar atelectasis. Small pleural effusions or airspace opacities  not excluded.              MACRO:  None      Signed by: Tiffanie Gonzales 3/18/2024 5:58 AM  Dictation workstation:   [id number]      Physical Exam  Vitals reviewed.   Constitutional:       Appearance: Normal appearance.   HENT:      Head: Normocephalic and atraumatic.   Eyes:      Extraocular Movements: Extraocular movements intact.      Conjunctiva/sclera: Conjunctivae normal.   Cardiovascular:      Rate and Rhythm: Normal rate and regular rhythm.   Pulmonary:      Effort: Pulmonary effort is normal.      Breath sounds: Wheezing present. No rhonchi or rales.   Abdominal:      General: Bowel sounds are normal.      Palpations: Abdomen is soft.      Tenderness: There is no abdominal tenderness.   Musculoskeletal:         General: Normal range of motion.   Skin:     General: Skin is warm and dry.   Neurological:      General: No focal deficit present.      Mental Status: He is alert and oriented to person, place, and time.         Relevant Results  Lab Results   Component Value Date    GLUCOSE 131 (H) 03/20/2024    CALCIUM 8.9 03/20/2024     03/20/2024    K 4.2 03/20/2024    CO2 32 (H) 03/20/2024    CL 97 03/20/2024    BUN 33 (H) 03/20/2024    CREATININE 1.10 03/20/2024     Lab Results   Component Value Date    WBC 15.5 (H) 03/20/2024    HGB 13.7 03/20/2024    HCT 43.1 03/20/2024    MCV 92 03/20/2024     03/20/2024     ECG 12 lead    Result Date: 3/18/2024  Sinus tachycardia with occasional Premature ventricular complexes Possible Left atrial enlargement Borderline ECG When compared with ECG of 24-DEC-2023 10:07, Premature ventricular complexes are now Present Confirmed by Joshua Lopez ([id number]) on 3/18/2024 7:39:25 PM    XR chest 1 view    Result Date: 3/18/2024  Interpreted By:  Tiffanie Gonzales, STUDY: XR CHEST 1 VIEW;  3/18/2024 5:11 am   INDICATION: Signs/Symptoms:Shortness of breath.   COMPARISON: 12/24/2023    ACCESSION NUMBER(S): [id number]   ORDERING CLINICIAN: GARY MAE   FINDINGS: AP radiograph of the chest was provided.       CARDIOMEDIASTINAL SILHOUETTE: Cardiomediastinal silhouette is stable in size and configuration.   LUNGS: Low lung volumes with bronchovascular crowding. Hilar fullness with bilateral nonspecific interstitial opacities. No sizable pneumothorax. Small pleural effusions not excluded.   ABDOMEN: No remarkable upper abdominal findings.   BONES: No acute osseous changes.       1.  Findings suggestive of pulmonary vascular congestion with bibasilar atelectasis. Small pleural effusions or airspace opacities not excluded.       MACRO: None   Signed by: Tiffanie Gonzales 3/18/2024 5:58 AM Dictation workstation:   [id number]           Assessment/Plan      Principal Problem:    COPD exacerbation (CMS/HCC)    Acute on chronic hypoxic respiratory failure  Secondary to COPD exacerbation  Patient is supposed to wear 2L at home but denies wearing it. Likely some underlying compliance issues  Consult pulmonology, appreciate recommendations  COVID/influenza negative  Prednisone 20mg BID  Aerosols PRN  Wean O2 as able-- currently at 3L  Will need oxygen cert prior to discharge     Leukocytosis- improving  Likely infectious vs steroid induced  Dispense report showing active script for prednisone 10mg daily per Dr. Kilgore  Unclear if patient is actually taking steroids daily     Paroxsymal atrial fibrillation  Continue home digoxin and diltiazem  Continue warfarin  PT/INR daily     Chronic diastolic heart failure  Continue home lasix     CKD2  Baseline Cr 1.0-1.2  Currently stable at baseline    Plan  Continue oral steroids  IBD/ICS  Supplemental oxygen  Pulm on consult, FEV 1 when optimized, appreciate recs  DVT prophylaxis: Coumadin  PT/OT, recommended low intensity rehab  Patient declines C, he is agreeable to outpatient PT/OT at , will need script on discharge  CBC and BMP in AM  O2 cert prior to  discharge     FULL CODE  POA: Velia Morrison, sister [phone]            Saima Galeana, APRN-CNP

## 2024-03-20 NOTE — PROGRESS NOTES
Occupational Therapy    Occupational Therapy Treatment    Name: Reggie Munoz  MRN: [id number]  : 1950  Date: 24  Time Calculation  Start Time: 920  Stop Time: 949  Time Calculation (min): 29 min    Assessment:  OT Assessment: Patient is demonstrating imrpovements throughout. Supervision and assist for safety with ADLs. He would benefit from contonued therapy in order to continue to increase patient's safety and independence with daily tasks.  Prognosis: Good  Barriers to Discharge: Decreased caregiver support  Evaluation/Treatment Tolerance: Patient tolerated treatment well  End of Session Communication: Bedside nurse  End of Session Patient Position: Up in chair, Alarm on  Plan:  Treatment Interventions: ADL retraining, Functional transfer training, UE strengthening/ROM, Endurance training, Patient/family training, Neuromuscular reeducation, Compensatory technique education  OT Frequency: 4 times per week  OT Discharge Recommendations: Moderate intensity level of continued care  Equipment Recommended upon Discharge: Wheeled walker  OT Recommended Transfer Status: Stand by assist, Minimal assist, Assist of 1  OT - OK to Discharge: Yes    Subjective   Previous Visit Info:  OT Last Visit  OT Received On: 24  General:  General  Reason for Referral: f/u for decline in ADLs due to COPD exacerbation  Referred By: Dr. Viraj MD  Past Medical History Relevant to Rehab: HTN, Afib, COPD, CHF, macular degeneration, HLD, JACK, osteopenia, prostate CA.  Prior to Session Communication: Bedside nurse  Patient Position Received: Bed, 2 rail up, Alarm off, not on at start of session  Preferred Learning Style: verbal  General Comment: Patient cleared for therapy. Patient in bed upon arrival and agreeable to participate  Precautions:  Hearing/Visual Limitations: glasses  Medical Precautions: Fall precautions, Oxygen therapy device and L/min (2.5L O2 via NC)  Pain Assessment:  Pain Assessment  Pain Score: 0 - No  pain     Objective   Activities of Daily Living: Grooming  Grooming Level of Assistance: Close supervision  Grooming Where Assessed: Standing sinkside  Grooming Comments: brush teeth, comb hair    UE Bathing  UE Bathing Level of Assistance: Close supervision, Setup  UE Bathing Where Assessed: Edge of bed  UE Bathing Comments: UB sponge bath    LE Bathing  LE Bathing Level of Assistance: Close supervision, Setup  LE Bathing Where Assessed: Edge of bed  LE Bathing Comments: LBB sponge bath    UE Dressing  UE Dressing Level of Assistance: Minimum assistance  UE Dressing Where Assessed: Edge of bed  UE Dressing Comments: able to doff gown, assistance to pull around back to don gown    LE Dressing  Pants Level of Assistance: Close supervision (in standing to doff/don over hips)  Sock Level of Assistance:  (declines socks)  Shoe Level of Assistance: Setup, Close supervision (sitting EOB, doff/don sippers)  LE Dressing Where Assessed: Edge of bed    Toileting  Toileting Level of Assistance: Close supervision  Where Assessed: Toilet    Functional Standing Tolerance:  Functional Standing Tolerance  Time: ~5 minutes  Activity: ADLs  Functional Standing Tolerance Comments: completed 2x standing sinkside  Bed Mobility/Transfers: Bed Mobility  Bed Mobility: Yes  Bed Mobility 1  Bed Mobility 1: Supine to sitting  Level of Assistance 1: Close supervision  Bed Mobility Comments 1: head of bed elevated    Transfers  Transfer: Yes  Transfer 1  Transfer From 1: Bed to  Transfer to 1: Stand  Technique 1: Sit to stand  Transfer Level of Assistance 1: Contact guard  Trials/Comments 1: x4 trials  Transfers 2  Transfer From 2: Chair with arms to  Transfer to 2: Stand  Technique 2: Sit to stand  Transfer Level of Assistance 2: Contact guard  Trials/Comments 2: x2 trials  Transfers 3  Transfer From 3: Toilet to  Transfer to 3: Stand  Technique 3: Sit to stand  Transfer Level of Assistance 3: Contact guard  Trials/Comments 3: with use of grab  bars    Toilet Transfers  Toilet Transfer Type: To and from  Toilet Transfer to: Standard toilet  Toilet Transfer Technique: Ambulating  Toilet Transfers: Contact guard  Toilet Transfers Comments: no AD    Functional Mobility:  Functional Mobility  Functional Mobility Performed: Yes  Functional Mobility 1  Surface 1: Level tile  Assistance 1: Contact guard  Comments 1: functional mobility to and from the bathroom without device. patient declining use of device. he attempts to use it and is very unsafe, however, is declining education on safety, that patient does not use device  Sitting Balance:  Dynamic Sitting Balance  Dynamic Sitting-Balance Support: Feet supported  Dynamic Sitting-Balance: Forward lean  Dynamic Sitting-Comments: head forward/kyphotic positioning  Standing Balance:  Dynamic Standing Balance  Dynamic Standing-Balance Support: No upper extremity supported    Outcome Measures:  [facility] Daily Activity  Putting on and taking off regular lower body clothing: A little  Bathing (including washing, rinsing, drying): A little  Putting on and taking off regular upper body clothing: A little  Toileting, which includes using toilet, bedpan or urinal: A little  Taking care of personal grooming such as brushing teeth: A little  Eating Meals: A little  Daily Activity - Total Score: 18    Education Documentation  ADL Training, taught by Kavya Noel OT at 3/20/2024 10:13 AM.  Learner: Patient  Readiness: Acceptance  Method: Explanation, Demonstration  Response: Verbalizes Understanding, Needs Reinforcement    Education Comments  No comments found.      Goals:  Encounter Problems       Encounter Problems (Active)       OT Goals       Patient will be able to complete all UB/LB ADL tasks of bathing, dressing, toileting and grooming with modified independence. (Progressing)       Start:  03/18/24    Expected End:  04/01/24            Patient will be able to complete all functional transfers/mobility with least  restrictive device with modified independence using good safety and with G balance.  (Progressing)       Start:  03/18/24    Expected End:  04/01/24            Patient will be able to tolerate 10-15 min of functional standing with G balance in prep for ADL/transfers (Progressing)       Start:  03/18/24    Expected End:  04/01/24

## 2024-03-20 NOTE — NURSING NOTE
"1101, patient refused lunch when asked. Patient stated, \"I do not want to eat lunch.\" Will encourage to drink more liquids.   "

## 2024-03-20 NOTE — NURSING NOTE
Review of chart by HF Nurse Navigator. No indication of acute on chronic HF noted as of this review by attending. I will not be following at this time. Please submit HF Nurse Navigator referral if needed.

## 2024-03-20 NOTE — CARE PLAN
The patient's goals for the shift include improve breathing    The clinical goals for the shift include safety

## 2024-03-21 ENCOUNTER — APPOINTMENT (OUTPATIENT)
Dept: RADIOLOGY | Facility: HOSPITAL | Age: 74
DRG: 190 | End: 2024-03-21
Payer: MEDICARE

## 2024-03-21 LAB
ANION GAP SERPL CALC-SCNC: 9 MMOL/L
BUN SERPL-MCNC: 35 MG/DL (ref 8–25)
CALCIUM SERPL-MCNC: 8.9 MG/DL (ref 8.5–10.4)
CHLORIDE SERPL-SCNC: 99 MMOL/L (ref 97–107)
CO2 SERPL-SCNC: 32 MMOL/L (ref 24–31)
CREAT SERPL-MCNC: 1.2 MG/DL (ref 0.4–1.6)
EGFRCR SERPLBLD CKD-EPI 2021: 65 ML/MIN/1.73M*2
ERYTHROCYTE [DISTWIDTH] IN BLOOD BY AUTOMATED COUNT: 13.5 % (ref 11.5–14.5)
GLUCOSE SERPL-MCNC: 122 MG/DL (ref 65–99)
HCT VFR BLD AUTO: 45.5 % (ref 41–52)
HGB BLD-MCNC: 14.7 G/DL (ref 13.5–17.5)
INR PPP: 2.1 (ref 0.9–1.2)
LACTATE BLDV-SCNC: 2.9 MMOL/L (ref 0.4–2)
LACTATE BLDV-SCNC: 3 MMOL/L (ref 0.4–2)
MCH RBC QN AUTO: 29.3 PG (ref 26–34)
MCHC RBC AUTO-ENTMCNC: 32.3 G/DL (ref 32–36)
MCV RBC AUTO: 91 FL (ref 80–100)
NRBC BLD-RTO: 0 /100 WBCS (ref 0–0)
PLATELET # BLD AUTO: 208 X10*3/UL (ref 150–450)
POTASSIUM SERPL-SCNC: 4.2 MMOL/L (ref 3.4–5.1)
PROTHROMBIN TIME: 21 SECONDS (ref 9.3–12.7)
RBC # BLD AUTO: 5.02 X10*6/UL (ref 4.5–5.9)
SODIUM SERPL-SCNC: 140 MMOL/L (ref 133–145)
WBC # BLD AUTO: 12.4 X10*3/UL (ref 4.4–11.3)

## 2024-03-21 PROCEDURE — 94664 DEMO&/EVAL PT USE INHALER: CPT

## 2024-03-21 PROCEDURE — 71046 X-RAY EXAM CHEST 2 VIEWS: CPT

## 2024-03-21 PROCEDURE — 9420000001 HC RT PATIENT EDUCATION 5 MIN

## 2024-03-21 PROCEDURE — 2500000001 HC RX 250 WO HCPCS SELF ADMINISTERED DRUGS (ALT 637 FOR MEDICARE OP): Performed by: STUDENT IN AN ORGANIZED HEALTH CARE EDUCATION/TRAINING PROGRAM

## 2024-03-21 PROCEDURE — 2500000004 HC RX 250 GENERAL PHARMACY W/ HCPCS (ALT 636 FOR OP/ED): Performed by: NURSE PRACTITIONER

## 2024-03-21 PROCEDURE — 2500000002 HC RX 250 W HCPCS SELF ADMINISTERED DRUGS (ALT 637 FOR MEDICARE OP, ALT 636 FOR OP/ED): Performed by: STUDENT IN AN ORGANIZED HEALTH CARE EDUCATION/TRAINING PROGRAM

## 2024-03-21 PROCEDURE — 2500000004 HC RX 250 GENERAL PHARMACY W/ HCPCS (ALT 636 FOR OP/ED): Performed by: STUDENT IN AN ORGANIZED HEALTH CARE EDUCATION/TRAINING PROGRAM

## 2024-03-21 PROCEDURE — 36415 COLL VENOUS BLD VENIPUNCTURE: CPT | Performed by: NURSE PRACTITIONER

## 2024-03-21 PROCEDURE — 85027 COMPLETE CBC AUTOMATED: CPT | Performed by: STUDENT IN AN ORGANIZED HEALTH CARE EDUCATION/TRAINING PROGRAM

## 2024-03-21 PROCEDURE — 2500000001 HC RX 250 WO HCPCS SELF ADMINISTERED DRUGS (ALT 637 FOR MEDICARE OP): Performed by: NURSE PRACTITIONER

## 2024-03-21 PROCEDURE — 85610 PROTHROMBIN TIME: CPT | Performed by: STUDENT IN AN ORGANIZED HEALTH CARE EDUCATION/TRAINING PROGRAM

## 2024-03-21 PROCEDURE — 94640 AIRWAY INHALATION TREATMENT: CPT

## 2024-03-21 PROCEDURE — 36415 COLL VENOUS BLD VENIPUNCTURE: CPT | Performed by: STUDENT IN AN ORGANIZED HEALTH CARE EDUCATION/TRAINING PROGRAM

## 2024-03-21 PROCEDURE — 83605 ASSAY OF LACTIC ACID: CPT | Performed by: NURSE PRACTITIONER

## 2024-03-21 PROCEDURE — 1210000001 HC SEMI-PRIVATE ROOM DAILY

## 2024-03-21 PROCEDURE — 80048 BASIC METABOLIC PNL TOTAL CA: CPT | Performed by: STUDENT IN AN ORGANIZED HEALTH CARE EDUCATION/TRAINING PROGRAM

## 2024-03-21 RX ORDER — AMOXICILLIN AND CLAVULANATE POTASSIUM 875; 125 MG/1; MG/1
1 TABLET, FILM COATED ORAL EVERY 12 HOURS SCHEDULED
Status: DISCONTINUED | OUTPATIENT
Start: 2024-03-21 | End: 2024-03-23 | Stop reason: HOSPADM

## 2024-03-21 RX ADMIN — AMOXICILLIN AND CLAVULANATE POTASSIUM 1 TABLET: 875; 125 TABLET, FILM COATED ORAL at 16:13

## 2024-03-21 RX ADMIN — IPRATROPIUM BROMIDE AND ALBUTEROL SULFATE 3 ML: .5; 3 SOLUTION RESPIRATORY (INHALATION) at 11:43

## 2024-03-21 RX ADMIN — PANTOPRAZOLE SODIUM 40 MG: 40 TABLET, DELAYED RELEASE ORAL at 09:28

## 2024-03-21 RX ADMIN — DIGOXIN 125 MCG: 125 TABLET ORAL at 09:28

## 2024-03-21 RX ADMIN — SODIUM CHLORIDE 500 ML: 900 INJECTION, SOLUTION INTRAVENOUS at 16:15

## 2024-03-21 RX ADMIN — Medication 2000 UNITS: at 09:28

## 2024-03-21 RX ADMIN — DILTIAZEM HYDROCHLORIDE 180 MG: 180 CAPSULE, COATED, EXTENDED RELEASE ORAL at 09:28

## 2024-03-21 RX ADMIN — IPRATROPIUM BROMIDE AND ALBUTEROL SULFATE 3 ML: .5; 3 SOLUTION RESPIRATORY (INHALATION) at 07:43

## 2024-03-21 RX ADMIN — BUDESONIDE INHALATION 0.5 MG: 0.5 SUSPENSION RESPIRATORY (INHALATION) at 22:25

## 2024-03-21 RX ADMIN — ATORVASTATIN CALCIUM 10 MG: 10 TABLET, FILM COATED ORAL at 21:12

## 2024-03-21 RX ADMIN — FUROSEMIDE 40 MG: 40 TABLET ORAL at 09:28

## 2024-03-21 RX ADMIN — IPRATROPIUM BROMIDE AND ALBUTEROL SULFATE 3 ML: .5; 3 SOLUTION RESPIRATORY (INHALATION) at 22:25

## 2024-03-21 RX ADMIN — PREDNISONE 20 MG: 20 TABLET ORAL at 09:28

## 2024-03-21 RX ADMIN — PREDNISONE 20 MG: 20 TABLET ORAL at 16:13

## 2024-03-21 RX ADMIN — BUDESONIDE INHALATION 0.5 MG: 0.5 SUSPENSION RESPIRATORY (INHALATION) at 07:44

## 2024-03-21 RX ADMIN — WARFARIN SODIUM 2 MG: 2 TABLET ORAL at 16:14

## 2024-03-21 RX ADMIN — Medication 3 MG: at 21:12

## 2024-03-21 ASSESSMENT — PAIN SCALES - GENERAL
PAINLEVEL_OUTOF10: 0 - NO PAIN
PAINLEVEL_OUTOF10: 0 - NO PAIN

## 2024-03-21 ASSESSMENT — COGNITIVE AND FUNCTIONAL STATUS - GENERAL
TOILETING: A LITTLE
PERSONAL GROOMING: A LITTLE
STANDING UP FROM CHAIR USING ARMS: A LITTLE
DRESSING REGULAR LOWER BODY CLOTHING: A LITTLE
DAILY ACTIVITIY SCORE: 20
CLIMB 3 TO 5 STEPS WITH RAILING: A LOT
HELP NEEDED FOR BATHING: A LITTLE
WALKING IN HOSPITAL ROOM: A LITTLE
MOBILITY SCORE: 19
MOVING TO AND FROM BED TO CHAIR: A LITTLE

## 2024-03-21 ASSESSMENT — PAIN - FUNCTIONAL ASSESSMENT: PAIN_FUNCTIONAL_ASSESSMENT: 0-10

## 2024-03-21 NOTE — PROGRESS NOTES
"MSW received a message from Olivia Amezcua at [facility] who are the providers of pt oxygen.     Hello!   Pt has Home O2 with us ([facility]) however he has been non-compliant and not returning our calls.  We would like to be at his home  upon discharge to service his equipment and re educate him on his O2.  His insurance has also requested updated rx and pulse ox testing.  Can you assist?  Feel free to call me on my cell [phone] or [employer]:  '[facility]'    Thank You, Olivia Amezcua/[facility] \"    MSW called Olivia back and she will send a Haiku to pt NP along with needs for new orders and the details.     In order to make sure Sylvester can make contact with pt at dc contact Olivia Amezcua [phone] via cell or on [employer] \"[facility]\". Per Olivia if pt remains noncompliant and unable to get a hold of his insurance will stop paying for his home oxygen.    "

## 2024-03-21 NOTE — CARE PLAN
Problem: Respiratory  Goal: Clear secretions with interventions this shift  Outcome: Progressing  Goal: Minimize anxiety/maximize coping throughout shift  Outcome: Progressing  Goal: Minimal/no exertional discomfort or dyspnea this shift  Outcome: Progressing  Goal: Patent airway maintained this shift  Outcome: Progressing  Goal: Tolerate pulmonary toileting this shift  Outcome: Progressing

## 2024-03-21 NOTE — PROGRESS NOTES
Physical Therapy                 Therapy Communication Note    Patient Name: Reggie Munoz  MRN: [id number]  Today's Date: 3/21/2024     Discipline: Physical Therapy    Missed Visit Reason: Missed Visit Reason: Other (Comment) (pt off floor for CXR; cancel at this time.)    Missed Time: Cancel    Comment:

## 2024-03-21 NOTE — NURSING NOTE
Assumed care of pt. BSSR received from RN. Pt observed sitting up in chair at bedside , A&Ox3. Respiration regular and unlabored on 3 L of O2 via NC. No concerns or needs voiced at this time. Call light within reach. Bed in lowest position,locked. Will continue current plan of care and update as necessary.

## 2024-03-21 NOTE — CARE PLAN
Problem: Respiratory  Goal: Clear secretions with interventions this shift  Outcome: Progressing  Goal: Minimize anxiety/maximize coping throughout shift  Outcome: Progressing  Goal: Minimal/no exertional discomfort or dyspnea this shift  Outcome: Progressing  Goal: Patent airway maintained this shift  Outcome: Progressing  Goal: Tolerate mechanical ventilation evidenced by VS/agitation level this shift  Outcome: Progressing  Goal: Tolerate pulmonary toileting this shift  Outcome: Progressing  Goal: Verbalize decreased shortness of breath this shift  Outcome: Progressing  Goal: Wean oxygen to maintain O2 saturation per order/standard this shift  Outcome: Progressing  Goal: Increase self care and/or family involvement in next 24 hours  Outcome: Progressing

## 2024-03-21 NOTE — PROGRESS NOTES
03/21/24 0833   Discharge Planning   Who is requesting discharge planning? Provider   Home or Post Acute Services None   Patient expects to be discharged to: Home no needs   Does the patient need discharge transport arranged? No     Safe dc plan secured home no skilled needs-pt can go when dc.

## 2024-03-21 NOTE — PROGRESS NOTES
Occupational Therapy                 Therapy Communication Note    Patient Name: Reggie Munoz  MRN: [id number]  Today's Date: 3/21/2024     Discipline: Occupational Therapy    Missed Visit Reason: Missed Visit Reason: Cancel (Patient off the floor for chest XR.)    Missed Time: Cancel    Comment:

## 2024-03-21 NOTE — PROGRESS NOTES
"Reggie Munoz is a 73 y.o. male on day 3 of admission seen in follow-up for acute hypoxia, acute COPD exacerbation    Subjective   On 3 L nasal cannula oxygen; O2 sats 95%.  Creased to 2 L of my exam.  RN aware.  No respiratory complaints.  Denies pain.  Afebrile.       Objective     Physical Exam  Vitals and nursing note reviewed.   Constitutional:       Appearance: Normal appearance.   HENT:      Head: Normocephalic and atraumatic.      Nose: Nose normal.      Mouth/Throat:      Mouth: Mucous membranes are moist.   Eyes:      Extraocular Movements: Extraocular movements intact.      Conjunctiva/sclera: Conjunctivae normal.      Pupils: Pupils are equal, round, and reactive to light.   Cardiovascular:      Rate and Rhythm: Normal rate and regular rhythm.      Pulses: Normal pulses.      Heart sounds: Normal heart sounds.   Pulmonary:      Effort: Pulmonary effort is normal.      Breath sounds: Normal breath sounds.      Comments: Lungs diminished but clear.  Abdominal:      General: Bowel sounds are normal.      Palpations: Abdomen is soft.   Musculoskeletal:         General: Normal range of motion.   Skin:     General: Skin is warm and dry.      Capillary Refill: Capillary refill takes less than 2 seconds.   Neurological:      General: No focal deficit present.      Mental Status: He is alert and oriented to person, place, and time.   Psychiatric:         Mood and Affect: Mood normal.         Behavior: Behavior normal.         Last Recorded Vitals  Blood pressure 145/90, pulse 98, temperature 36.8 °C (98.2 °F), temperature source Oral, resp. rate 19, height 1.702 m (5' 7.01\"), weight 80.7 kg (177 lb 14.6 oz), SpO2 96 %.  Intake/Output last 3 Shifts:  I/O last 3 completed shifts:  In: 575 (7.1 mL/kg) [P.O.:575]  Out: - (0 mL/kg)   Weight: 80.7 kg       Intake/Output Summary (Last 24 hours) at 3/21/2024 1155  Last data filed at 3/21/2024 0930  Gross per 24 hour   Intake 600 ml   Output --   Net 600 ml      "   atorvastatin, 10 mg, oral, Nightly  budesonide, 0.5 mg, nebulization, BID  cholecalciferol, 2,000 Units, oral, Daily  digoxin, 125 mcg, oral, Daily  dilTIAZem CD, 180 mg, oral, Daily  furosemide, 40 mg, oral, Daily  ipratropium-albuteroL, 3 mL, nebulization, TID  melatonin, 3 mg, oral, Nightly  pantoprazole, 40 mg, oral, Daily   Or  pantoprazole, 40 mg, intravenous, Daily  polyethylene glycol, 17 g, oral, Daily  predniSONE, 20 mg, oral, BID with meals  warfarin, 2 mg, oral, Daily       PRN medications: acetaminophen **OR** acetaminophen **OR** acetaminophen, albuterol, ondansetron ODT **OR** ondansetron, oxygen   oxygen,        Relevant Results  Results for orders placed or performed during the hospital encounter of 03/18/24 (from the past 24 hour(s))   CBC   Result Value Ref Range    WBC 12.4 (H) 4.4 - 11.3 x10*3/uL    nRBC 0.0 0.0 - 0.0 /100 WBCs    RBC 5.02 4.50 - 5.90 x10*6/uL    Hemoglobin 14.7 13.5 - 17.5 g/dL    Hematocrit 45.5 41.0 - 52.0 %    MCV 91 80 - 100 fL    MCH 29.3 26.0 - 34.0 pg    MCHC 32.3 32.0 - 36.0 g/dL    RDW 13.5 11.5 - 14.5 %    Platelets 208 150 - 450 x10*3/uL   Basic metabolic panel   Result Value Ref Range    Glucose 122 (H) 65 - 99 mg/dL    Sodium 140 133 - 145 mmol/L    Potassium 4.2 3.4 - 5.1 mmol/L    Chloride 99 97 - 107 mmol/L    Bicarbonate 32 (H) 24 - 31 mmol/L    Urea Nitrogen 35 (H) 8 - 25 mg/dL    Creatinine 1.20 0.40 - 1.60 mg/dL    eGFR 65 >60 mL/min/1.73m*2    Calcium 8.9 8.5 - 10.4 mg/dL    Anion Gap 9 <=19 mmol/L   Protime-INR   Result Value Ref Range    Protime 21.0 (H) 9.3 - 12.7 seconds    INR 2.1 (H) 0.9 - 1.2   BLOOD GAS LACTIC ACID, VENOUS   Result Value Ref Range    POCT Lactate, Venous 2.9 (H) 0.4 - 2.0 mmol/L      ECG 12 lead  Result Date: 3/18/2024  Sinus tachycardia with occasional Premature ventricular complexes Possible Left atrial enlargement Borderline ECG When compared with ECG of 24-DEC-2023 10:07, Premature ventricular complexes are now present.    XR  chest 1 view  Result Date: 3/18/2024  CARDIOMEDIASTINAL SILHOUETTE: Cardiomediastinal silhouette is stable in size and configuration.   LUNGS: Low lung volumes with bronchovascular crowding. Hilar fullness with bilateral nonspecific interstitial opacities. No sizable pneumothorax. Small pleural effusions not excluded.   ABDOMEN: No remarkable upper abdominal findings.   BONES: No acute osseous changes.  1.  Findings suggestive of pulmonary vascular congestion with bibasilar atelectasis. Small pleural effusions or airspace opacities not excluded.           Impression  Acute hypoxic respiratory failure  Acute COPD exacerbation  Acute on chronic congestive heart failure  Paroxysmal atrial fibrillation  Chronic kidney disease stage II     Plan  Oxygen at baseline  Continue IBD/ICS  Continuous pulse oximetry  Incentive spirometry/pulmonary hygiene  Continue prednisone-discharge on taper  Oral Lasix  FEV1   Coumadin  GI prophylaxis  PT/OT/out of bed  Anticipate discharge tomorrow    Gita Redmond, APRN-CNP  [facility]

## 2024-03-21 NOTE — NURSING NOTE
"Notified attending physician sepsis \"watch\" alert received. No lactate levels available. WBC 12.4 trending down. Platelets within normal parameters. Afebrile, vital signs stable.  "

## 2024-03-21 NOTE — CARE PLAN
Problem: Pain  Goal: My pain/discomfort is manageable  Outcome: Progressing     Problem: Safety  Goal: Patient will be injury free during hospitalization  Outcome: Progressing  Goal: I will remain free of falls  Outcome: Progressing     Problem: Daily Care  Goal: Daily care needs are met  Outcome: Progressing     Problem: Psychosocial Needs  Goal: Demonstrates ability to cope with hospitalization/illness  Outcome: Progressing  Goal: Collaborate with me, my family, and caregiver to identify my specific goals  Outcome: Progressing     Problem: Discharge Barriers  Goal: My discharge needs are met  Outcome: Progressing     Problem: Respiratory  Goal: Clear secretions with interventions this shift  Outcome: Progressing  Goal: Minimize anxiety/maximize coping throughout shift  Outcome: Progressing  Goal: Minimal/no exertional discomfort or dyspnea this shift  Outcome: Progressing  Goal: Patent airway maintained this shift  Outcome: Progressing  Goal: Tolerate mechanical ventilation evidenced by VS/agitation level this shift  Outcome: Progressing  Goal: Tolerate pulmonary toileting this shift  Outcome: Progressing  Goal: Verbalize decreased shortness of breath this shift  Outcome: Progressing  Goal: Wean oxygen to maintain O2 saturation per order/standard this shift  Outcome: Progressing  Goal: Increase self care and/or family involvement in next 24 hours  Outcome: Progressing   The patient's goals for the shift include improve breathing    The clinical goals for the shift include remain fall free during shift    Over the shift, the patient did not make progress toward the following goals. Barriers to progression include . Recommendations to address these barriers include .

## 2024-03-21 NOTE — PROGRESS NOTES
Reggie Munoz is a 73 y.o. male on day 3 of admission presenting with COPD exacerbation (CMS/HCC).      Subjective   Patient seen and examined. Awake/alert/oriented. Remains on 3L oxygen via NC. Reports wheezing and moist non productive cough. Denies shortness of breath or chest pain. No nausea or vomiting.        Objective     Last Recorded Vitals  /90 (BP Location: Left arm, Patient Position: Sitting)   Pulse 98   Temp 36.8 °C (98.2 °F) (Oral)   Resp 19   Wt 80.7 kg (177 lb 14.6 oz)   SpO2 96%   Intake/Output last 3 Shifts:    Intake/Output Summary (Last 24 hours) at 3/21/2024 1210  Last data filed at 3/21/2024 0930  Gross per 24 hour   Intake 600 ml   Output --   Net 600 ml         Admission Weight  Weight: 80.7 kg (178 lb) (03/18/24 0438)    Daily Weight  03/18/24 : 80.7 kg (177 lb 14.6 oz)    Image Results  ECG 12 lead  Sinus tachycardia with occasional Premature ventricular complexes  Possible Left atrial enlargement  Borderline ECG  When compared with ECG of 24-DEC-2023 10:07,  Premature ventricular complexes are now Present  Confirmed by Joshua Lopez ([id number]) on 3/18/2024 7:39:25 PM  XR chest 1 view  Narrative: Interpreted By:  Tiffanie Gonzales,   STUDY:  XR CHEST 1 VIEW;  3/18/2024 5:11 am      INDICATION:  Signs/Symptoms:Shortness of breath.      COMPARISON:  12/24/2023      ACCESSION NUMBER(S):  [id number]      ORDERING CLINICIAN:  GARY MAE      FINDINGS:  AP radiograph of the chest was provided.              CARDIOMEDIASTINAL SILHOUETTE:  Cardiomediastinal silhouette is stable in size and configuration.      LUNGS:  Low lung volumes with bronchovascular crowding. Hilar fullness with  bilateral nonspecific interstitial opacities. No sizable  pneumothorax. Small pleural effusions not excluded.      ABDOMEN:  No remarkable upper abdominal findings.      BONES:  No acute osseous changes.      Impression: 1.  Findings suggestive of pulmonary vascular congestion with  bibasilar  atelectasis. Small pleural effusions or airspace opacities  not excluded.              MACRO:  None      Signed by: Tiffanie Gonzales 3/18/2024 5:58 AM  Dictation workstation:   [id number]      Physical Exam  Vitals reviewed.   Constitutional:       Appearance: Normal appearance.   HENT:      Head: Normocephalic and atraumatic.   Eyes:      Extraocular Movements: Extraocular movements intact.      Conjunctiva/sclera: Conjunctivae normal.   Cardiovascular:      Rate and Rhythm: Normal rate and regular rhythm.      Comments: BLE pitting edema  Pulmonary:      Effort: Pulmonary effort is normal.      Breath sounds: Wheezing and rhonchi present. No rales.   Abdominal:      General: Bowel sounds are normal.      Palpations: Abdomen is soft.      Tenderness: There is no abdominal tenderness.   Skin:     General: Skin is warm and dry.   Neurological:      General: No focal deficit present.      Mental Status: He is alert and oriented to person, place, and time.         Relevant Results  Lab Results   Component Value Date    GLUCOSE 122 (H) 03/21/2024    CALCIUM 8.9 03/21/2024     03/21/2024    K 4.2 03/21/2024    CO2 32 (H) 03/21/2024    CL 99 03/21/2024    BUN 35 (H) 03/21/2024    CREATININE 1.20 03/21/2024     Lab Results   Component Value Date    WBC 12.4 (H) 03/21/2024    HGB 14.7 03/21/2024    HCT 45.5 03/21/2024    MCV 91 03/21/2024     03/21/2024     ECG 12 lead    Result Date: 3/18/2024  Sinus tachycardia with occasional Premature ventricular complexes Possible Left atrial enlargement Borderline ECG When compared with ECG of 24-DEC-2023 10:07, Premature ventricular complexes are now Present Confirmed by Joshua Lopez ([id number]) on 3/18/2024 7:39:25 PM    XR chest 1 view    Result Date: 3/18/2024  Interpreted By:  Tiffanie Gonzales, STUDY: XR CHEST 1 VIEW;  3/18/2024 5:11 am   INDICATION: Signs/Symptoms:Shortness of breath.   COMPARISON: 12/24/2023   ACCESSION NUMBER(S): [id number]   ORDERING CLINICIAN:  GARY MAE   FINDINGS: AP radiograph of the chest was provided.       CARDIOMEDIASTINAL SILHOUETTE: Cardiomediastinal silhouette is stable in size and configuration.   LUNGS: Low lung volumes with bronchovascular crowding. Hilar fullness with bilateral nonspecific interstitial opacities. No sizable pneumothorax. Small pleural effusions not excluded.   ABDOMEN: No remarkable upper abdominal findings.   BONES: No acute osseous changes.       1.  Findings suggestive of pulmonary vascular congestion with bibasilar atelectasis. Small pleural effusions or airspace opacities not excluded.       MACRO: None   Signed by: Tiffanie Gonzales 3/18/2024 5:58 AM Dictation workstation:   [id number]           Assessment/Plan      Principal Problem:    COPD exacerbation (CMS/HCC)    Acute on chronic hypoxic respiratory failure  Secondary to COPD exacerbation  Patient is supposed to wear 2L at home but denies wearing it. Likely some underlying compliance issues  Consult pulmonology, appreciate recommendations  COVID/influenza negative  Prednisone 20mg BID  Aerosols PRN  Wean O2 as able-- currently at 3L  Will need oxygen cert prior to discharge     Leukocytosis- improving  Likely infectious vs steroid induced  Dispense report showing active script for prednisone 10mg daily per Dr. Kilgore  Unclear if patient is actually taking steroids daily  Patient did trigger sepsis alert on admission, lactic acid today 2.9  Will cover with doxycycline for possible acute bronchitis vs PNA  Repeat two view CXR today  Will check UA     Paroxsymal atrial fibrillation  Continue home digoxin and diltiazem  Continue warfarin  PT/INR daily     Chronic diastolic heart failure  Continue home lasix     CKD2  Baseline Cr 1.0-1.2  Currently stable at baseline    Plan  Continue oral steroids  IBD/ICS  Will start ATB  Repeat two view CXR and check UA  Supplemental oxygen  Pulm on consult, FEV 1 when optimized, appreciate recs  DVT prophylaxis:  Coumadin  PT/OT, recommended low intensity rehab  Patient declines HHC, he is agreeable to outpatient PT/OT at , will need script on discharge  CBC and BMP in AM  O2 cert prior to discharge     FULL CODE  POA: Velia Morrison, sister [phone]            Saima Galeana, APRN-CNP

## 2024-03-22 ENCOUNTER — APPOINTMENT (OUTPATIENT)
Dept: CARDIOLOGY | Facility: HOSPITAL | Age: 74
DRG: 190 | End: 2024-03-22
Payer: MEDICARE

## 2024-03-22 LAB
ANION GAP SERPL CALC-SCNC: 10 MMOL/L
AORTIC VALVE MEAN GRADIENT: 24.4 MMHG
AORTIC VALVE PEAK VELOCITY: 3.12 M/S
APPEARANCE UR: CLEAR
AV PEAK GRADIENT: 38.9 MMHG
AVA (PEAK VEL): 1.57 CM2
AVA (VTI): 1.55 CM2
BILIRUB UR STRIP.AUTO-MCNC: NEGATIVE MG/DL
BUN SERPL-MCNC: 28 MG/DL (ref 8–25)
CALCIUM SERPL-MCNC: 8.5 MG/DL (ref 8.5–10.4)
CHLORIDE SERPL-SCNC: 98 MMOL/L (ref 97–107)
CO2 SERPL-SCNC: 32 MMOL/L (ref 24–31)
COLOR UR: NORMAL
CREAT SERPL-MCNC: 1 MG/DL (ref 0.4–1.6)
EGFRCR SERPLBLD CKD-EPI 2021: 80 ML/MIN/1.73M*2
EJECTION FRACTION APICAL 4 CHAMBER: 64.8
EJECTION FRACTION: 65 %
ERYTHROCYTE [DISTWIDTH] IN BLOOD BY AUTOMATED COUNT: 13.7 % (ref 11.5–14.5)
GLUCOSE SERPL-MCNC: 136 MG/DL (ref 65–99)
GLUCOSE UR STRIP.AUTO-MCNC: NORMAL MG/DL
HCT VFR BLD AUTO: 45.1 % (ref 41–52)
HGB BLD-MCNC: 14.2 G/DL (ref 13.5–17.5)
INR PPP: 2.9 (ref 0.9–1.2)
KETONES UR STRIP.AUTO-MCNC: NEGATIVE MG/DL
LACTATE BLDV-SCNC: 2 MMOL/L (ref 0.4–2)
LEFT VENTRICLE INTERNAL DIMENSION DIASTOLE: 3.38 CM (ref 3.5–6)
LEFT VENTRICULAR OUTFLOW TRACT DIAMETER: 1.94 CM
LEUKOCYTE ESTERASE UR QL STRIP.AUTO: NEGATIVE
MCH RBC QN AUTO: 28.7 PG (ref 26–34)
MCHC RBC AUTO-ENTMCNC: 31.5 G/DL (ref 32–36)
MCV RBC AUTO: 91 FL (ref 80–100)
MITRAL VALVE E/A RATIO: 0.94
MITRAL VALVE E/E' RATIO: 10.17
NITRITE UR QL STRIP.AUTO: NEGATIVE
NRBC BLD-RTO: 0 /100 WBCS (ref 0–0)
PH UR STRIP.AUTO: 6.5 [PH]
PLATELET # BLD AUTO: 210 X10*3/UL (ref 150–450)
POTASSIUM SERPL-SCNC: 4.3 MMOL/L (ref 3.4–5.1)
PROT UR STRIP.AUTO-MCNC: NEGATIVE MG/DL
PROTHROMBIN TIME: 28.3 SECONDS (ref 9.3–12.7)
RBC # BLD AUTO: 4.94 X10*6/UL (ref 4.5–5.9)
RBC # UR STRIP.AUTO: NEGATIVE /UL
RIGHT VENTRICLE FREE WALL PEAK S': 16.42 CM/S
RIGHT VENTRICLE PEAK SYSTOLIC PRESSURE: 31 MMHG
SODIUM SERPL-SCNC: 140 MMOL/L (ref 133–145)
SP GR UR STRIP.AUTO: 1.02
TRICUSPID ANNULAR PLANE SYSTOLIC EXCURSION: 2.3 CM
UROBILINOGEN UR STRIP.AUTO-MCNC: NORMAL MG/DL
WBC # BLD AUTO: 11.9 X10*3/UL (ref 4.4–11.3)

## 2024-03-22 PROCEDURE — 9420000001 HC RT PATIENT EDUCATION 5 MIN

## 2024-03-22 PROCEDURE — 93306 TTE W/DOPPLER COMPLETE: CPT

## 2024-03-22 PROCEDURE — 80048 BASIC METABOLIC PNL TOTAL CA: CPT | Performed by: STUDENT IN AN ORGANIZED HEALTH CARE EDUCATION/TRAINING PROGRAM

## 2024-03-22 PROCEDURE — 97530 THERAPEUTIC ACTIVITIES: CPT | Mod: GO

## 2024-03-22 PROCEDURE — 85027 COMPLETE CBC AUTOMATED: CPT | Performed by: STUDENT IN AN ORGANIZED HEALTH CARE EDUCATION/TRAINING PROGRAM

## 2024-03-22 PROCEDURE — 85610 PROTHROMBIN TIME: CPT | Performed by: STUDENT IN AN ORGANIZED HEALTH CARE EDUCATION/TRAINING PROGRAM

## 2024-03-22 PROCEDURE — 97110 THERAPEUTIC EXERCISES: CPT | Mod: GP

## 2024-03-22 PROCEDURE — 81003 URINALYSIS AUTO W/O SCOPE: CPT | Performed by: NURSE PRACTITIONER

## 2024-03-22 PROCEDURE — 83605 ASSAY OF LACTIC ACID: CPT | Performed by: NURSE PRACTITIONER

## 2024-03-22 PROCEDURE — 2500000004 HC RX 250 GENERAL PHARMACY W/ HCPCS (ALT 636 FOR OP/ED): Performed by: STUDENT IN AN ORGANIZED HEALTH CARE EDUCATION/TRAINING PROGRAM

## 2024-03-22 PROCEDURE — 2500000001 HC RX 250 WO HCPCS SELF ADMINISTERED DRUGS (ALT 637 FOR MEDICARE OP): Performed by: NURSE PRACTITIONER

## 2024-03-22 PROCEDURE — 94640 AIRWAY INHALATION TREATMENT: CPT

## 2024-03-22 PROCEDURE — 36415 COLL VENOUS BLD VENIPUNCTURE: CPT | Performed by: STUDENT IN AN ORGANIZED HEALTH CARE EDUCATION/TRAINING PROGRAM

## 2024-03-22 PROCEDURE — 1210000001 HC SEMI-PRIVATE ROOM DAILY

## 2024-03-22 PROCEDURE — 97535 SELF CARE MNGMENT TRAINING: CPT | Mod: GO

## 2024-03-22 PROCEDURE — 2500000001 HC RX 250 WO HCPCS SELF ADMINISTERED DRUGS (ALT 637 FOR MEDICARE OP): Performed by: STUDENT IN AN ORGANIZED HEALTH CARE EDUCATION/TRAINING PROGRAM

## 2024-03-22 PROCEDURE — 2500000002 HC RX 250 W HCPCS SELF ADMINISTERED DRUGS (ALT 637 FOR MEDICARE OP, ALT 636 FOR OP/ED): Performed by: STUDENT IN AN ORGANIZED HEALTH CARE EDUCATION/TRAINING PROGRAM

## 2024-03-22 PROCEDURE — 97530 THERAPEUTIC ACTIVITIES: CPT | Mod: GP

## 2024-03-22 RX ADMIN — DIGOXIN 125 MCG: 125 TABLET ORAL at 08:34

## 2024-03-22 RX ADMIN — WARFARIN SODIUM 2 MG: 2 TABLET ORAL at 17:35

## 2024-03-22 RX ADMIN — PANTOPRAZOLE SODIUM 40 MG: 40 TABLET, DELAYED RELEASE ORAL at 08:33

## 2024-03-22 RX ADMIN — AMOXICILLIN AND CLAVULANATE POTASSIUM 1 TABLET: 875; 125 TABLET, FILM COATED ORAL at 06:19

## 2024-03-22 RX ADMIN — Medication 3 MG: at 20:41

## 2024-03-22 RX ADMIN — IPRATROPIUM BROMIDE AND ALBUTEROL SULFATE 3 ML: .5; 3 SOLUTION RESPIRATORY (INHALATION) at 12:11

## 2024-03-22 RX ADMIN — IPRATROPIUM BROMIDE AND ALBUTEROL SULFATE 3 ML: .5; 3 SOLUTION RESPIRATORY (INHALATION) at 07:25

## 2024-03-22 RX ADMIN — Medication 2000 UNITS: at 08:33

## 2024-03-22 RX ADMIN — BUDESONIDE INHALATION 0.5 MG: 0.5 SUSPENSION RESPIRATORY (INHALATION) at 07:25

## 2024-03-22 RX ADMIN — PREDNISONE 20 MG: 20 TABLET ORAL at 08:33

## 2024-03-22 RX ADMIN — PREDNISONE 20 MG: 20 TABLET ORAL at 17:35

## 2024-03-22 RX ADMIN — ATORVASTATIN CALCIUM 10 MG: 10 TABLET, FILM COATED ORAL at 20:41

## 2024-03-22 RX ADMIN — DILTIAZEM HYDROCHLORIDE 180 MG: 180 CAPSULE, COATED, EXTENDED RELEASE ORAL at 08:34

## 2024-03-22 RX ADMIN — BUDESONIDE INHALATION 0.5 MG: 0.5 SUSPENSION RESPIRATORY (INHALATION) at 20:44

## 2024-03-22 RX ADMIN — IPRATROPIUM BROMIDE AND ALBUTEROL SULFATE 3 ML: .5; 3 SOLUTION RESPIRATORY (INHALATION) at 20:44

## 2024-03-22 RX ADMIN — AMOXICILLIN AND CLAVULANATE POTASSIUM 1 TABLET: 875; 125 TABLET, FILM COATED ORAL at 17:35

## 2024-03-22 ASSESSMENT — PAIN SCALES - GENERAL
PAINLEVEL_OUTOF10: 0 - NO PAIN

## 2024-03-22 ASSESSMENT — COGNITIVE AND FUNCTIONAL STATUS - GENERAL
TOILETING: A LITTLE
CLIMB 3 TO 5 STEPS WITH RAILING: A LOT
DRESSING REGULAR LOWER BODY CLOTHING: A LITTLE
TOILETING: A LITTLE
PERSONAL GROOMING: A LITTLE
HELP NEEDED FOR BATHING: A LITTLE
WALKING IN HOSPITAL ROOM: A LITTLE
MOBILITY SCORE: 19
STANDING UP FROM CHAIR USING ARMS: A LITTLE
WALKING IN HOSPITAL ROOM: A LITTLE
PERSONAL GROOMING: A LITTLE
DAILY ACTIVITIY SCORE: 19
DAILY ACTIVITIY SCORE: 19
DRESSING REGULAR UPPER BODY CLOTHING: A LITTLE
DRESSING REGULAR UPPER BODY CLOTHING: A LITTLE
DRESSING REGULAR LOWER BODY CLOTHING: A LITTLE
MOVING TO AND FROM BED TO CHAIR: A LITTLE
HELP NEEDED FOR BATHING: A LITTLE
MOBILITY SCORE: 19
STANDING UP FROM CHAIR USING ARMS: A LITTLE
MOVING TO AND FROM BED TO CHAIR: A LITTLE
CLIMB 3 TO 5 STEPS WITH RAILING: A LOT

## 2024-03-22 ASSESSMENT — ACTIVITIES OF DAILY LIVING (ADL): HOME_MANAGEMENT_TIME_ENTRY: 15

## 2024-03-22 ASSESSMENT — PAIN - FUNCTIONAL ASSESSMENT
PAIN_FUNCTIONAL_ASSESSMENT: 0-10

## 2024-03-22 NOTE — PROGRESS NOTES
Occupational Therapy    Occupational Therapy Treatment    Name: Reggie Munoz  MRN: [id number]  : 1950  Date: 24  Time Calculation  Start Time: 1420  Stop Time: 1445  Time Calculation (min): 25 min    Assessment:  OT Assessment: Pt demonstrated good progress towards functional goals. Pt limited by impaired aerobic capacity and safety awareness. Pt would benefit from continued acute OT services.  Prognosis: Good  Evaluation/Treatment Tolerance: Patient tolerated treatment well  Medical Staff Made Aware: Yes  End of Session Communication: Bedside nurse  End of Session Patient Position: Up in chair, Alarm on (pt upset by alarm,, educated on falls risk)  Plan:  Treatment Interventions: ADL retraining, Functional transfer training, UE strengthening/ROM, Endurance training, Patient/family training, Neuromuscular reeducation, Compensatory technique education  OT Frequency: 4 times per week  OT Discharge Recommendations: Moderate intensity level of continued care  Equipment Recommended upon Discharge: Wheeled walker  OT Recommended Transfer Status: Stand by assist, Minimal assist, Assist of 1  OT - OK to Discharge: Yes    Subjective   Previous Visit Info:  OT Last Visit  OT Received On: 24  General:  General  Prior to Session Communication: Bedside nurse  Patient Position Received: Up in chair  Preferred Learning Style: verbal, visual  General Comment: cleared by nurse for therapy; pt agreeable to therapy.  Precautions:  Hearing/Visual Limitations: glasses  Medical Precautions: Fall precautions, Oxygen therapy device and L/min (2L O2)  Vitals:     Pain Assessment:  Pain Assessment  Pain Assessment: 0-10  Pain Score: 0 - No pain     Objective   Activities of Daily Living:      Grooming  Grooming Level of Assistance: Close supervision  Grooming Where Assessed: Standing sinkside  Grooming Comments: Pt able to brush teeth and wash face with close S for safety              UE Dressing  UE Dressing  Comments: pt declined    LE Dressing  LE Dressing: No  LE Dressing Comments: Pt declined    Toileting  Toileting Level of Assistance: Minimum assistance  Where Assessed: Toilet  Toileting Comments: assist for clothing management and cues for safety as pt does not place hands safely    Functional Standing Tolerance:     Bed Mobility/Transfers: Bed Mobility  Bed Mobility: No    Transfers  Transfer: Yes  Transfer 1  Transfer From 1: Sit to  Transfer to 1: Stand  Technique 1: Sit to stand  Transfer Level of Assistance 1: Contact guard  Trials/Comments 1: Cues for safe hand placement not heeded educated on safety  Transfers 2  Transfer From 2: Stand to  Transfer to 2: Toilet  Technique 2: Sit to stand, Stand to sit  Transfer Level of Assistance 2: Contact guard  Trials/Comments 2: cues for grab bar use and safety  Transfers 3  Transfer From 3: Stand to  Transfer to 3: Chair with arms  Technique 3: Stand to sit  Transfer Level of Assistance 3: Contact guard  Trials/Comments 3: cues for safe hand placement again      Functional Mobility:  Functional Mobility  Functional Mobility Performed: Yes  Functional Mobility 1  Surface 1: Level tile  Device 1: No device  Assistance 1: Contact guard  Comments 1: Pt performs functional mobility to and from bathroom in room with CGA. Pt required cues for safety as he attempts to push O2 tank and trips on O2 line      Outcome Measures:  [facility] Daily Activity  Putting on and taking off regular lower body clothing: A little  Bathing (including washing, rinsing, drying): A little  Putting on and taking off regular upper body clothing: A little  Toileting, which includes using toilet, bedpan or urinal: A little  Taking care of personal grooming such as brushing teeth: A little  Eating Meals: None  Daily Activity - Total Score: 19        Education Documentation  No documentation found.  Education Comments  No comments found.      Goals:  Encounter Problems       Encounter Problems (Active)        OT Goals       Patient will be able to complete all UB/LB ADL tasks of bathing, dressing, toileting and grooming with modified independence. (Progressing)       Start:  03/18/24    Expected End:  04/01/24            Patient will be able to complete all functional transfers/mobility with least restrictive device with modified independence using good safety and with G balance.  (Progressing)       Start:  03/18/24    Expected End:  04/01/24            Patient will be able to tolerate 10-15 min of functional standing with G balance in prep for ADL/transfers (Progressing)       Start:  03/18/24    Expected End:  04/01/24

## 2024-03-22 NOTE — PROGRESS NOTES
"Reggie Munoz is a 73 y.o. male on day 4 of admission seen in follow-up for acute hypoxia, acute COPD exacerbation    Subjective   Sitting up in chair.  On 2 L nasal cannula oxygen; O2 sats 97%.  Endorses a nonproductive cough only.  Denies pain.  Afebrile.       Objective     Physical Exam  Vitals and nursing note reviewed.   Constitutional:       Appearance: Normal appearance.   HENT:      Head: Normocephalic and atraumatic.      Nose: Nose normal.      Mouth/Throat:      Mouth: Mucous membranes are moist.   Eyes:      Extraocular Movements: Extraocular movements intact.      Conjunctiva/sclera: Conjunctivae normal.      Pupils: Pupils are equal, round, and reactive to light.   Cardiovascular:      Rate and Rhythm: Normal rate and regular rhythm.      Pulses: Normal pulses.      Heart sounds: Normal heart sounds.   Pulmonary:      Effort: Pulmonary effort is normal.      Comments: Coarse breath sounds bilaterally.  Abdominal:      General: Bowel sounds are normal.      Palpations: Abdomen is soft.   Musculoskeletal:         General: Normal range of motion.      Right lower leg: Edema present.      Left lower leg: Edema present.   Skin:     General: Skin is warm and dry.      Capillary Refill: Capillary refill takes less than 2 seconds.   Neurological:      General: No focal deficit present.      Mental Status: He is alert and oriented to person, place, and time.   Psychiatric:         Mood and Affect: Mood normal.         Behavior: Behavior normal.         Last Recorded Vitals  Blood pressure 147/90, pulse 79, temperature 36.3 °C (97.3 °F), temperature source Axillary, resp. rate 19, height 1.702 m (5' 7.01\"), weight 80.7 kg (177 lb 14.6 oz), SpO2 97 %.  Intake/Output last 3 Shifts:  I/O last 3 completed shifts:  In: 975 (12.1 mL/kg) [P.O.:475; IV Piggyback:500]  Out: - (0 mL/kg)   Weight: 80.7 kg       Intake/Output Summary (Last 24 hours) at 3/22/2024 1035  Last data filed at 3/21/2024 2015  Gross per 24 " hour   Intake 750 ml   Output --   Net 750 ml        amoxicillin-pot clavulanate, 1 tablet, oral, q12h LOKI  atorvastatin, 10 mg, oral, Nightly  budesonide, 0.5 mg, nebulization, BID  cholecalciferol, 2,000 Units, oral, Daily  digoxin, 125 mcg, oral, Daily  dilTIAZem CD, 180 mg, oral, Daily  [Held by provider] furosemide, 40 mg, oral, Daily  ipratropium-albuteroL, 3 mL, nebulization, TID  melatonin, 3 mg, oral, Nightly  pantoprazole, 40 mg, oral, Daily   Or  pantoprazole, 40 mg, intravenous, Daily  polyethylene glycol, 17 g, oral, Daily  predniSONE, 20 mg, oral, BID with meals  warfarin, 2 mg, oral, Daily       PRN medications: acetaminophen **OR** acetaminophen **OR** acetaminophen, albuterol, ondansetron ODT **OR** ondansetron, oxygen   oxygen,        Relevant Results  Results for orders placed or performed during the hospital encounter of 03/18/24 (from the past 24 hour(s))   Blood Gas Lactic Acid, Venous   Result Value Ref Range    POCT Lactate, Venous 3.0 (H) 0.4 - 2.0 mmol/L   Basic metabolic panel   Result Value Ref Range    Glucose 136 (H) 65 - 99 mg/dL    Sodium 140 133 - 145 mmol/L    Potassium 4.3 3.4 - 5.1 mmol/L    Chloride 98 97 - 107 mmol/L    Bicarbonate 32 (H) 24 - 31 mmol/L    Urea Nitrogen 28 (H) 8 - 25 mg/dL    Creatinine 1.00 0.40 - 1.60 mg/dL    eGFR 80 >60 mL/min/1.73m*2    Calcium 8.5 8.5 - 10.4 mg/dL    Anion Gap 10 <=19 mmol/L   Protime-INR   Result Value Ref Range    Protime 28.3 (H) 9.3 - 12.7 seconds    INR 2.9 (H) 0.9 - 1.2   CBC   Result Value Ref Range    WBC 11.9 (H) 4.4 - 11.3 x10*3/uL    nRBC 0.0 0.0 - 0.0 /100 WBCs    RBC 4.94 4.50 - 5.90 x10*6/uL    Hemoglobin 14.2 13.5 - 17.5 g/dL    Hematocrit 45.1 41.0 - 52.0 %    MCV 91 80 - 100 fL    MCH 28.7 26.0 - 34.0 pg    MCHC 31.5 (L) 32.0 - 36.0 g/dL    RDW 13.7 11.5 - 14.5 %    Platelets 210 150 - 450 x10*3/uL   BLOOD GAS LACTIC ACID, VENOUS   Result Value Ref Range    POCT Lactate, Venous 2.0 0.4 - 2.0 mmol/L   Transthoracic Echo  (TTE) Complete   Result Value Ref Range    BSA 1.95 m2      XR chest 2 views  Result Date: 3/21/2024  The cardiac silhouette is normal in appearance. There is a thoracic dextroscoliosis. Aorta is tortuous. Lungs appear to be mildly hyperinflated. There is right basilar subsegmental atelectasis. No infiltrates or effusions are identified. There is an exaggeration of the thoracic lumbar kyphosis secondary to multiple thoracic lumbar  compression fractures. There is blunting of the left costophrenic angle likely from pleural scarring. IMPRESSION: 1. Right basilar subsegmental atelectasis involving the right middle lobe. 2. Mild pulmonary hyperinflation is suspected with blunting left  costophrenic angle possibly secondary to pleural scarring. 3. Exaggeration of the thoracic lumbar kyphosis secondary to remote compression fractures.    ECG 12 lead  Result Date: 3/18/2024  Sinus tachycardia with occasional Premature ventricular complexes Possible Left atrial enlargement Borderline ECG When compared with ECG of 24-DEC-2023 10:07, Premature ventricular complexes are now present.    XR chest 1 view  Result Date: 3/18/2024  CARDIOMEDIASTINAL SILHOUETTE: Cardiomediastinal silhouette is stable in size and configuration.   LUNGS: Low lung volumes with bronchovascular crowding. Hilar fullness with bilateral nonspecific interstitial opacities. No sizable pneumothorax. Small pleural effusions not excluded.   ABDOMEN: No remarkable upper abdominal findings.   BONES: No acute osseous changes.  1.  Findings suggestive of pulmonary vascular congestion with bibasilar atelectasis. Small pleural effusions or airspace opacities not excluded.           Impression  Acute hypoxic respiratory failure  Acute COPD exacerbation  Acute on chronic congestive heart failure  Paroxysmal atrial fibrillation  Chronic kidney disease stage II     Plan  Oxygen at baseline  Home O2 certification prior to discharge-[facility] needs a new Rx  Continue  IBD/ICS  Continuous pulse oximetry  Incentive spirometry/pulmonary hygiene  Continue Augmentin  Sputum if able  Continue prednisone-discharge on taper  Lasix on hold  FEV1   2D echo pending  Coumadin  GI prophylaxis  PT/OT/out of bed  We will see as needed this weekend      SHAYY Ayoub-CNP  [facility]

## 2024-03-22 NOTE — PROGRESS NOTES
Reggie Munoz is a 73 y.o. male on day 4 of admission presenting with COPD exacerbation (CMS/HCC).      Subjective   Patient seen and examined. Awake/alert/oriented. On 2L oxygen via NC which appears to be his baseline. Still having harsh moist non productive cough. Denies shortness of breath or chest pain. No nausea or vomiting.        Objective     Last Recorded Vitals  /90 (BP Location: Left arm, Patient Position: Lying)   Pulse 79   Temp 36.3 °C (97.3 °F) (Axillary)   Resp 19   Wt 80.7 kg (177 lb 14.6 oz)   SpO2 97%   Intake/Output last 3 Shifts:    Intake/Output Summary (Last 24 hours) at 3/22/2024 1050  Last data filed at 3/21/2024 2015  Gross per 24 hour   Intake 750 ml   Output --   Net 750 ml         Admission Weight  Weight: 80.7 kg (178 lb) (03/18/24 0438)    Daily Weight  03/18/24 : 80.7 kg (177 lb 14.6 oz)    Image Results  XR chest 2 views  Narrative: Interpreted By:  Huy Humphreys,   STUDY:  XR CHEST 2 VIEWS; 3/21/2024 12:36 pm      INDICATION:  Signs/Symptoms:hypoxic respiratory failure. Cough. Shortness of  breath.      COMPARISON:  03/18/2024      ACCESSION NUMBER(S):  [id number]      ORDERING CLINICIAN:  ROSHAN MCQUEEN      TECHNIQUE:  PA and lateral views of the chest were obtained.      FINDINGS:  The cardiac silhouette is normal in appearance. There is a thoracic  dextroscoliosis. Aorta is tortuous. Lungs appear to be mildly  hyperinflated. There is right basilar subsegmental atelectasis. No  infiltrates or effusions are identified. There is an exaggeration of  the thoracic lumbar kyphosis secondary to multiple thoracic lumbar  compression fractures. There is blunting of the left costophrenic  angle likely from pleural scarring.      Impression: 1. Right basilar subsegmental atelectasis involving the right middle  lobe.  2. Mild pulmonary hyperinflation is suspected with blunting left  costophrenic angle possibly secondary to pleural scarring.  3. Exaggeration of the thoracic  no lesions, no deformities, no traumatic injuries, no significant scars are present, chest wall non-tender, no masses present, breathing is unlabored without accessory muscle use,normal breath sounds lumbar kyphosis secondary to remote  compression fractures.          MACRO:  none      Signed by: Huy Humphreys 3/21/2024 1:50 PM  Dictation workstation:   [id number]      Physical Exam  Vitals reviewed.   Constitutional:       Appearance: Normal appearance.   HENT:      Head: Normocephalic and atraumatic.   Eyes:      Extraocular Movements: Extraocular movements intact.      Conjunctiva/sclera: Conjunctivae normal.   Cardiovascular:      Rate and Rhythm: Normal rate and regular rhythm.      Comments: BLE pitting edema  Pulmonary:      Effort: Pulmonary effort is normal.      Breath sounds: Rhonchi present. No wheezing or rales.   Abdominal:      General: Bowel sounds are normal.      Palpations: Abdomen is soft.      Tenderness: There is no abdominal tenderness.   Skin:     General: Skin is warm and dry.   Neurological:      General: No focal deficit present.      Mental Status: He is alert and oriented to person, place, and time.         Relevant Results  Lab Results   Component Value Date    GLUCOSE 136 (H) 03/22/2024    CALCIUM 8.5 03/22/2024     03/22/2024    K 4.3 03/22/2024    CO2 32 (H) 03/22/2024    CL 98 03/22/2024    BUN 28 (H) 03/22/2024    CREATININE 1.00 03/22/2024     Lab Results   Component Value Date    WBC 11.9 (H) 03/22/2024    HGB 14.2 03/22/2024    HCT 45.1 03/22/2024    MCV 91 03/22/2024     03/22/2024     ECG 12 lead    Result Date: 3/18/2024  Sinus tachycardia with occasional Premature ventricular complexes Possible Left atrial enlargement Borderline ECG When compared with ECG of 24-DEC-2023 10:07, Premature ventricular complexes are now Present Confirmed by Joshua Lopez ([id number]) on 3/18/2024 7:39:25 PM    XR chest 1 view    Result Date: 3/18/2024  1.  Findings suggestive of pulmonary vascular congestion with bibasilar atelectasis. Small pleural effusions or airspace opacities not excluded.       MACRO: None   Signed by: Tiffanie Gonzales 3/18/2024 5:58 AM Dictation workstation:    [id number]           Assessment/Plan      Principal Problem:    COPD exacerbation (CMS/[facility])    COPD exacerbation, Acute bronchitis  Acute on chronic hypoxic respiratory failure  Now on 2L oxygen via NC per baseline. Contacted by [facility] regarding oxygen, needs updated oxygen certification and called prior to discharge to arrange to meet him at the house.   Consult pulmonology, appreciate recommendations  COVID/influenza negative  Prednisone 20mg BID  Aerosols PRN  Started on ATB  Sputum culture if able  Incentive Spirometer  Continue oxygen at 2L per baseline     Leukocytosis- improving  Likely infectious vs steroid induced  Dispense report showing active script for prednisone 10mg daily per Dr. Kilgore  Unclear if patient is actually taking steroids daily  Patient did trigger sepsis alert on admission, lactic acid  2.9, 3, 2.0  Given 500mL bolus fluid and started on ATB  CXR results as above  UA results pending RN to collect, did reach out to RN     Paroxsymal atrial fibrillation  Continue home digoxin and diltiazem  Continue warfarin  PT/INR daily     Chronic diastolic heart failure  Holding oral lasix for now  Echocardiogram pending  Echo 08/2023 showed normal EF     CKD2  Baseline Cr 1.0-1.2  Currently stable at baseline    Plan  Continue oral steroids  IBD/ICS  continue ATB, sputum culture if able  UA pending  Incentive spirometer  Supplemental oxygen  Pulm on consult, FEV 1 when optimized, appreciate recs  DVT prophylaxis: Coumadin  PT/OT, recommended low intensity rehab  Patient declines HHC, he is agreeable to outpatient PT/OT at , will need script on discharge  CBC and BMP in AM  O2 cert prior to discharge, need new script for [facility] to be contacted prior to discharge to arrange to meet patient at his house     FULL CODE  POA: Velia Morrison, sister [phone]            Saima Galeana, APRN-CNP

## 2024-03-22 NOTE — PROGRESS NOTES
Reggie Munoz is a 73 y.o. male on day 4 of admission presenting with COPD exacerbation (CMS/[facility]).    Patient is declining HHC.  He has home 02 through [facility], however patient has not returned calls to [place] and has been non-compliant with 02. [place] would like to make arrangements to be at patient's home when he is discharged to [facility] and make sure the equipment is in good working order. Insurance requested new 02 certification.      PLEASE NOTIFY OLIVIA JOHNSON AT [facility] PRIOR TO DISCHARGE SO THAT ARRANGEMENTS CAN BE MADE FOR [place] TO MEET PATIENT AT HOME.  OLIVIA CAN BE REACHED [phone] OR BY [employer].    Per Olivia, if patient remains non-complaint, his insurance will no longer cover his home oxygen.                    Larissa Crowder RN

## 2024-03-22 NOTE — PROGRESS NOTES
Physical Therapy    Physical Therapy Treatment    Patient Name: Reggie Munoz  MRN: [id number]  Today's Date: 3/22/2024  Time Calculation  Start Time: 1300  Stop Time: 1345  Time Calculation (min): 45 min       Assessment/Plan   PT Assessment  End of Session Communication: Bedside nurse  Assessment Comment: HAD CONVERSATION WITH  PATIENT AND SISTER REGARDING IMPROVING HOME SAFETY AND EASE OF MANUVERING AROUND HOUSE, OBTAINING A HOSPITAL BED ON MAIN FLOOR, FWW AND HOME o2 PER RESPIRATORY THERAPY  End of Session Patient Position: Up in chair, Alarm off, not on at start of session (sister present in room)  PT Plan  Inpatient/Swing Bed or Outpatient: Inpatient  PT Plan  Treatment/Interventions: Bed mobility, Transfer training, Gait training, Stair training, Balance training, Neuromuscular re-education, Strengthening, Endurance training, Therapeutic exercise, Therapeutic activity, Home exercise program  PT Plan: Skilled PT  PT Frequency: 4 times per week  PT Discharge Recommendations: Low intensity level of continued care  Equipment Recommended upon Discharge: Wheeled walker, Straight cane  PT Recommended Transfer Status: Stand by assist  PT - OK to Discharge: Yes      General Visit Information:   PT  Visit  PT Received On: 03/22/24  General  Prior to Session Communication: Bedside nurse  Patient Position Received: Up in chair  Preferred Learning Style: verbal, visual  General Comment: cleared by nurse for therapy; pt agreeable to therapy.    Subjective   Precautions:  Precautions  Hearing/Visual Limitations: glasses  Medical Precautions: Fall precautions, Oxygen therapy device and L/min (2 liters o2 via nc)  Vital Signs:  Vital Signs  Heart Rate: 94  SpO2: 92 %  Patient Position: Sitting    Objective   Pain:  Pain Assessment  Pain Assessment: 0-10  Pain Score: 0 - No pain  Cognition:  Cognition  Overall Cognitive Status: Within Functional Limits  Problem Solving: Exceptions to WFL  Safety/Judgement:  (decreased  safety insight during functional mobility)  Insight: Mild  Impulsive: Mildly  Postural Control:  Postural Control  Posture Comment: + mod to severe forward head, + mod T-spine kyphosis  Extremity/Trunk Assessments:    Activity Tolerance:  Activity Tolerance  Endurance: Decreased tolerance for upright activites  Activity Tolerance Comments: PT DEMO  Rate of Perceived Dyspnea (RPD): PT DEMONSTRATES IMPROVED STANDING TOLERANCE WITH USE OF fww  Treatments:  Therapeutic Exercise  Therapeutic Exercise Performed: Yes  Therapeutic Exercise Activity 1: seated renee AP/heel raises x 30 reps  Therapeutic Exercise Activity 2: seated renee LAQ's x 2 sets of 15 reps  Therapeutic Exercise Activity 3: seated renee marching x 2 sets of 15 reps  Therapeutic Exercise Activity 4: seated pursed lip/deep breathing x 3 sets of 5 reps    Therapeutic Activity  Therapeutic Activity Performed: Yes (see transfers, amb with and without FWW comments)    Ambulation/Gait Training  Ambulation/Gait Training Performed: Yes  Ambulation/Gait Training 1  Surface 1: Level tile  Device 1: No device  Assistance 1: Contact guard, Moderate verbal cues  Quality of Gait 1: Forward flexed posture, Narrow base of support, Decreased step length  Comments/Distance (ft) 1: pt amb 60 ft x 2 without device, + turns, contact guard of 1, verbal cues for pursed lip breathing, erect posture as able, increased step height; O2 sat 88% with HR 115bpm; pt recovered to 93% with seated pursed lip breathing x 40 secs.  Ambulation/Gait Training 2  Surface 2: Level tile  Device 2: Rolling walker  Assistance 2: Contact guard, Moderate verbal cues  Quality of Gait 2: Narrow base of support, Forward flexed posture  Comments/Distance (ft) 2: pt amb 120 ft x 2 with FWW, + turns, + seated rest break of 5 to 6 min ( PT had discussion with sister and pt at that time regarding home safety); verbal cues for erec tposture as able, staying close to frame of FWW and pursed lip breathing; o2 sat  dropped to 89% but increased to 94% with seate dpursed lip breathing x 15 secs. Balance good - with FWW; fair at best without FWW  Transfers  Transfer: Yes  Transfer 1  Transfer From 1: Sit to  Transfer to 1: Stand  Technique 1: Sit to stand  Transfer Level of Assistance 1: Close supervision, Minimal verbal cues  Trials/Comments 1: close supervision of 1 for trunk up, verbal cues for proper renee hand placement  Transfers 2  Transfer From 2: Stand to  Transfer to 2: Sit  Technique 2: Stand to sit  Transfer Level of Assistance 2: Close supervision, Minimal verbal cues  Trials/Comments 2: close supervision of 1 for trunk down, verbal cues for backing fully into chair, reaching back with both hands for arms of chair and NO SHUFFLING         Outcome Measures:  [facility] Basic Mobility  Turning from your back to your side while in a flat bed without using bedrails: None  Moving from lying on your back to sitting on the side of a flat bed without using bedrails: None  Moving to and from bed to chair (including a wheelchair): A little  Standing up from a chair using your arms (e.g. wheelchair or bedside chair): A little  To walk in hospital room: A little  Climbing 3-5 steps with railing: A lot  Basic Mobility - Total Score: 19    Education Documentation  No documentation found.  Education Comments  No comments found.        OP EDUCATION:       Encounter Problems       Encounter Problems (Active)       Mobility       STG - Patient will ambulate 100' with rolling walker and modified independence. (Progressing)       Start:  03/18/24    Expected End:  04/01/24            STG - Patient will ascend and descend a flight of stairs with use of one handrail and modified independence. (Progressing)       Start:  03/18/24    Expected End:  04/01/24               Safety       STG - Patient uses call light consistently to request assistance with transfers (Progressing)       Start:  03/18/24    Expected End:  04/01/24

## 2024-03-23 VITALS
HEIGHT: 67 IN | DIASTOLIC BLOOD PRESSURE: 96 MMHG | RESPIRATION RATE: 17 BRPM | TEMPERATURE: 97.7 F | HEART RATE: 96 BPM | WEIGHT: 177.91 LBS | SYSTOLIC BLOOD PRESSURE: 166 MMHG | OXYGEN SATURATION: 93 % | BODY MASS INDEX: 27.92 KG/M2

## 2024-03-23 PROBLEM — J96.11 CHRONIC RESPIRATORY FAILURE WITH HYPOXIA (MULTI): Status: ACTIVE | Noted: 2024-03-23

## 2024-03-23 PROBLEM — J40 BRONCHITIS: Status: ACTIVE | Noted: 2024-03-23

## 2024-03-23 LAB
ANION GAP SERPL CALC-SCNC: 11 MMOL/L
BUN SERPL-MCNC: 26 MG/DL (ref 8–25)
CALCIUM SERPL-MCNC: 8.6 MG/DL (ref 8.5–10.4)
CHLORIDE SERPL-SCNC: 97 MMOL/L (ref 97–107)
CO2 SERPL-SCNC: 31 MMOL/L (ref 24–31)
CREAT SERPL-MCNC: 1 MG/DL (ref 0.4–1.6)
EGFRCR SERPLBLD CKD-EPI 2021: 80 ML/MIN/1.73M*2
ERYTHROCYTE [DISTWIDTH] IN BLOOD BY AUTOMATED COUNT: 13.5 % (ref 11.5–14.5)
GLUCOSE SERPL-MCNC: 141 MG/DL (ref 65–99)
HCT VFR BLD AUTO: 45 % (ref 41–52)
HGB BLD-MCNC: 14.6 G/DL (ref 13.5–17.5)
HOLD SPECIMEN: NORMAL
INR PPP: 2.5 (ref 0.9–1.2)
MCH RBC QN AUTO: 29.7 PG (ref 26–34)
MCHC RBC AUTO-ENTMCNC: 32.4 G/DL (ref 32–36)
MCV RBC AUTO: 92 FL (ref 80–100)
NRBC BLD-RTO: 0 /100 WBCS (ref 0–0)
PLATELET # BLD AUTO: 222 X10*3/UL (ref 150–450)
POTASSIUM SERPL-SCNC: 4.9 MMOL/L (ref 3.4–5.1)
PROTHROMBIN TIME: 25.2 SECONDS (ref 9.3–12.7)
RBC # BLD AUTO: 4.92 X10*6/UL (ref 4.5–5.9)
SODIUM SERPL-SCNC: 139 MMOL/L (ref 133–145)
WBC # BLD AUTO: 12.3 X10*3/UL (ref 4.4–11.3)

## 2024-03-23 PROCEDURE — 97535 SELF CARE MNGMENT TRAINING: CPT | Mod: GO,CO

## 2024-03-23 PROCEDURE — 2500000002 HC RX 250 W HCPCS SELF ADMINISTERED DRUGS (ALT 637 FOR MEDICARE OP, ALT 636 FOR OP/ED): Performed by: STUDENT IN AN ORGANIZED HEALTH CARE EDUCATION/TRAINING PROGRAM

## 2024-03-23 PROCEDURE — 2500000001 HC RX 250 WO HCPCS SELF ADMINISTERED DRUGS (ALT 637 FOR MEDICARE OP): Performed by: STUDENT IN AN ORGANIZED HEALTH CARE EDUCATION/TRAINING PROGRAM

## 2024-03-23 PROCEDURE — 85610 PROTHROMBIN TIME: CPT | Performed by: NURSE PRACTITIONER

## 2024-03-23 PROCEDURE — 2500000001 HC RX 250 WO HCPCS SELF ADMINISTERED DRUGS (ALT 637 FOR MEDICARE OP): Performed by: NURSE PRACTITIONER

## 2024-03-23 PROCEDURE — 94761 N-INVAS EAR/PLS OXIMETRY MLT: CPT

## 2024-03-23 PROCEDURE — 36415 COLL VENOUS BLD VENIPUNCTURE: CPT | Performed by: NURSE PRACTITIONER

## 2024-03-23 PROCEDURE — 94640 AIRWAY INHALATION TREATMENT: CPT

## 2024-03-23 PROCEDURE — 97530 THERAPEUTIC ACTIVITIES: CPT | Mod: GO,CO

## 2024-03-23 PROCEDURE — 9420000001 HC RT PATIENT EDUCATION 5 MIN

## 2024-03-23 PROCEDURE — 80048 BASIC METABOLIC PNL TOTAL CA: CPT | Performed by: NURSE PRACTITIONER

## 2024-03-23 PROCEDURE — 85027 COMPLETE CBC AUTOMATED: CPT | Performed by: NURSE PRACTITIONER

## 2024-03-23 PROCEDURE — 2500000004 HC RX 250 GENERAL PHARMACY W/ HCPCS (ALT 636 FOR OP/ED): Performed by: STUDENT IN AN ORGANIZED HEALTH CARE EDUCATION/TRAINING PROGRAM

## 2024-03-23 RX ORDER — AMOXICILLIN AND CLAVULANATE POTASSIUM 875; 125 MG/1; MG/1
1 TABLET, FILM COATED ORAL EVERY 12 HOURS SCHEDULED
Qty: 10 TABLET | Refills: 0 | Status: SHIPPED | OUTPATIENT
Start: 2024-03-23 | End: 2024-04-01 | Stop reason: WASHOUT

## 2024-03-23 RX ORDER — PREDNISONE 10 MG/1
10 TABLET ORAL SEE ADMIN INSTRUCTIONS
Qty: 30 TABLET | Refills: 1 | Status: SHIPPED | OUTPATIENT
Start: 2024-03-23

## 2024-03-23 RX ORDER — AMOXICILLIN AND CLAVULANATE POTASSIUM 875; 125 MG/1; MG/1
1 TABLET, FILM COATED ORAL EVERY 12 HOURS SCHEDULED
Qty: 14 TABLET | Refills: 0 | Status: CANCELLED | OUTPATIENT
Start: 2024-03-23 | End: 2024-03-30

## 2024-03-23 RX ORDER — IPRATROPIUM BROMIDE AND ALBUTEROL SULFATE 2.5; .5 MG/3ML; MG/3ML
3 SOLUTION RESPIRATORY (INHALATION) EVERY 8 HOURS PRN
Qty: 270 ML | Refills: 1 | Status: SHIPPED | OUTPATIENT
Start: 2024-03-23

## 2024-03-23 RX ADMIN — IPRATROPIUM BROMIDE AND ALBUTEROL SULFATE 3 ML: .5; 3 SOLUTION RESPIRATORY (INHALATION) at 12:14

## 2024-03-23 RX ADMIN — PREDNISONE 20 MG: 20 TABLET ORAL at 08:09

## 2024-03-23 RX ADMIN — BUDESONIDE INHALATION 0.5 MG: 0.5 SUSPENSION RESPIRATORY (INHALATION) at 07:55

## 2024-03-23 RX ADMIN — DILTIAZEM HYDROCHLORIDE 180 MG: 180 CAPSULE, COATED, EXTENDED RELEASE ORAL at 08:09

## 2024-03-23 RX ADMIN — PANTOPRAZOLE SODIUM 40 MG: 40 TABLET, DELAYED RELEASE ORAL at 08:09

## 2024-03-23 RX ADMIN — Medication 2000 UNITS: at 08:09

## 2024-03-23 RX ADMIN — AMOXICILLIN AND CLAVULANATE POTASSIUM 1 TABLET: 875; 125 TABLET, FILM COATED ORAL at 05:03

## 2024-03-23 RX ADMIN — IPRATROPIUM BROMIDE AND ALBUTEROL SULFATE 3 ML: .5; 3 SOLUTION RESPIRATORY (INHALATION) at 07:55

## 2024-03-23 RX ADMIN — DIGOXIN 125 MCG: 125 TABLET ORAL at 08:09

## 2024-03-23 ASSESSMENT — COGNITIVE AND FUNCTIONAL STATUS - GENERAL
DAILY ACTIVITIY SCORE: 24
DAILY ACTIVITIY SCORE: 24
MOBILITY SCORE: 24

## 2024-03-23 ASSESSMENT — ACTIVITIES OF DAILY LIVING (ADL)
BATHING_LEVEL_OF_ASSISTANCE: CLOSE SUPERVISION
BATHING_WHERE_ASSESSED: EDGE OF BED
HOME_MANAGEMENT_TIME_ENTRY: 34

## 2024-03-23 ASSESSMENT — PAIN SCALES - GENERAL
PAINLEVEL_OUTOF10: 0 - NO PAIN
PAINLEVEL_OUTOF10: 0 - NO PAIN

## 2024-03-23 ASSESSMENT — PAIN - FUNCTIONAL ASSESSMENT
PAIN_FUNCTIONAL_ASSESSMENT: 0-10
PAIN_FUNCTIONAL_ASSESSMENT: 0-10

## 2024-03-23 NOTE — SIGNIFICANT EVENT
Pt evaluated for home O2 needs. Pt was 84% on room air at rest. Pt is 93% at rest on 2L. Pt ambulated on 2L. Spo2 remained at 92 with ambulation on the 2L. Pt will need 2L continuous

## 2024-03-23 NOTE — PROGRESS NOTES
Reggie Munoz is a 73 y.o. male on day 5 of admission presenting with COPD exacerbation (CMS/HCC).      Subjective   Patient seen and examined. Awake/alert/oriented. On 2L oxygen via [place] which appears to be his baseline. Still having harsh moist non productive cough. Denies shortness of breath or chest pain. No nausea or vomiting. Still having wheezing this morning.        Objective     Last Recorded Vitals  BP (!) 166/96 (BP Location: Left arm, Patient Position: Lying)   Pulse 96   Temp 36.5 °C (97.7 °F) (Oral)   Resp 17   Wt 80.7 kg (177 lb 14.6 oz)   SpO2 94%   Intake/Output last 3 Shifts:    Intake/Output Summary (Last 24 hours) at 3/23/2024 0843  Last data filed at 3/23/2024 0100  Gross per 24 hour   Intake --   Output 350 ml   Net -350 ml         Admission Weight  Weight: 80.7 kg (178 lb) (03/18/24 0438)    Daily Weight  03/18/24 : 80.7 kg (177 lb 14.6 oz)    Image Results  Transthoracic Echo (TTE) [place] [address]             Phone [phone]    TRANSTHORACIC ECHOCARDIOGRAM REPORT       Patient Name:      REGGIE MUNOZ    Reading Physician:    [id number] Lawson Middleton MD  Study Date:        3/22/2024             Ordering Provider:    [id number] ROSHAN MCQUEEN  MRN/PID:           [id number]              Fellow:  Accession#:        [id number]          Nurse:  Date of Birth/Age: 1950 / 73 years  Sonographer:          Syl Glaser  Gender:            M                     Additional Staff:     Lisy Eubanks RDCS  Height:            170.18 cm             Admit Date:  Weight:            80.29 kg              Admission Status:     Inpatient -                                                                 Routine  BSA / BMI:         1.92 m2  / 27.72 kg/m2 Department Location:  [facility]  Blood Pressure: 139 /59 mmHg    Study Type:    TRANSTHORACIC ECHO (TTE) COMPLETE  Diagnosis/ICD: Acute on chronic diastolic (congestive) heart failure                 (CHF)-I50.33  Indication:    Congestive Heart Failure  CPT Codes:     Echo Complete w Full [id number]    Patient History:  Pertinent History: Ch. pain, HTN, AFib, CHF, HLD, nonsustained superventricular                     tachy, COPD.    Study Detail: The following Echo studies were performed: 2D, M-Mode, Doppler and                color flow. Technically challenging study due to prominent lung                artifact and patient lying in supine position.       PHYSICIAN INTERPRETATION:  Left Ventricle: Left ventricular systolic function is normal, with an estimated ejection fraction of 60-65%. There are no regional wall motion abnormalities. The left ventricular cavity size is decreased. There is mild to moderate concentric left ventricular hypertrophy involving the septal wall. Spectral Doppler shows an impaired relaxation pattern of left ventricular diastolic filling.  Left Atrium: The left atrium is upper limits of normal in size.  Right Ventricle: The right ventricle is normal in size. There is normal right ventricular global systolic function.  Right Atrium: The right atrium is normal in size.  Aortic Valve: The aortic valve is trileaflet. The aortic valve dimensionless index is 0.53. There is no evidence of aortic valve regurgitation. The peak instantaneous gradient of the aortic valve is 38.9 mmHg. The mean gradient of the aortic valve is 24.4 mmHg.  Mitral Valve: The mitral valve is normal in structure. There is trace to mild mitral valve regurgitation.  Tricuspid Valve: The tricuspid valve is structurally normal. There is trace to mild tricuspid regurgitation. The Doppler estimated RVSP is within normal limits at 31.0 mmHg.  Pulmonic Valve: The pulmonic valve is not well visualized.  There is no indication of pulmonic valve regurgitation.  Pericardium: There is no pericardial effusion noted.  Aorta: The aortic root is normal.       CONCLUSIONS:   1. Left ventricular systolic function is normal with a 60-65% estimated ejection fraction.   2. Spectral Doppler shows an impaired relaxation pattern of left ventricular diastolic filling.   3. Trace to mild mitral valve regurgitation.   4. RVSP within normal limits.   5. Trace to mild tricuspid regurgitation.   6. Left ventricular cavity size is decreased.    QUANTITATIVE DATA SUMMARY:  2D MEASUREMENTS:                           Normal Ranges:  LAs:           3.90 cm   (2.7-4.0cm)  IVSd:          1.46 cm   (0.6-1.1cm)  LVPWd:         1.23 cm   (0.6-1.1cm)  LVIDd:         3.38 cm   (3.9-5.9cm)  LVIDs:         1.91 cm  LV Mass Index: 80.6 g/m2  LV % FS        43.4 %    LA VOLUME:                               Normal Ranges:  LA Vol A4C:       49.0 ml    (22+/-6mL/m2)  LA Vol Index A4C: 25.5 ml/m2  LA Vol A4C:       43.6 ml    RA VOLUME BY A/L METHOD:                                Normal Ranges:  RA Vol A4C:        20.4 ml    (8.3-19.5ml)  RA Vol Index A4C:  10.6 ml/m2  RA Area A4C:       9.8 cm2  RA Major Axis A4C: 4.0 cm    M-MODE MEASUREMENTS:               Normal Ranges:  LAs: 4.09 cm (2.7-4.0cm)    LV SYSTOLIC FUNCTION BY 2D PLANIMETRY (MOD):                      Normal Ranges:  EF-A4C View: 64.8 % (>=55%)  EF-A2C View: 63.6 %  EF-Biplane:  65.0 %    LV DIASTOLIC FUNCTION:                         Normal Ranges:  MV Peak E:    0.97 m/s (0.7-1.2 m/s)  MV Peak A:    1.03 m/s (0.42-0.7 m/s)  E/A Ratio:    0.94     (1.0-2.2)  MV e'         0.10 m/s (>8.0)  MV lateral e' 0.13 m/s  MV medial e'  0.06 m/s  E/e' Ratio:   10.17    (<8.0)    MITRAL VALVE:                  Normal Ranges:  MV DT: 196 msec (150-240msec)    AORTIC VALVE:                                     Normal Ranges:  AoV Vmax:                3.12 m/s  (<=1.7m/s)  AoV Peak P.9  mmHg (<20mmHg)  AoV Mean P.4 mmHg (1.7-11.5mmHg)  LVOT Max Pankaj:            1.66 m/s  (<=1.1m/s)  AoV VTI:                 60.59 cm  (18-25cm)  LVOT VTI:                31.94 cm  LVOT Diameter:           1.94 cm   (1.8-2.4cm)  AoV Area, VTI:           1.55 cm2  (2.5-5.5cm2)  AoV Area,Vmax:           1.57 cm2  (2.5-4.5cm2)  AoV Dimensionless Index: 0.53       RIGHT VENTRICLE:  RV Basal 3.33 cm  RV Major 7.7 cm  TAPSE:   23.4 mm  RV s'    0.16 m/s    TRICUSPID VALVE/RVSP:                              Normal Ranges:  Peak TR Velocity: 2.65 m/s  RV Syst Pressure: 31.0 mmHg (< 30mmHg)  IVC Diam:         1.92 cm    AORTA:  Asc Ao Diam 2.68 cm       [id number] Lawson Middleton MD  Electronically signed on 3/22/2024 at 7:04:59 PM       ** Final **      Physical Exam  Vitals reviewed.   Constitutional:       Appearance: Normal appearance.   HENT:      Head: Normocephalic and atraumatic.   Eyes:      Extraocular Movements: Extraocular movements intact.      Conjunctiva/sclera: Conjunctivae normal.   Cardiovascular:      Rate and Rhythm: Normal rate and regular rhythm.      Comments: BLE pitting edema  Pulmonary:      Effort: Pulmonary effort is normal.      Breath sounds: Wheezing and rhonchi present. No rales.   Abdominal:      General: Bowel sounds are normal.      Palpations: Abdomen is soft.      Tenderness: There is no abdominal tenderness.   Skin:     General: Skin is warm and dry.   Neurological:      General: No focal deficit present.      Mental Status: He is alert and oriented to person, place, and time.         Relevant Results  Lab Results   Component Value Date    GLUCOSE 141 (H) 2024    CALCIUM 8.6 2024     2024    K 4.9 2024    CO2 31 2024    CL 97 2024    BUN 26 (H) 2024    CREATININE 1.00 2024     Lab Results   Component Value Date    WBC 12.3 (H) 2024    HGB 14.6 2024    HCT 45.0 2024    MCV 92 2024     2024      ECG 12 lead    Result Date: 3/18/2024  Sinus tachycardia with occasional Premature ventricular complexes Possible Left atrial enlargement Borderline ECG When compared with ECG of 24-DEC-2023 10:07, Premature ventricular complexes are now Present Confirmed by Joshua Lopez ([id number]) on 3/18/2024 7:39:25 PM    XR chest 1 view    Result Date: 3/18/2024  1.  Findings suggestive of pulmonary vascular congestion with bibasilar atelectasis. Small pleural effusions or airspace opacities not excluded.       MACRO: None   Signed by: Tiffanie Gonzales 3/18/2024 5:58 AM Dictation workstation:   [id number]           Assessment/Plan      Principal Problem:    COPD exacerbation (CMS/[facility])    COPD exacerbation, Acute bronchitis  Acute on chronic hypoxic respiratory failure  Now on 2L oxygen via [place] per baseline. Contacted by [facility] regarding oxygen, needs updated oxygen certification and called prior to discharge to arrange to meet him at the house.   Consult pulmonology, appreciate recommendations  COVID/influenza negative  Prednisone 20mg BID  Aerosols PRN  Started on ATB  Sputum culture if able  Incentive Spirometer  Continue oxygen at 2L per baseline     Leukocytosis- improving  Likely infectious vs steroid induced  Dispense report showing active script for prednisone 10mg daily per Dr. Kilgore  Unclear if patient is actually taking steroids daily  Patient did trigger sepsis alert on admission, lactic acid  2.9, 3, 2.0  Given 500mL bolus fluid and started on ATB  CXR results as above  UA negative     Paroxsymal atrial fibrillation  Continue home digoxin and diltiazem  Continue warfarin  PT/INR daily     Chronic diastolic heart failure  Will resume lasix today  Echocardiogram pending  Echo 08/2023 showed normal EF     CKD2  Baseline Cr 1.0-1.2  Currently stable at baseline    Plan  Continue oral steroids and ATB  IBD/ICS  sputum culture if able  Incentive spirometer  Supplemental oxygen  Pulm on consult, FEV 1 when optimized,  appreciate recs  DVT prophylaxis: Coumadin  PT/OT, recommended low intensity rehab  Patient declines HHC, he is agreeable to outpatient PT/OT at , will need script on discharge  O2 cert prior to discharge, need new script for [facility] Hinds to be contacted prior to discharge to arrange to meet patient at his house    Anticipate discharge home today pending pulmonary clearance and oxygen certification     FULL CODE  POA: Velia Morrison, sister [phone]            Saima Galeana, APRN-CNP

## 2024-03-23 NOTE — PROGRESS NOTES
Pulmonary Progress Note 03/23/24     Patient seen in follow-up for COPD exacerbation. Chronic respiratory failure    Subjective   Interval History:   Case signed out to me by Dr. Kilgore  Patient tells me he is at his baseline.  Endorses a productive cough but feels as though he can manage at home    Objective   Vital signs in last 24 hours:  Temp:  [36.3 °C (97.3 °F)-36.6 °C (97.9 °F)] 36.5 °C (97.7 °F)  Heart Rate:  [87-96] 96  Resp:  [17-18] 17  BP: (136-166)/(69-96) 166/96  FiO2 (%):  [32 %] 32 %    Intake/Output last 3 shifts:  I/O last 3 completed shifts:  In: 500 (6.2 mL/kg) [IV Piggyback:500]  Out: 350 (4.3 mL/kg) [Urine:350 (0.1 mL/kg/hr)]  Weight: 80.7 kg   Intake/Output this shift:  I/O this shift:  In: 240 [P.O.:240]  Out: -     Physical Exam  Vitals reviewed.   Constitutional:       Interventions: Nasal cannula in place.      Comments: Chronically ill-appearing but no acute distress   HENT:      Head: Normocephalic and atraumatic.   Pulmonary:      Effort: Pulmonary effort is normal.      Comments: Scattered rhonchi which clear with coughing  Mild expiratory wheezes  Abdominal:      General: Abdomen is flat. Bowel sounds are normal.   Musculoskeletal:      Right lower leg: No edema.      Left lower leg: No edema.   Neurological:      General: No focal deficit present.      Mental Status: He is alert.   Psychiatric:         Mood and Affect: Mood normal.         Behavior: Behavior normal.         Scheduled medications  amoxicillin-pot clavulanate, 1 tablet, oral, q12h LOKI  atorvastatin, 10 mg, oral, Nightly  budesonide, 0.5 mg, nebulization, BID  cholecalciferol, 2,000 Units, oral, Daily  digoxin, 125 mcg, oral, Daily  dilTIAZem CD, 180 mg, oral, Daily  furosemide, 40 mg, oral, Daily  ipratropium-albuteroL, 3 mL, nebulization, TID  melatonin, 3 mg, oral, Nightly  pantoprazole, 40 mg, oral, Daily   Or  pantoprazole, 40 mg, intravenous, Daily  polyethylene glycol, 17 g, oral, Daily  predniSONE,  20 mg, oral, BID with meals  warfarin, 2 mg, oral, Daily      Continuous medications  oxygen,       PRN medications  PRN medications: acetaminophen **OR** acetaminophen **OR** acetaminophen, albuterol, ondansetron ODT **OR** ondansetron, oxygen     Labs:  Lab Results   Component Value Date     03/23/2024    K 4.9 03/23/2024    CL 97 03/23/2024    CO2 31 03/23/2024    BUN 26 (H) 03/23/2024    CREATININE 1.00 03/23/2024    GLUCOSE 141 (H) 03/23/2024    CALCIUM 8.6 03/23/2024     Lab Results   Component Value Date    WBC 12.3 (H) 03/23/2024    HGB 14.6 03/23/2024    HCT 45.0 03/23/2024    MCV 92 03/23/2024     03/23/2024       Imaging:  Transthoracic Echo (TTE) Complete    Result Date: 3/22/2024           [place] [address]            Phone [phone] TRANSTHORACIC ECHOCARDIOGRAM REPORT  Patient Name:      TREVON Anguiano Physician:    [id number] Lawson Middleton MD Study Date:        3/22/2024             Ordering Provider:    [id number] ROSHAN MCQUEEN MRN/PID:           [id number]              Fellow: Accession#:        [id number]          Nurse: Date of Birth/Age: 1950 / 73 years  Sonographer:          Syl Glaser Gender:            M                     Additional Staff:     Lisy Eubanks RDCS Height:            170.18 cm             Admit Date: Weight:            80.29 kg              Admission Status:     Inpatient -                                                                Routine BSA / BMI:         1.92 m2 / 27.72 kg/m2 Department Location:  [facility] Blood Pressure: 139 /59 mmHg Study Type:    TRANSTHORACIC ECHO (TTE) COMPLETE Diagnosis/ICD: Acute on chronic diastolic (congestive) heart failure                (CHF)-I50.33 Indication:     Congestive Heart Failure CPT Codes:     Echo Complete w Full Doppler-[id number] Patient History: Pertinent History: Ch. pain, HTN, AFib, CHF, HLD, nonsustained superventricular                    tachy, COPD. Study Detail: The following Echo studies were performed: 2D, M-Mode, Doppler and               color flow. Technically challenging study due to prominent lung               artifact and patient lying in supine position.  PHYSICIAN INTERPRETATION: Left Ventricle: Left ventricular systolic function is normal, with an estimated ejection fraction of 60-65%. There are no regional wall motion abnormalities. The left ventricular cavity size is decreased. There is mild to moderate concentric left ventricular hypertrophy involving the septal wall. Spectral Doppler shows an impaired relaxation pattern of left ventricular diastolic filling. Left Atrium: The left atrium is upper limits of normal in size. Right Ventricle: The right ventricle is normal in size. There is normal right ventricular global systolic function. Right Atrium: The right atrium is normal in size. Aortic Valve: The aortic valve is trileaflet. The aortic valve dimensionless index is 0.53. There is no evidence of aortic valve regurgitation. The peak instantaneous gradient of the aortic valve is 38.9 mmHg. The mean gradient of the aortic valve is 24.4 mmHg. Mitral Valve: The mitral valve is normal in structure. There is trace to mild mitral valve regurgitation. Tricuspid Valve: The tricuspid valve is structurally normal. There is trace to mild tricuspid regurgitation. The Doppler estimated RVSP is within normal limits at 31.0 mmHg. Pulmonic Valve: The pulmonic valve is not well visualized. There is no indication of pulmonic valve regurgitation. Pericardium: There is no pericardial effusion noted. Aorta: The aortic root is normal.  CONCLUSIONS:  1. Left ventricular systolic function is normal with a 60-65% estimated ejection fraction.  2. Spectral Doppler shows  an impaired relaxation pattern of left ventricular diastolic filling.  3. Trace to mild mitral valve regurgitation.  4. RVSP within normal limits.  5. Trace to mild tricuspid regurgitation.  6. Left ventricular cavity size is decreased. QUANTITATIVE DATA SUMMARY: 2D MEASUREMENTS:                          Normal Ranges: LAs:           3.90 cm   (2.7-4.0cm) IVSd:          1.46 cm   (0.6-1.1cm) LVPWd:         1.23 cm   (0.6-1.1cm) LVIDd:         3.38 cm   (3.9-5.9cm) LVIDs:         1.91 cm LV Mass Index: 80.6 g/m2 LV % FS        43.4 % LA VOLUME:                              Normal Ranges: LA Vol A4C:       49.0 ml    (22+/-6mL/m2) LA Vol Index A4C: 25.5 ml/m2 LA Vol A4C:       43.6 ml RA VOLUME BY A/L METHOD:                               Normal Ranges: RA Vol A4C:        20.4 ml    (8.3-19.5ml) RA Vol Index A4C:  10.6 ml/m2 RA Area A4C:       9.8 cm2 RA Major Axis A4C: 4.0 cm M-MODE MEASUREMENTS:              Normal Ranges: LAs: 4.09 cm (2.7-4.0cm) LV SYSTOLIC FUNCTION BY 2D PLANIMETRY (MOD):                     Normal Ranges: EF-A4C View: 64.8 % (>=55%) EF-A2C View: 63.6 % EF-Biplane:  65.0 % LV DIASTOLIC FUNCTION:                        Normal Ranges: MV Peak E:    0.97 m/s (0.7-1.2 m/s) MV Peak A:    1.03 m/s (0.42-0.7 m/s) E/A Ratio:    0.94     (1.0-2.2) MV e'         0.10 m/s (>8.0) MV lateral e' 0.13 m/s MV medial e'  0.06 m/s E/e' Ratio:   10.17    (<8.0) MITRAL VALVE:                 Normal Ranges: MV DT: 196 msec (150-240msec) AORTIC VALVE:                                    Normal Ranges: AoV Vmax:                3.12 m/s  (<=1.7m/s) AoV Peak P.9 mmHg (<20mmHg) AoV Mean P.4 mmHg (1.7-11.5mmHg) LVOT Max Pankaj:            1.66 m/s  (<=1.1m/s) AoV VTI:                 60.59 cm  (18-25cm) LVOT VTI:                31.94 cm LVOT Diameter:           1.94 cm   (1.8-2.4cm) AoV Area, VTI:           1.55 cm2  (2.5-5.5cm2) AoV Area,Vmax:           1.57 cm2  (2.5-4.5cm2) AoV  Dimensionless Index: 0.53  RIGHT VENTRICLE: RV Basal 3.33 cm RV Major 7.7 cm TAPSE:   23.4 mm RV s'    0.16 m/s TRICUSPID VALVE/RVSP:                             Normal Ranges: Peak TR Velocity: 2.65 m/s RV Syst Pressure: 31.0 mmHg (< 30mmHg) IVC Diam:         1.92 cm AORTA: Asc Ao Diam 2.68 cm  [id number] Lawson Middleton MD Electronically signed on 3/22/2024 at 7:04:59 PM  ** Final **     XR chest 2 views    Result Date: 3/21/2024  Interpreted By:  Huy Humphreys, STUDY: XR CHEST 2 VIEWS; 3/21/2024 12:36 pm   INDICATION: Signs/Symptoms:hypoxic respiratory failure. Cough. Shortness of breath.   COMPARISON: 03/18/2024   ACCESSION NUMBER(S): [id number]   ORDERING CLINICIAN: ROSHAN MCQUEEN   TECHNIQUE: PA and lateral views of the chest were obtained.   FINDINGS: The cardiac silhouette is normal in appearance. There is a thoracic dextroscoliosis. Aorta is tortuous. Lungs appear to be mildly hyperinflated. There is right basilar subsegmental atelectasis. No infiltrates or effusions are identified. There is an exaggeration of the thoracic lumbar kyphosis secondary to multiple thoracic lumbar compression fractures. There is blunting of the left costophrenic angle likely from pleural scarring.       1. Right basilar subsegmental atelectasis involving the right middle lobe. 2. Mild pulmonary hyperinflation is suspected with blunting left costophrenic angle possibly secondary to pleural scarring. 3. Exaggeration of the thoracic lumbar kyphosis secondary to remote compression fractures.     MACRO: none   Signed by: Huy Humphreys 3/21/2024 1:50 PM Dictation workstation:   [id number]    ECG 12 lead    Result Date: 3/18/2024  Sinus tachycardia with occasional Premature ventricular complexes Possible Left atrial enlargement Borderline ECG When compared with ECG of 24-DEC-2023 10:07, Premature ventricular complexes are now Present Confirmed by Joshua Lopez ([id number]) on 3/18/2024 7:39:25 PM    XR chest 1 view    Result Date:  3/18/2024  Interpreted By:  Tiffanie Gonzales, STUDY: XR CHEST 1 VIEW;  3/18/2024 5:11 am   INDICATION: Signs/Symptoms:Shortness of breath.   COMPARISON: 12/24/2023   ACCESSION NUMBER(S): [id number]   ORDERING CLINICIAN: GARY MAE   FINDINGS: AP radiograph of the chest was provided.       CARDIOMEDIASTINAL SILHOUETTE: Cardiomediastinal silhouette is stable in size and configuration.   LUNGS: Low lung volumes with bronchovascular crowding. Hilar fullness with bilateral nonspecific interstitial opacities. No sizable pneumothorax. Small pleural effusions not excluded.   ABDOMEN: No remarkable upper abdominal findings.   BONES: No acute osseous changes.       1.  Findings suggestive of pulmonary vascular congestion with bibasilar atelectasis. Small pleural effusions or airspace opacities not excluded.       MACRO: None   Signed by: Tiffanie Gonzales 3/18/2024 5:58 AM Dictation workstation:   [id number]    MR lumbar spine wo IV contrast    Result Date: 2/28/2024  Interpreted By:  Abdi Beckwith, STUDY: MR LUMBAR SPINE WO IV CONTRAST;  2/27/2024 6:06 pm   INDICATION: Signs/Symptoms:lumbar compression fractures.   COMPARISON: Lumbar spine radiograph 12/31/2023   ACCESSION NUMBER(S): [id number]   ORDERING CLINICIAN: MEG CAR   TECHNIQUE: Sagittal T1, T2, STIR, axial T1 and T2 weighted images of the lumbar spine were acquired.   FINDINGS: Alignment: Mild levoscoliosis and and reversal of the lumbar lordosis.   Vertebrae/Intervertebral Discs: Severe multilevel loss of disc height throughout the lumbar spine, secondary to multiple superior endplate compression deformities, most prominent at L4, with greater than 70% loss of disc height. No increased T2/stir signal to suggest recent fractures. The marrow signal is within normal limits. Multilevel intervertebral body disc irregularities, throughout the lumbar spine.   Conus: The lower thoracic cord appears unremarkable. The conus terminates at L2.   Advanced  multilevel degenerative change of the lumbar spine including endplate remodeling, vacuum phenomenon, Schmorl's nodes, ligamentum flavum hypertrophy and facet degenerative changes.   T11-T12: Small circumferential disc bulge, ligamentum flavum hypertrophy facet degenerative changes and 6 x 5 mm right medially directed synovial cyst (series 801, image 1) with moderate canal stenosis. Neural foramina are patent.   T12-L1: Disc bulge asymmetric to the right, ligamentum flavum hypertrophy and facet degenerative changes with moderate canal stenosis. Moderate bilateral neural foraminal narrowing.   L1-L2: Circumferential disc bulge, ligamentum flavum hypertrophy and facet degenerative change with severe canal stenosis. Mild left subarticular recess narrowing. Moderate bilateral neural foraminal narrowing.   L2-3: Circumferential disc bulge, ligamentum flavum hypertrophy and facet degenerative change with severe canal stenosis. Neural foramina are patent.   L3-4: Disc bulge slightly asymmetric to the left, ligamentum flavum hypertrophy and facet degenerative changes with moderate canal stenosis. Right-greater-than-left subarticular recess narrowing. Mild bilateral neural foraminal narrowing.   L4-5: Small circumferential disc bulge, facet degenerative changes and mild ligamentum flavum hypertrophy without significant canal stenosis. Right-greater-than-left moderate neural foraminal narrowing.   L5-S1: Circumferential disc bulge, facet degenerative changes and ligamentum flavum hypertrophy without canal stenosis. Mild bilateral neural foraminal narrowing.   The prevertebral and posterior paraspinous soft tissues are unremarkable.       1.  Severe multilevel compression deformities throughout the lumbar spine, most prominent at L4 with greater than 70% loss of disc height. 2. Advanced multilevel degenerative changes with up to severe canal stenosis and moderate neural foraminal narrowing most prominent at L1-L2 and L2-L3. 3.  Multilevel subarticular recess narrowing.   Signed by: Abdi Beckwith 2/28/2024 9:45 AM Dictation workstation:   [id number]              Assessment/Plan   Principal Problem:    COPD exacerbation (CMS/HCC)  Active Problems:    Bronchitis    Chronic respiratory failure with hypoxia (CMS/HCC)      Chronic respiratory failure with hypoxia  Continue with supplemental oxygen  COPD exacerbation: Resolving to resolved  Transition to oral prednisone with slow taper  Continue with baseline bronchodilator regimen on discharge  Acute bronchitis  Empiric antibiotics, chest x-ray on my review largely unremarkable  Pulmonary disposition  Subjectively feels that he can manage at home.  No absolute contraindications for discharge from my perspective would with advocate for early follow-up in the office    Discussed with hospitalist ASHVIN Wong MD  Pulmonary/ Medicine  [facility]     LOS: 5 days

## 2024-03-23 NOTE — CARE PLAN
Problem: Respiratory  Goal: Minimal/no exertional discomfort or dyspnea this shift  Outcome: Progressing     Problem: Respiratory  Goal: Verbalize decreased shortness of breath this shift  Outcome: Progressing

## 2024-03-23 NOTE — CARE PLAN
The patient's goals for the shift include improve breathing    The clinical goals for the shift include discharge home      Problem: Pain  Goal: My pain/discomfort is manageable  Outcome: Progressing     Problem: Safety  Goal: Patient will be injury free during hospitalization  Outcome: Progressing     Problem: Daily Care  Goal: Daily care needs are met  Outcome: Progressing     Problem: Psychosocial Needs  Goal: Demonstrates ability to cope with hospitalization/illness  Outcome: Progressing       Patient alert and oriented. Ambulatory in room. Denies pain.

## 2024-03-23 NOTE — PROGRESS NOTES
Occupational Therapy    OT Treatment    Patient Name: Reggie Munoz  MRN: [id number]  Today's Date: 3/23/2024  Time Calculation  Start Time: 1120  Stop Time: 1214  Time Calculation (min): 54 min         Assessment:  OT Assessment: Pt progressing with established POC limited by fatigue. Will continue with skilled therapeutic intervention to address remaining deficits  Prognosis: Good  Barriers to Discharge: Decreased caregiver support  Evaluation/Treatment Tolerance: Patient limited by fatigue  Medical Staff Made Aware: Yes  End of Session Communication: Bedside nurse  End of Session Patient Position: Up in chair, Alarm on (call light in reach all needs met)  OT Assessment Results: Decreased ADL status, Decreased upper extremity strength, Decreased safe judgment during ADL, Decreased endurance, Decreased cognition, Decreased functional mobility, Decreased IADLs  Prognosis: Good  Barriers to Discharge: Decreased caregiver support  Evaluation/Treatment Tolerance: Patient limited by fatigue  Medical Staff Made Aware: Yes  Strengths: Ability to acquire knowledge, Attitude of self, Premorbid level of function  Barriers to Participation: Comorbidities  Plan:  Treatment Interventions: ADL retraining, Functional transfer training, Endurance training, Patient/family training, Equipment evaluation/education, Compensatory technique education  OT Frequency: 4 times per week  OT Discharge Recommendations: Moderate intensity level of continued care  Equipment Recommended upon Discharge: Wheeled walker, Straight cane  OT Recommended Transfer Status: Stand by assist  OT - OK to Discharge: Yes  Treatment Interventions: ADL retraining, Functional transfer training, Endurance training, Patient/family training, Equipment evaluation/education, Compensatory technique education    Subjective   Previous Visit Info:  OT Last Visit  OT Received On: 03/23/24  General:  General  Reason for Referral: f/u for decline in ADLs due to COPD  exacerbation  Referred By: Dr. Viraj MD  Past Medical History Relevant to Rehab: HTN, Afib, COPD, CHF, macular degeneration, HLD, JACK, osteopenia, prostate CA.  Missed Visit: No  Family/Caregiver Present: No  Prior to Session Communication: Bedside nurse  Patient Position Received: Bed, 3 rail up, Alarm off, not on at start of session  Preferred Learning Style: verbal, visual  General Comment: Confered with NSG.  Pt agreeable to skilled therapeutic intervnetion.  Precautions:  Hearing/Visual Limitations: glasses  Medical Precautions: Fall precautions, Oxygen therapy device and L/min (2LO2 NC)  Vital Signs:  Vital Signs  SpO2: 93 % (throughout session)  Pain:  Pain Assessment  Pain Assessment: 0-10  Pain Score: 0 - No pain    Objective    Cognition:  Cognition  Overall Cognitive Status: Impaired  Orientation Level: Oriented X4  Following Commands: Follows one step commands with increased time  Safety Judgment: Decreased awareness of need for assistance  Attention: Within Functional Limits  Complex Functional Tasks: Minimal  Insight: Mild  Impulsive: Mildly  Coordination:  Movements are Fluid and Coordinated: Yes  Activities of Daily Living:      Grooming  Grooming Level of Assistance: Distant supervision  Grooming Where Assessed: Standing sinkside  Grooming Comments: Pt able to brush teeth and wash face with items in reach    UE Bathing  UE Bathing Level of Assistance: Setup  UE Bathing Where Assessed: Edge of bed  UE Bathing Comments: UB sponge bath    LE Bathing  LE Bathing Level of Assistance: Close supervision  LE Bathing Where Assessed: Edge of bed  LE Bathing Comments: sponge bathing instructed with use LH sponge    UE Dressing  UE Dressing Level of Assistance: Setup  UE Dressing Where Assessed: Edge of bed  UE Dressing Comments: Pt doff/don hospital gown    LE Dressing  LE Dressing: Yes  LE Dressing Adaptive Equipment: Sock aide, Reacher  Pants Level of Assistance: Close supervision (Pt thread seated  stance  with waist)  Sock Level of Assistance: Setup, Close supervision (instructed Pt wih use sock aid don B socks)  Shoe Level of Assistance: Setup, Close supervision (seated edge of bed Pt don B slippers)  LE Dressing Where Assessed: Edge of bed  LE Dressing Comments: introduction sock aid for energy conservation pt recptive handout issuance for procurement Pt receptive    Toileting  Toileting Comments: Pt declined  Functional Standing Tolerance:  Functional Standing Tolerance Comments: Pt declined  Bed Mobility/Transfers: Bed Mobility  Bed Mobility: Yes  Bed Mobility 1  Bed Mobility 1: Supine to sitting, Sitting to supine  Level of Assistance 1: Modified independent  Bed Mobility Comments 1: HOB to neutral    Transfers  Transfer: Yes  Transfer 1  Transfer From 1: Sit to  Transfer to 1: Stand  Technique 1: Sit to stand, Stand to sit  Transfer Level of Assistance 1: Modified independent, Distant supervision  Trials/Comments 1: edge of bed vc hand placement  Transfers 2  Transfer From 2: Bed to  Transfer to 2: Chair with arms  Technique 2: Sit to stand, Stand to sit  Transfer Level of Assistance 2: Distant supervision  Trials/Comments 2: vc hand placement    Toilet Transfers  Toilet Transfers Comments: Pt declined    Functional Mobility:  Functional Mobility  Functional Mobility Performed: Yes  Functional Mobility 1  Surface 1: Level tile  Device 1: No device  Assistance 1: Close supervision  Comments 1: 15' x 2  to include turns and backing  O2 cording in place    Therapy/Activity:      Therapeutic Activity  Therapeutic Activity Performed: Yes  Therapeutic Activity 1: Instructed Pt with energy conservation pronciples of pacing, grouping tasks, environmental adaptations, AE LB ADL skills, deep breathing  well received    Outcome Measures:[facility] Daily Activity  Putting on and taking off regular lower body clothing: None  Bathing (including washing, rinsing, drying): None  Putting on and taking off regular upper body  clothing: None  Toileting, which includes using toilet, bedpan or urinal: None  Taking care of personal grooming such as brushing teeth: None  Eating Meals: None  Daily Activity - Total Score: 24          OP EDUCATION:     Goals:  Encounter Problems       Encounter Problems (Active)       OT Goals       Patient will be able to complete all UB/LB ADL tasks of bathing, dressing, toileting and grooming with modified independence. (Progressing)       Start:  03/18/24    Expected End:  04/01/24            Patient will be able to complete all functional transfers/mobility with least restrictive device with modified independence using good safety and with G balance.  (Progressing)       Start:  03/18/24    Expected End:  04/01/24            Patient will be able to tolerate 10-15 min of functional standing with G balance in prep for ADL/transfers (Progressing)       Start:  03/18/24    Expected End:  04/01/24

## 2024-03-23 NOTE — CARE PLAN
Problem: Respiratory  Goal: Clear secretions with interventions this shift  Outcome: Progressing  Goal: Minimize anxiety/maximize coping throughout shift  Outcome: Progressing  Goal: Minimal/no exertional discomfort or dyspnea this shift  Outcome: Progressing  Goal: Patent airway maintained this shift  Outcome: Progressing  Goal: Tolerate mechanical ventilation evidenced by VS/agitation level this shift  Outcome: Progressing  Goal: Tolerate pulmonary toileting this shift  Outcome: Progressing  Goal: Verbalize decreased shortness of breath this shift  Outcome: Progressing  Goal: Increase self care and/or family involvement in next 24 hours  Outcome: Progressing

## 2024-03-23 NOTE — DISCHARGE SUMMARY
Discharge Diagnosis  COPD exacerbation (CMS/[facility])    Issues Requiring Follow-Up  Follow up with pulmonary outpatient. PT/OT outpatient    Test Results Pending At Discharge  Pending Labs       No current pending labs.            Hospital Course   Reggie Munoz is a 73 y.o. male hx of COPD, paroxsymal atrial fibrillation on warfarin, HTN, chronic diastolic heart failure, who presented with worsening shortness of breath over a few weeks. Reported coughing and increased wheezing as well. Stated that he was supposed to be on oxygen at home but is not using any. In the ED, vitals showed intermittent tachycardia and tachypnea. SpO2 94% on 3L NC. Labs remarkable for WBC 15.2, Cr 1.10. troponin 30. COVID/influenza negative. UA unremarkable. CXR showing pulmonary vascular congestion with bibasilar atelectasis. Given decadron 10mg x1 aspirin 325mg x1, duoneb, azithromycin x1, and 500cc NS bolus. Patient admitted to [facility] for further evaluation and treatment.     Pulmonary consulted. He was treated for COPD exacerbation, acute bronchitis, acute on chronic respiratory failure. He did have leukocytosis and elevated lactic. Repeat CXR showed right basilar subsegmental atelectasis involving the right middle lobe. Mild pulmonary hyperinflation is suspected with blunting left costophrenic angle possibly secondary to pleural scarring. Exaggeration of the thoracic lumbar kyphosis secondary to remote compression fractures. UA unremarkable. Started on ATB and given IV fluid bolus. IV Lasix was held for a period of time, now resumed. Lactic acid improved. WBC elevated in the setting of infection vs steroids. He has been being treated with IBD/ICS, prednisone taper. Weaned to 2L oxygen via NC per baseline. Patient does have CHF, echo showed normal EF. Digoxin, diltiazem, and coumadin continued for his A fib. INR has been therapeutic. He has CKD, renal function at baseline. Stable for discharge home. Oxygen cert prior to  discharge and need updated oxygen script sent to [facility]. This has been completed. RN to call [facility] prior to discharge. Pulm recommending FEV 1, can be done outpatient. Cleared by pulmonary for discharge. Continue ATB on discharge. PT/OT recommended low intensity rehab, patient agreeable to outpatient [facility] PT/OT, script sent. Stable for discharge home. Will need rapid follow up with pulmonary outpatient. Discussed with Dr. Wong and the patient.     Pertinent Physical Exam At Time of Discharge  Physical Exam  Vitals reviewed.   Constitutional:       Appearance: Normal appearance.   HENT:      Head: Normocephalic and atraumatic.   Eyes:      Extraocular Movements: Extraocular movements intact.      Conjunctiva/sclera: Conjunctivae normal.   Cardiovascular:      Rate and Rhythm: Normal rate and regular rhythm.      Comments: BLE pitting edema  Pulmonary:      Effort: Pulmonary effort is normal.      Breath sounds: Expiratory wheeze and scattered rhonchi  Abdominal:      General: Bowel sounds are normal.      Palpations: Abdomen is soft.      Tenderness: There is no abdominal tenderness.   Skin:     General: Skin is warm and dry.   Neurological:      General: No focal deficit present.      Mental Status: He is alert and oriented to person, place, and time.   Home Medications     Medication List      START taking these medications     amoxicillin-pot clavulanate 875-125 mg tablet; Commonly known as:   Augmentin; Take 1 tablet by mouth every 12 hours for 5 days.   ipratropium-albuteroL 0.5-2.5 mg/3 mL nebulizer solution; Commonly known   as: Duo-Neb; Take 3 mL by nebulization every 8 hours if needed for   wheezing or shortness of breath.   tiotropium 2.5 mcg/actuation inhaler; Commonly known as: Spiriva   Respimat; Inhale 2 puffs once daily. Do not start before November 4, 2023.     CHANGE how you take these medications     albuterol 90 mcg/actuation inhaler; Inhale 2 puffs every 6 hours if   needed for wheezing or  shortness of breath.; What changed: Another   medication with the same name was removed. Continue taking this   medication, and follow the directions you see here.   oxygen gas therapy; Commonly known as: O2; Inhale 1 each continuously.;   What changed: when to take this   predniSONE 10 mg tablet; Commonly known as: Deltasone; Take 1 tablet (10   mg) by mouth see administration instructions. Take 2 tablets BID x 5 days,   1 tablet BID x 5 days, then 1 tablet daily; What changed: See the new   instructions.     CONTINUE taking these medications     atorvastatin 10 mg tablet; Commonly known as: Lipitor; Take 1 tablet (10   mg) by mouth once daily at bedtime.   budesonide 0.5 mg/2 mL nebulizer solution; Commonly known as: Pulmicort;   Take 2 mL (0.5 mg) by nebulization 2 times a day. Rinse mouth with water   after use to reduce aftertaste and incidence of candidiasis. Do not   swallow.   cholecalciferol 50 MCG (2000 UT) tablet; Commonly known as: Vitamin D-3;   Take 1 tablet (50 mcg) by mouth once daily.   digoxin 125 MCG tablet; Commonly known as: Lanoxin; TAKE 1 TABLET BY   MOUTH DAILY   dilTIAZem  mg 24 hr capsule; Commonly known as: Cardizem CD; TAKE   1 CAPSULE BY MOUTH DAILY   furosemide 40 mg tablet; Commonly known as: Lasix; Take 1 tablet (40 mg)   by mouth once daily.   nebulizers misc; 1 Units every 6 hours if needed (SOB).   warfarin 2 mg tablet; Commonly known as: Coumadin; Take as directed. If   you are unsure how to take this medication, talk to your nurse or doctor.;   Original instructions: Take as directed per After Visit Summary. Do not   start before November 5, 2023.     STOP taking these medications     calcium citrate 200 mg (950 mg) tablet; Commonly known as: Calcitrate   ibuprofen 600 mg tablet   montelukast 10 mg tablet; Commonly known as: Singulair   traMADol 50 mg tablet; Commonly known as: Ultram   zoster vaccine-recombinant adjuvanted 50 mcg/0.5 mL vaccine; Commonly   known as:  Shingrix       Outpatient Follow-Up  Future Appointments   Date Time Provider Department Center   3/27/2024  2:00 PM YURY ACOAG NURSE [facility]   4/11/2024  1:45 PM Suresh Abad DO [id number] [facility]   4/15/2024  1:30 PM Rajwinder Dawson MD [id number] [facility]   4/15/2024  3:00 PM Cyrus Little MD [id number] [facility]   3/7/2025  2:00 PM Ranjit Puri MD [id number] [facility]     Time spent on discharge: 35 minutes    Saima Galeana, APRN-CNP

## 2024-03-25 ENCOUNTER — DOCUMENTATION (OUTPATIENT)
Dept: PRIMARY CARE | Facility: CLINIC | Age: 74
End: 2024-03-25
Payer: MEDICARE

## 2024-03-26 ENCOUNTER — PATIENT OUTREACH (OUTPATIENT)
Dept: PRIMARY CARE | Facility: CLINIC | Age: 74
End: 2024-03-26
Payer: MEDICARE

## 2024-03-26 NOTE — PROGRESS NOTES
Discharge facility: [facility]  Discharge diagnosis: COPD exacerbation   Admission date: 3/18/24  Discharge date: 3-23-24    PCP Appointment Date: 4-15-24, no sooner available appts, message to office  Specialist Appointment Date: 3-27-24  Hospital Encounter and Summary: Linked    See Discharge assessment below for further details    Engagement  Call Start Time: 0910 (3/26/2024  9:25 AM)    Medications  Medications reviewed with patient/caregiver?: Yes (3/26/2024  9:25 AM)  Is the patient having any side effects they believe may be caused by any medication additions or changes?: No (3/26/2024  9:25 AM)  Does the patient have all medications ordered at discharge?: Yes (3/26/2024  9:25 AM)  Care Management Interventions: Provided patient education (3/26/2024  9:25 AM)  Is the patient taking all medications as directed (includes completed medication regime)?: Yes (3/26/2024  9:25 AM)  Care Management Interventions: Provided patient education (3/26/2024  9:25 AM)  Medication Comments: Instructed on new/changed/disconintued medications of START taking these medications     amoxicillin-pot clavulanate 875-125 mg tablet; Commonly known as:   Augmentin; Take 1 tablet by mouth every 12 hours for 5 days.   ipratropium-albuteroL 0.5-2.5 mg/3 mL nebulizer solution; Commonly known   as: Duo-Neb; Take 3 mL by nebulization every 8 hours if needed for   wheezing or shortness of breath.   tiotropium 2.5 mcg/actuation inhaler; Commonly known as: Spiriva   Respimat; Inhale 2 puffs once daily. Do not start before November 4, 2023.     CHANGE how you take these medications     albuterol 90 mcg/actuation inhaler; Inhale 2 puffs every 6 hours if   needed for wheezing or shortness of breath.; What changed: Another   medication with the same name was removed. Continue taking this   medication, and follow the directions you see here.   oxygen gas therapy; Commonly known as: O2; Inhale 1 each continuously.;   What changed:  when to take this   predniSONE 10 mg tablet; Commonly known as: Deltasone; Take 1 tablet (10   mg) by mouth see administration instructions. Take 2 tablets BID x 5 days,   1 tablet BID x 5 days, then 1 tablet daily; What changed: See the new   instructions.STOP taking these medications     calcium citrate 200 mg (950 mg) tablet; Commonly known as: Calcitrate   ibuprofen 600 mg tablet   montelukast 10 mg tablet; Commonly known as: Singulair   traMADol 50 mg tablet; Commonly known as: Ultram (3/26/2024  9:25 AM)  Follow Up Tasks: -- (n/a) (3/26/2024  9:25 AM)    Appointments  Does the patient have a primary care provider?: Yes (3/26/2024  9:25 AM)  Care Management Interventions: Educated patient on importance of making appointment; Advised patient to make appointment (3/26/2024  9:25 AM)  Has the patient kept scheduled appointments due by today?: Yes (3/26/2024  9:25 AM)  Care Management Interventions: Advised to reschedule appointment (Current appt is for later in April) (3/26/2024  9:25 AM)  Follow Up Tasks: -- (n/a) (3/26/2024  9:25 AM)    Self Management  What is the home health agency?: none noted, message to Dr/office to inquire (3/26/2024  9:25 AM)  Has home health visited the patient within 72 hours of discharge?: Not applicable (3/26/2024  9:25 AM)  Home Health Interventions: -- (n/a) (3/26/2024  9:25 AM)  What Durable Medical Equipment (DME) was ordered?: n/a (3/26/2024  9:25 AM)  Has all Durable Medical Equipment (DME) been delivered?: -- (n/a) (3/26/2024  9:25 AM)  DME Interventions: -- (n/a) (3/26/2024  9:25 AM)  Care Management Interventions: Notified PCP/provider (3/26/2024  9:25 AM)  Follow Up Tasks: -- (n/a) (3/26/2024  9:25 AM)    Patient Teaching  Does the patient have access to their discharge instructions?: Yes (3/26/2024  9:25 AM)  Care Management Interventions: Reviewed instructions with patient (3/26/2024  9:25 AM)  What is the patient's perception of their health status since discharge?:  Improving (3/26/2024  9:25 AM)  Is the patient/caregiver able to teach back the hierarchy of who to call/visit for symptoms/problems? PCP, Specialist, Home Health nurse, Urgent Care, ED, 911: Yes (3/26/2024  9:25 AM)  Patient/Caregiver Education Comments: Instructed on hospital discharge instructions. Instructed on new and changed medications. Instructed if increased shortness of breath or chest pains to call 911. Provided my contact information and encouraged to call with any questions. (3/26/2024  9:25 AM)    Wrap Up  Is the patient/caregiver familiar with Advance Care Planning?: Yes (3/26/2024  9:25 AM)  Would the patient like more information on Advance Care Planning?: No (3/26/2024  9:25 AM)  Wrap Up Additional Comments: Patient reports his friend and sister help with managing his car. Call to patient's sister Velia to inquire furthr about nebulizer treatments as patient stated he gets confused sometimes on how to use them. Patient does not think home care was ordered. Message left for sister to inquire about home care. Message also to Dr to see inquire about referral for home care (3/26/2024  9:25 AM)

## 2024-03-27 ENCOUNTER — PATIENT OUTREACH (OUTPATIENT)
Dept: PRIMARY CARE | Facility: CLINIC | Age: 74
End: 2024-03-27
Payer: MEDICARE

## 2024-03-27 NOTE — PROGRESS NOTES
Received call back from patient's sister stating home care states that they did not receive a referral. Message back t 's office.

## 2024-03-27 NOTE — PROGRESS NOTES
Received call back from his sister. She reports that he does sometimes get confused. She does feel that he would benefit from home care services. She is also planning to call [facility] regarding his oxygen as she is uncertain if he will have enough to last until next delivery of oxygen.Encouraged he r to call if she has any further questions . Provided her with phne number to  home Care Team to call to inquire when they will be coming to see him.

## 2024-03-28 ENCOUNTER — ANTICOAGULATION - WARFARIN VISIT (OUTPATIENT)
Dept: CARDIOLOGY | Facility: HOSPITAL | Age: 74
End: 2024-03-28
Payer: MEDICARE

## 2024-03-28 DIAGNOSIS — I48.0 PAROXYSMAL ATRIAL FIBRILLATION (MULTI): Primary | ICD-10-CM

## 2024-04-01 ENCOUNTER — OFFICE VISIT (OUTPATIENT)
Dept: PRIMARY CARE | Facility: CLINIC | Age: 74
End: 2024-04-01
Payer: MEDICARE

## 2024-04-01 VITALS
SYSTOLIC BLOOD PRESSURE: 116 MMHG | OXYGEN SATURATION: 93 % | HEIGHT: 67 IN | TEMPERATURE: 97.9 F | DIASTOLIC BLOOD PRESSURE: 66 MMHG | BODY MASS INDEX: 26.84 KG/M2 | HEART RATE: 83 BPM | WEIGHT: 171 LBS | RESPIRATION RATE: 16 BRPM

## 2024-04-01 DIAGNOSIS — Z12.5 SCREENING FOR PROSTATE CANCER: ICD-10-CM

## 2024-04-01 DIAGNOSIS — R73.9 HYPERGLYCEMIA: ICD-10-CM

## 2024-04-01 DIAGNOSIS — J44.9 CHRONIC OBSTRUCTIVE PULMONARY DISEASE, UNSPECIFIED COPD TYPE (MULTI): Primary | ICD-10-CM

## 2024-04-01 DIAGNOSIS — R06.2 WHEEZING: ICD-10-CM

## 2024-04-01 DIAGNOSIS — I10 PRIMARY HYPERTENSION: ICD-10-CM

## 2024-04-01 DIAGNOSIS — I50.42 CHRONIC COMBINED SYSTOLIC AND DIASTOLIC CONGESTIVE HEART FAILURE (MULTI): ICD-10-CM

## 2024-04-01 DIAGNOSIS — I48.20 CHRONIC ATRIAL FIBRILLATION (MULTI): ICD-10-CM

## 2024-04-01 DIAGNOSIS — R00.2 PALPITATIONS: ICD-10-CM

## 2024-04-01 PROCEDURE — 1126F AMNT PAIN NOTED NONE PRSNT: CPT | Performed by: INTERNAL MEDICINE

## 2024-04-01 PROCEDURE — 3078F DIAST BP <80 MM HG: CPT | Performed by: INTERNAL MEDICINE

## 2024-04-01 PROCEDURE — 1159F MED LIST DOCD IN RCRD: CPT | Performed by: INTERNAL MEDICINE

## 2024-04-01 PROCEDURE — 1160F RVW MEDS BY RX/DR IN RCRD: CPT | Performed by: INTERNAL MEDICINE

## 2024-04-01 PROCEDURE — 1111F DSCHRG MED/CURRENT MED MERGE: CPT | Performed by: INTERNAL MEDICINE

## 2024-04-01 PROCEDURE — 99214 OFFICE O/P EST MOD 30 MIN: CPT | Performed by: INTERNAL MEDICINE

## 2024-04-01 PROCEDURE — 3074F SYST BP LT 130 MM HG: CPT | Performed by: INTERNAL MEDICINE

## 2024-04-01 PROCEDURE — 1157F ADVNC CARE PLAN IN RCRD: CPT | Performed by: INTERNAL MEDICINE

## 2024-04-01 RX ORDER — LOSARTAN POTASSIUM AND HYDROCHLOROTHIAZIDE 12.5; 5 MG/1; MG/1
1 TABLET ORAL DAILY
COMMUNITY
Start: 2024-03-15

## 2024-04-01 RX ORDER — WARFARIN 2 MG/1
2 TABLET ORAL NIGHTLY
Qty: 90 TABLET | Refills: 3 | Status: SHIPPED | OUTPATIENT
Start: 2024-04-01 | End: 2025-04-01

## 2024-04-01 RX ORDER — BUDESONIDE 0.25 MG/2ML
0.25 INHALANT ORAL
Qty: 360 ML | Refills: 3 | Status: SHIPPED | OUTPATIENT
Start: 2024-04-01 | End: 2025-04-01

## 2024-04-01 ASSESSMENT — ENCOUNTER SYMPTOMS
OCCASIONAL FEELINGS OF UNSTEADINESS: 0
DIZZINESS: 0
SHORTNESS OF BREATH: 1
DEPRESSION: 0
ARTHRALGIAS: 0
CONSTIPATION: 0
ABDOMINAL PAIN: 0
PALPITATIONS: 0
LOSS OF SENSATION IN FEET: 0
WHEEZING: 1
DIARRHEA: 0
NAUSEA: 0
COUGH: 1

## 2024-04-01 ASSESSMENT — PATIENT HEALTH QUESTIONNAIRE - PHQ9
SUM OF ALL RESPONSES TO PHQ9 QUESTIONS 1 AND 2: 0
1. LITTLE INTEREST OR PLEASURE IN DOING THINGS: NOT AT ALL
2. FEELING DOWN, DEPRESSED OR HOPELESS: NOT AT ALL

## 2024-04-01 ASSESSMENT — PAIN SCALES - GENERAL: PAINLEVEL: 0-NO PAIN

## 2024-04-01 NOTE — PROGRESS NOTES
"Subjective   Patient ID: Reggie Munoz is a 73 y.o. male who presents for follow up after hospitalization from 3/18-3/23. Patient presented to hospital for shortness of breath, cough, and wheezing. He was given prednisone and Augmentin. He finished antibiotic but still taking prednisone. Patient uses albuterol twice a day. Follows with pulmonologist, Dr. Jefferson and cardiologist, Dr. Abad. Requests refill of Coumadin. Takes water pill daily. Patient does not drive. Denies chest pain.    Diagnostics Reviewed:  Labs Reviewed: 3/2024 INR nml, CBC nml, glucose 141.         Review of Systems   Respiratory:  Positive for cough, shortness of breath and wheezing.    Cardiovascular:  Negative for chest pain and palpitations.   Gastrointestinal:  Negative for abdominal pain, constipation, diarrhea and nausea.   Musculoskeletal:  Negative for arthralgias.   Neurological:  Negative for dizziness.       Objective   /66   Pulse 83   Temp 36.6 °C (97.9 °F)   Resp 16   Ht 1.702 m (5' 7\")   Wt 77.6 kg (171 lb)   SpO2 93%   BMI 26.78 kg/m²     Physical Exam  HENT:      Right Ear: Tympanic membrane normal.      Left Ear: Tympanic membrane normal.      Mouth/Throat:      Pharynx: Oropharynx is clear. No oropharyngeal exudate.   Eyes:      Conjunctiva/sclera: Conjunctivae normal.      Pupils: Pupils are equal, round, and reactive to light.   Cardiovascular:      Rate and Rhythm: Normal rate and regular rhythm.      Heart sounds: Normal heart sounds.   Pulmonary:      Breath sounds: Normal breath sounds.   Abdominal:      Palpations: Abdomen is soft.      Tenderness: There is no abdominal tenderness.   Musculoskeletal:      Right lower le+ Edema present.      Left lower le+ Edema present.   Neurological:      Mental Status: He is oriented to person, place, and time.      Gait: Gait normal.   Psychiatric:         Mood and Affect: Mood normal.         Behavior: Behavior normal.         Assessment/Plan "   Diagnoses and all orders for this visit:  Chronic obstructive pulmonary disease, unspecified COPD type (CMS/HCC)  -     budesonide (Pulmicort) 0.25 mg/2 mL nebulizer solution; Take 2 mL (0.25 mg) by nebulization 2 times a day. Rinse mouth with water after use to reduce aftertaste and incidence of candidiasis. Do not swallow.  -     budesonide (Pulmicort) 180 mcg/actuation inhaler; Inhale 1 puff 2 times a day. Rinse mouth with water after use to reduce aftertaste and incidence of candidiasis. Do not swallow.  Wheezing  -     tiotropium (Spiriva Respimat) 2.5 mcg/actuation inhaler; Inhale 2 puffs once daily.  Palpitations  -     warfarin (Coumadin) 2 mg tablet; Take 1 tablet (2 mg) by mouth once daily at bedtime. Take as directed per After Visit Summary.  Chronic atrial fibrillation (CMS/HCC)  Chronic combined systolic and diastolic congestive heart failure (CMS/HCC)  Primary hypertension  -     Lipid Panel; Future  Hyperglycemia  -     POCT glycosylated hemoglobin (Hb A1C) manually resulted  Screening for prostate cancer  -     Prostate Specific Antigen, Screen; Future      Scribe Attestation  By signing my name below, IAmarilis Scribe   attest that this documentation has been prepared under the direction and in the presence of Rajwinder Dawson MD.

## 2024-04-09 ENCOUNTER — PATIENT OUTREACH (OUTPATIENT)
Dept: PRIMARY CARE | Facility: CLINIC | Age: 74
End: 2024-04-09
Payer: MEDICARE

## 2024-04-09 NOTE — PROGRESS NOTES
Call regarding appt. with PCP on 4/1/24 after hospitalization. Spoke with patient's sister. She reports she does not think patient has started receiving home care yet. At time of outreach , his sister feels as though call the patient feels as if their condition has improved .   Reviewed the PCP appointment with the pt's sister and addressed any questions or concerns.   Message to  home care regarding referral placed. Referral was noted as incomplete and needs Dr signature and diagnosis. Message to Dr's office.

## 2024-04-10 ENCOUNTER — HOME HEALTH ADMISSION (OUTPATIENT)
Dept: HOME HEALTH SERVICES | Facility: HOME HEALTH | Age: 74
End: 2024-04-10
Payer: MEDICARE

## 2024-04-10 ENCOUNTER — DOCUMENTATION (OUTPATIENT)
Dept: HOME HEALTH SERVICES | Facility: HOME HEALTH | Age: 74
End: 2024-04-10

## 2024-04-10 ENCOUNTER — TELEMEDICINE (OUTPATIENT)
Dept: PHARMACY | Facility: HOSPITAL | Age: 74
End: 2024-04-10
Payer: MEDICARE

## 2024-04-10 DIAGNOSIS — J44.9 CHRONIC OBSTRUCTIVE PULMONARY DISEASE, UNSPECIFIED COPD TYPE (MULTI): ICD-10-CM

## 2024-04-10 DIAGNOSIS — I50.32 CHRONIC DIASTOLIC CONGESTIVE HEART FAILURE (MULTI): ICD-10-CM

## 2024-04-10 DIAGNOSIS — I50.32 CHRONIC DIASTOLIC CONGESTIVE HEART FAILURE (MULTI): Primary | ICD-10-CM

## 2024-04-10 NOTE — Clinical Note
Dr. Dawson- patient needs assistance putting together his nebulizer machine at home. Is there anyone available (home health?) that could do this for the patient?

## 2024-04-10 NOTE — HH CARE COORDINATION
Home Care received a Referral for Nursing, Physical Therapy, Occupational Therapy, and Home Health Aide. We have processed the referral for a Start of Care on 04/11-04/12.     If you have any questions or concerns, please feel free to contact us at [phone]. Follow the prompts, enter your five digit zip code, and you will be directed to your care team on EAST 1.

## 2024-04-10 NOTE — PROGRESS NOTES
Pharmacy Post-Discharge Visit  Reggie Munoz is a 73 y.o. male who was referred to the Clinical Pharmacy Team to complete a post-discharge medication optimization and monitoring visit.  The patient was referred for their COPD and Congestive Heart Failure.    Recent Hospitalization  Admission Date: 3/18/24  Discharge Date: 3/23/24  Discharge Diagnosis: COPD exacerbation    Referring Provider: Dr. Rajwinder Dawson    Preferred Pharmacy  [facility] [address] [place] [address] RD AT [facility] [place]  [address] [place] [address] RD  [facility] [address]  Phone: [phone] Fax: [phone]    No Known Allergies    Notable Medication changes following discharge:  Start:   Augmentin 875/125 1 tab Q12 x5 days  Duonebs Q8 PRN  Spiriva 2 puffs QD  Change:   N/A  Hold:  N/A  Stop:   Calcium citrate  Ibupofen  Montelukast  Tramadol    COPD ASSESSMENT    Current Medication Regimen  Spiriva 2.5 mcg/act, 2 puffs BID- not taking?  Pulmicort 180 mcg/act, 1 puff BID  Albuterol HFA PRN  DuoNebs PRN  Oxygen, 2L continuous    Inhaler Use:  How many times per week do you use your rescue inhaler? Usually twice daily (part of a routine)  How many days per week do you remember to use your maintenance inhaler? Unknown  Do you often miss the second dose (if applicable) of your maintenance inhaler? Unknown    Inhaler Technique:  How do you use your rescue inhaler? 2 times daily  How do you use your maintenance inhaler? N/A    Secondary Prevention (vaccines):  Influenza: Date [Unknown]  PCV13: Date [9/7/18]  PPSV23: Date [10/20/16]    Symptom Management:  Increased cough? no  Increased sputum production? no  Increased SOB? no    Exacerbation History:  When was your last hospitalization for an exacerbation? 3/2024  When was the last time you were treated with antibiotics and/or steroids? Prednisone taken 3/2024 after discharge    Smoking Cessation  Never      CHF ASSESSMENT    Staging:  Most recent ejection fraction: 60-65%  as of 3/22/24    Symptoms/Monitoring:  Reported baseline weight: 171 lbs  Weighing self everyday? Not currently- recommended  Weight changes? Recently lost about ~10 pounds over a gradual period of time  Edema? Does report some swelling in feet- reports   Aware to contact cardiology if gaining 2-3 lbs/24 hrs OR 5 lbs/week? Yes  Shortness of breath? Difficult to determine, concurrent diagnosis of COPD    Diet/Lifestyle:  Following low-salt diet? No  Monitoring liquid intake? No    Guideline-Directed Medical Therapy:  ARNI: No  If no, then ACEi/ARB?: Yes, however, not taking  Beta Blocker: No  MRA: No  SGLT2i: No  Furosemide 40 mg daily  Diltiazem  mg daily  Digoxin 125 mcg daily  Blood Pressure: Appears decently controlled upon review of vitals, diastolic appears consistently lower in the 60s-80s. Denies any dizziness or symptoms of orthostatic hypotension    Secondary Prevention:  The 10-year ASCVD risk score (Varsha PENN, et al., 2019) is: 15.1%    Values used to calculate the score:      Age: 73 years      Sex: Male      Is Non- : No      Diabetic: No      Tobacco smoker: No      Systolic Blood Pressure: 116 mmHg      Is BP treated: Yes      HDL Cholesterol: 66 MG/DL      Total Cholesterol: 155 MG/DL   Aspirin 81mg? no  Statin?: No, most recent LDL 58 mg/dL as of 2/14/22  HTN?: Yes, describe: controlled    Historical Pharmacotherapy  Cannot recall previous pulmonary medications    Laboratory Results  Lab Results   Component Value Date    BILITOT 0.5 03/18/2024    CALCIUM 8.6 03/23/2024    CO2 31 03/23/2024    CL 97 03/23/2024    CREATININE 1.00 03/23/2024    GLUCOSE 141 (H) 03/23/2024    ALKPHOS 113 03/18/2024    K 4.9 03/23/2024    PROT 6.6 03/18/2024     03/23/2024    AST 27 03/18/2024    ALT 26 03/18/2024    BUN 26 (H) 03/23/2024    ANIONGAP 11 03/23/2024    MG 1.90 10/31/2023     (H) 06/28/2020    ALBUMIN 3.8 03/18/2024    LIPASE <16 (L) 12/31/2023    EGFR 80 03/23/2024  "    Lab Results   Component Value Date    TRIG 155 (H) 02/14/2022    CHOL 155 02/14/2022    HDL 66 02/14/2022     No results found for: \"HGBA1C\"      Assessment/Plan   Problem List Items Addressed This Visit       Chronic obstructive pulmonary disease (CMS/[facility])     General Patient Discussion  COPD  Multiple inhalers/nebulized medications  Patient appears unclear on which inhalers he should be using- is prescribed both Pulmicort and Spiriva, but is not using Spiriva. Both have recently been prescribed, started Pulmicort today 4/10/24.  Upon review of eosinophils absolute (480 cells/uL as of 3/18/24), would be good candidate for Trelegy per recent hospitalization for an exacerbation--will connect with patient's pulmonologist to determine if clinically appropriate.   Patient has a nebulizer machine, but does not know how to put together. Needs someone to put it together for him.  HF  EF appears excellent at estimated 60-65%  Upon review of vitals, diastolic BP appears to run lower, around 60s-80s    Plan/Changes   Continue all meds under the continuation of care with the referring provider and clinical pharmacy team  Specialists: Patient currently sees outpatient  Pulmonology (Dr. Deon Jefferson, sees about every 3-6 months),  cardiology (Dr. Suresh Abad).  Needs assistance putting together nebulizer machine  In-basket message Dr. Jefferson    Next PCP Follow-Up  4/15/24    Next Clinical Pharmacy Follow-Up  Thursday, April 18th, 11:30  Updates from pulmonology      Olga Lidia Henderson PharmD     Verbal consent to manage patient's drug therapy was obtained from the patient. They were informed they may decline to participate or withdraw from participation in pharmacy services at any time.  "

## 2024-04-11 ENCOUNTER — OFFICE VISIT (OUTPATIENT)
Dept: CARDIOLOGY | Facility: CLINIC | Age: 74
End: 2024-04-11
Payer: MEDICARE

## 2024-04-11 ENCOUNTER — ANTICOAGULATION - WARFARIN VISIT (OUTPATIENT)
Dept: CARDIOLOGY | Facility: HOSPITAL | Age: 74
End: 2024-04-11
Payer: MEDICARE

## 2024-04-11 VITALS
HEART RATE: 81 BPM | DIASTOLIC BLOOD PRESSURE: 68 MMHG | SYSTOLIC BLOOD PRESSURE: 110 MMHG | OXYGEN SATURATION: 93 % | BODY MASS INDEX: 26.47 KG/M2 | WEIGHT: 169 LBS

## 2024-04-11 DIAGNOSIS — I10 PRIMARY HYPERTENSION: ICD-10-CM

## 2024-04-11 DIAGNOSIS — I48.0 PAROXYSMAL ATRIAL FIBRILLATION (MULTI): Primary | ICD-10-CM

## 2024-04-11 DIAGNOSIS — I50.32 CHRONIC DIASTOLIC CONGESTIVE HEART FAILURE (MULTI): ICD-10-CM

## 2024-04-11 DIAGNOSIS — E78.49 OTHER HYPERLIPIDEMIA: ICD-10-CM

## 2024-04-11 DIAGNOSIS — I10 BENIGN HYPERTENSION: ICD-10-CM

## 2024-04-11 LAB
POC INR: 1.8
POC PROTHROMBIN TIME: NORMAL

## 2024-04-11 PROCEDURE — 3078F DIAST BP <80 MM HG: CPT | Performed by: INTERNAL MEDICINE

## 2024-04-11 PROCEDURE — 99214 OFFICE O/P EST MOD 30 MIN: CPT | Performed by: INTERNAL MEDICINE

## 2024-04-11 PROCEDURE — 1111F DSCHRG MED/CURRENT MED MERGE: CPT | Performed by: INTERNAL MEDICINE

## 2024-04-11 PROCEDURE — 3074F SYST BP LT 130 MM HG: CPT | Performed by: INTERNAL MEDICINE

## 2024-04-11 PROCEDURE — 99211 OFF/OP EST MAY X REQ PHY/QHP: CPT | Performed by: INTERNAL MEDICINE

## 2024-04-11 PROCEDURE — 1036F TOBACCO NON-USER: CPT | Performed by: INTERNAL MEDICINE

## 2024-04-11 PROCEDURE — 1160F RVW MEDS BY RX/DR IN RCRD: CPT | Performed by: INTERNAL MEDICINE

## 2024-04-11 PROCEDURE — 1126F AMNT PAIN NOTED NONE PRSNT: CPT | Performed by: INTERNAL MEDICINE

## 2024-04-11 PROCEDURE — 85610 PROTHROMBIN TIME: CPT | Mod: QW

## 2024-04-11 PROCEDURE — 1159F MED LIST DOCD IN RCRD: CPT | Performed by: INTERNAL MEDICINE

## 2024-04-11 PROCEDURE — 1157F ADVNC CARE PLAN IN RCRD: CPT | Performed by: INTERNAL MEDICINE

## 2024-04-11 ASSESSMENT — ENCOUNTER SYMPTOMS
DYSPNEA ON EXERTION: 0
DEPRESSION: 0
ORTHOPNEA: 0
LOSS OF SENSATION IN FEET: 0
OCCASIONAL FEELINGS OF UNSTEADINESS: 1
FEVER: 0
SHORTNESS OF BREATH: 0
PALPITATIONS: 0
WEIGHT LOSS: 0
PND: 0
CLAUDICATION: 0
IRREGULAR HEARTBEAT: 0
COUGH: 0
SYNCOPE: 0
MYALGIAS: 0
WEIGHT GAIN: 0
DIAPHORESIS: 0
WHEEZING: 0
NEAR-SYNCOPE: 0
DIZZINESS: 0
WEAKNESS: 0

## 2024-04-11 ASSESSMENT — PAIN SCALES - GENERAL: PAINLEVEL: 0-NO PAIN

## 2024-04-11 NOTE — PROGRESS NOTES
Patient identification verified with 2 identifiers.    Location: [facility] - 1st Floor Anticoagulation Clinic [address] [id number]    Referring Physician: Dr Abad  Enrollment/ Re-enrollment date:    INR Goal: 2.0-3.0  INR monitoring is per [facility] protocol.  Anticoagulation Medication: warfarin  Indication: Atrial Fibrillation/Atrial Flutter    Subjective   Bleeding signs/symptoms: No    Bruising: No   Major bleeding event: No  Thrombosis signs/symptoms: No  Thromboembolic event: No  Missed doses: Yes  missed a dose after he was discharged  Extra doses: No  Medication changes: No  Dietary changes: No  Change in health: No  Change in activity: No  Alcohol: No  Other concerns: No    Upcoming Procedures:  Does the Patient Have any upcoming procedures that require interruption in anticoagulation therapy? no  Does the patient require bridging? no      Anticoagulation Summary  As of 2024      INR goal:  2.0-3.0   TTR:  68.4% (5.1 mo)   INR used for dosin.80 (2024)   Weekly warfarin total:  14 mg               Assessment/Plan   Subtherapeutic     1. New dose: no change  one time dose increase  2. Next INR: 1 month      Education provided to patient during the visit:  Patient instructed to call in interim with questions, concerns and changes.   Patient educated on compliance with dosing, follow up appointments, and prescribed plan of care.

## 2024-04-11 NOTE — PROGRESS NOTES
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  He also has a history of paroxysmal atrial fibrillation and has been on oral anticoagulation in the form of Coumadin.  He has  severe COPD.  He was admitted in March with a COPD exacerbation.  We were not involved however it appears that his cardiac status was rather stable. He did have an echo that revealed nl LV size and fxn, trivial valve dz, stage 1 diastolic dysfxn.         Review of Systems   Constitutional: Negative for diaphoresis, fever, weight gain and weight loss.   Eyes:  Negative for visual disturbance.   Cardiovascular:  Negative for chest pain, claudication, dyspnea on exertion, irregular heartbeat, leg swelling, near-syncope, orthopnea, palpitations, paroxysmal nocturnal dyspnea and syncope.   Respiratory:  Negative for cough, shortness of breath and wheezing.    Musculoskeletal:  Negative for muscle weakness and myalgias.   Neurological:  Negative for dizziness and weakness.   All other systems reviewed and are negative.       Past Medical History:   Diagnosis Date    A-fib (CMS/[facility])     COPD (chronic obstructive pulmonary disease) (CMS/[facility])     Hyperlipidemia     Hypertension     Nonexudative age-related macular degeneration, right eye, early dry stage     Osteopenia     Other secondary cataract, bilateral     Personal history of other diseases of the circulatory system     History of angina    Personal history of other diseases of the circulatory system     History of hypertension    Personal history of other diseases of the respiratory system     History of chronic obstructive lung disease    Pseudophakia     Refractive error     Unspecified asthma with (acute) exacerbation     Asthma with acute exacerbation, unspecified asthma severity, unspecified whether persistent        Past Surgical History:   Procedure Laterality Date     CATARACT EXTRACTION      IR ANGIOGRAM PULMONARY Bilateral 04/10/2012    IR ANGIOGRAM PULMONARY [facility]    OTHER SURGICAL HISTORY  07/23/2021    No history of surgery        Social History     Socioeconomic History    Marital status: Single     Spouse name: Not on file    Number of children: Not on file    Years of education: Not on file    Highest education level: Not on file   Occupational History    Not on file   Tobacco Use    Smoking status: Never     Passive exposure: Never    Smokeless tobacco: Never   Vaping Use    Vaping status: Never Used   Substance and Sexual Activity    Alcohol use: Not Currently    Drug use: Never    Sexual activity: Defer   Other Topics Concern    Not on file   Social History Narrative    Not on file     Social Determinants of Health     Financial Resource Strain: Low Risk  (3/18/2024)    Overall Financial Resource Strain (CARDIA)     Difficulty of Paying Living Expenses: Not very hard   Food Insecurity: No Food Insecurity (3/18/2024)    Hunger Vital Sign     Worried About Running Out of Food in the Last Year: Never true     Ran Out of Food in the Last Year: Never true   Transportation Needs: No Transportation Needs (3/18/2024)    PRAPARE - Transportation     Lack of Transportation (Medical): No     Lack of Transportation (Non-Medical): No   Physical Activity: Inactive (3/18/2024)    Exercise Vital Sign     Days of Exercise per Week: 0 days     Minutes of Exercise per Session: 0 min   Stress: No Stress Concern Present (3/18/2024)    [place] [employer] - Occupational Stress Questionnaire     Feeling of Stress : Not at all   Social Connections: Moderately Isolated (3/18/2024)    Social Connection and Isolation Panel [NHANES]     Frequency of Communication with Friends and Family: More than three times a week     Frequency of Social Gatherings with Friends and Family: Three times a week     Attends Scientologist Services: More than 4 times per year      Active Member of Clubs or Organizations: No     Attends Club or Organization Meetings: Never     Marital Status: Never    Intimate Partner Violence: Not At Risk (3/18/2024)    Humiliation, Afraid, Rape, and Kick questionnaire     Fear of Current or Ex-Partner: No     Emotionally Abused: No     Physically Abused: No     Sexually Abused: No   Housing Stability: Low Risk  (3/18/2024)    Housing Stability Vital Sign     Unable to Pay for Housing in the Last Year: No     Number of Places Lived in the Last Year: 1     Unstable Housing in the Last Year: No        Family History   Problem Relation Name Age of Onset    Stroke Mother Heather Solis     Lung cancer Father      Stroke Sister Dianne Harper         OBJECTIVE:    Vitals:    04/11/24 1334   BP: 110/68   Pulse: 81   SpO2: 93%        Vitals reviewed.   Constitutional:       Appearance: Normal and healthy appearance. Not in distress.   Pulmonary:      Effort: Pulmonary effort is normal.      Breath sounds: Normal breath sounds.   Cardiovascular:      Normal rate. Irregular rhythm. Normal S1. Normal S2.       Murmurs: There is no murmur.      No gallop.  No click.   Pulses:     Intact distal pulses.   Edema:     Peripheral edema absent.   Skin:     General: Skin is warm and dry.   Neurological:      General: No focal deficit present.          Lab Review:   Lab Results   Component Value Date     03/23/2024    K 4.9 03/23/2024    CL 97 03/23/2024    CO2 31 03/23/2024    BUN 26 (H) 03/23/2024    CREATININE 1.00 03/23/2024    GLUCOSE 141 (H) 03/23/2024    CALCIUM 8.6 03/23/2024     Lab Results   Component Value Date    CHOL 155 02/14/2022    TRIG 155 (H) 02/14/2022    HDL 66 02/14/2022       Lab Results   Component Value Date    LDLCALC 58 (L) 02/14/2022        Hyperlipidemia  Lipids managed by Dr Dawson    Hypertension  Controlled. Continue current medication    Paroxysmal atrial fibrillation (CMS/HCC)  Stable, continue rate control and AC, coumadin  managed through coumadin clinic [facility]    Congestive heart failure (CMS/[facility])  Euvolemic. Continue current medication

## 2024-04-11 NOTE — ASSESSMENT & PLAN NOTE
Stable, continue rate control and AC, coumadin managed through coumadin clinic [facility]

## 2024-04-12 PROBLEM — R94.31 ELECTROCARDIOGRAM ABNORMAL: Status: ACTIVE | Noted: 2024-04-12

## 2024-04-12 PROBLEM — Z86.79 HISTORY OF HYPERTENSION: Status: ACTIVE | Noted: 2024-04-12

## 2024-04-12 PROBLEM — J44.1 ACUTE INFECTIVE EXACERBATION OF CHRONIC OBSTRUCTIVE AIRWAY DISEASE (MULTI): Status: ACTIVE | Noted: 2024-04-12

## 2024-04-12 PROBLEM — Z86.79 HISTORY OF ANGINA PECTORIS: Status: ACTIVE | Noted: 2024-04-12

## 2024-04-12 PROBLEM — J30.9 ALLERGIC RHINITIS: Status: ACTIVE | Noted: 2024-04-12

## 2024-04-12 PROBLEM — N18.2 STAGE 2 CHRONIC KIDNEY DISEASE: Status: ACTIVE | Noted: 2023-08-21

## 2024-04-12 PROBLEM — H61.23 BILATERAL IMPACTED CERUMEN: Status: ACTIVE | Noted: 2024-04-12

## 2024-04-12 PROBLEM — E66.3 OVERWEIGHT WITH BODY MASS INDEX (BMI) 25.0-29.9: Status: ACTIVE | Noted: 2024-04-12

## 2024-04-12 PROBLEM — R05.9 COUGH: Status: ACTIVE | Noted: 2023-02-19

## 2024-04-12 PROBLEM — B35.1 ONYCHOMYCOSIS: Status: ACTIVE | Noted: 2024-04-12

## 2024-04-12 PROBLEM — R73.09 BLOOD GLUCOSE ABNORMAL: Status: ACTIVE | Noted: 2024-04-12

## 2024-04-12 PROBLEM — U07.1 DISEASE DUE TO SEVERE ACUTE RESPIRATORY SYNDROME CORONAVIRUS 2 (SARS-COV-2): Status: ACTIVE | Noted: 2024-04-12

## 2024-04-12 PROBLEM — J44.9 SEVERE CHRONIC OBSTRUCTIVE PULMONARY DISEASE (MULTI): Status: ACTIVE | Noted: 2024-04-12

## 2024-04-15 ENCOUNTER — OFFICE VISIT (OUTPATIENT)
Dept: PRIMARY CARE | Facility: CLINIC | Age: 74
End: 2024-04-15
Payer: MEDICARE

## 2024-04-15 ENCOUNTER — LAB (OUTPATIENT)
Dept: LAB | Facility: LAB | Age: 74
End: 2024-04-15
Payer: MEDICARE

## 2024-04-15 ENCOUNTER — OFFICE VISIT (OUTPATIENT)
Dept: PAIN MEDICINE | Facility: CLINIC | Age: 74
End: 2024-04-15
Payer: MEDICARE

## 2024-04-15 VITALS
SYSTOLIC BLOOD PRESSURE: 128 MMHG | OXYGEN SATURATION: 91 % | HEART RATE: 71 BPM | WEIGHT: 158 LBS | DIASTOLIC BLOOD PRESSURE: 70 MMHG | HEIGHT: 67 IN | BODY MASS INDEX: 24.8 KG/M2 | RESPIRATION RATE: 16 BRPM

## 2024-04-15 VITALS
SYSTOLIC BLOOD PRESSURE: 132 MMHG | DIASTOLIC BLOOD PRESSURE: 78 MMHG | OXYGEN SATURATION: 93 % | HEIGHT: 67 IN | BODY MASS INDEX: 24.8 KG/M2 | RESPIRATION RATE: 16 BRPM | HEART RATE: 86 BPM | WEIGHT: 158 LBS

## 2024-04-15 DIAGNOSIS — M47.817 LUMBOSACRAL SPONDYLOSIS WITHOUT MYELOPATHY: ICD-10-CM

## 2024-04-15 DIAGNOSIS — I10 PRIMARY HYPERTENSION: ICD-10-CM

## 2024-04-15 DIAGNOSIS — I50.42 CHRONIC COMBINED SYSTOLIC AND DIASTOLIC CONGESTIVE HEART FAILURE (MULTI): ICD-10-CM

## 2024-04-15 DIAGNOSIS — R73.9 HYPERGLYCEMIA: ICD-10-CM

## 2024-04-15 DIAGNOSIS — Z12.5 SCREENING FOR PROSTATE CANCER: ICD-10-CM

## 2024-04-15 DIAGNOSIS — S32.040D CLOSED COMPRESSION FRACTURE OF L4 LUMBAR VERTEBRA WITH ROUTINE HEALING, SUBSEQUENT ENCOUNTER: ICD-10-CM

## 2024-04-15 DIAGNOSIS — I48.20 CHRONIC ATRIAL FIBRILLATION (MULTI): ICD-10-CM

## 2024-04-15 DIAGNOSIS — J44.9 CHRONIC OBSTRUCTIVE PULMONARY DISEASE, UNSPECIFIED COPD TYPE (MULTI): Primary | ICD-10-CM

## 2024-04-15 DIAGNOSIS — M48.061 SPINAL STENOSIS, LUMBAR REGION, WITHOUT NEUROGENIC CLAUDICATION: Primary | ICD-10-CM

## 2024-04-15 LAB
CHOLEST SERPL-MCNC: 181 MG/DL (ref 133–200)
CHOLEST/HDLC SERPL: 2.9 {RATIO}
EST. AVERAGE GLUCOSE BLD GHB EST-MCNC: 134 MG/DL
HBA1C MFR BLD: 6.3 %
HDLC SERPL-MCNC: 62 MG/DL
LDLC SERPL CALC-MCNC: 89 MG/DL (ref 65–130)
PSA SERPL-MCNC: 1.1 NG/ML
TRIGL SERPL-MCNC: 148 MG/DL (ref 40–150)

## 2024-04-15 PROCEDURE — 3074F SYST BP LT 130 MM HG: CPT | Performed by: INTERNAL MEDICINE

## 2024-04-15 PROCEDURE — 1159F MED LIST DOCD IN RCRD: CPT | Performed by: ANESTHESIOLOGY

## 2024-04-15 PROCEDURE — 1159F MED LIST DOCD IN RCRD: CPT | Performed by: INTERNAL MEDICINE

## 2024-04-15 PROCEDURE — 80061 LIPID PANEL: CPT

## 2024-04-15 PROCEDURE — 1126F AMNT PAIN NOTED NONE PRSNT: CPT | Performed by: INTERNAL MEDICINE

## 2024-04-15 PROCEDURE — 1157F ADVNC CARE PLAN IN RCRD: CPT | Performed by: ANESTHESIOLOGY

## 2024-04-15 PROCEDURE — 99214 OFFICE O/P EST MOD 30 MIN: CPT | Performed by: ANESTHESIOLOGY

## 2024-04-15 PROCEDURE — 99214 OFFICE O/P EST MOD 30 MIN: CPT | Performed by: INTERNAL MEDICINE

## 2024-04-15 PROCEDURE — 1111F DSCHRG MED/CURRENT MED MERGE: CPT | Performed by: ANESTHESIOLOGY

## 2024-04-15 PROCEDURE — 3075F SYST BP GE 130 - 139MM HG: CPT | Performed by: ANESTHESIOLOGY

## 2024-04-15 PROCEDURE — 1157F ADVNC CARE PLAN IN RCRD: CPT | Performed by: INTERNAL MEDICINE

## 2024-04-15 PROCEDURE — 83036 HEMOGLOBIN GLYCOSYLATED A1C: CPT

## 2024-04-15 PROCEDURE — 99204 OFFICE O/P NEW MOD 45 MIN: CPT | Performed by: ANESTHESIOLOGY

## 2024-04-15 PROCEDURE — 1160F RVW MEDS BY RX/DR IN RCRD: CPT | Performed by: INTERNAL MEDICINE

## 2024-04-15 PROCEDURE — 1111F DSCHRG MED/CURRENT MED MERGE: CPT | Performed by: INTERNAL MEDICINE

## 2024-04-15 PROCEDURE — 36415 COLL VENOUS BLD VENIPUNCTURE: CPT

## 2024-04-15 PROCEDURE — 1125F AMNT PAIN NOTED PAIN PRSNT: CPT | Performed by: ANESTHESIOLOGY

## 2024-04-15 PROCEDURE — 3078F DIAST BP <80 MM HG: CPT | Performed by: ANESTHESIOLOGY

## 2024-04-15 PROCEDURE — G0103 PSA SCREENING: HCPCS

## 2024-04-15 PROCEDURE — 3078F DIAST BP <80 MM HG: CPT | Performed by: INTERNAL MEDICINE

## 2024-04-15 PROCEDURE — 1160F RVW MEDS BY RX/DR IN RCRD: CPT | Performed by: ANESTHESIOLOGY

## 2024-04-15 ASSESSMENT — PAIN DESCRIPTION - DESCRIPTORS: DESCRIPTORS: ACHING

## 2024-04-15 ASSESSMENT — ENCOUNTER SYMPTOMS
DIARRHEA: 0
DIZZINESS: 0
OCCASIONAL FEELINGS OF UNSTEADINESS: 0
PALPITATIONS: 0
COUGH: 0
ABDOMINAL PAIN: 0
DEPRESSION: 0
ARTHRALGIAS: 0
APPETITE CHANGE: 1
NAUSEA: 0
LOSS OF SENSATION IN FEET: 0
UNEXPECTED WEIGHT CHANGE: 1
CONSTIPATION: 0
SHORTNESS OF BREATH: 1

## 2024-04-15 ASSESSMENT — PATIENT HEALTH QUESTIONNAIRE - PHQ9
2. FEELING DOWN, DEPRESSED OR HOPELESS: NOT AT ALL
1. LITTLE INTEREST OR PLEASURE IN DOING THINGS: NOT AT ALL
2. FEELING DOWN, DEPRESSED OR HOPELESS: NOT AT ALL
1. LITTLE INTEREST OR PLEASURE IN DOING THINGS: NOT AT ALL
SUM OF ALL RESPONSES TO PHQ9 QUESTIONS 1 AND 2: 0
SUM OF ALL RESPONSES TO PHQ9 QUESTIONS 1 AND 2: 0

## 2024-04-15 ASSESSMENT — LIFESTYLE VARIABLES: TOTAL SCORE: 0

## 2024-04-15 ASSESSMENT — PAIN - FUNCTIONAL ASSESSMENT: PAIN_FUNCTIONAL_ASSESSMENT: 0-10

## 2024-04-15 ASSESSMENT — PAIN SCALES - GENERAL
PAINLEVEL: 0-NO PAIN
PAINLEVEL_OUTOF10: 1

## 2024-04-15 NOTE — PROGRESS NOTES
WALKED PATIENT PER ORDER, HIS STARTING O2 WAS 98 WHILE WALKING IT NEVER WENT BELOW 95. PATIENT ALSO DID NOT DEMONSTRATE ANY SHORTNESS OF BREATH OR DIFFICULTY BREATHING

## 2024-04-15 NOTE — PROGRESS NOTES
"Subjective   Patient ID: Reggie Munoz is a 73 y.o. male who presents for two week follow up. Patient uses nebulizer, Pulmicort. He had issues getting it through insurance but will continue to try. He follows with Dr. Jefferson and uses oxygen at night. Patient has decreased appetite and normally eats once a day. His sister reports he has lost weight and asked about boost. Tries to limit salt in his diet and has been taking water pill. Takes vit D daily. Will get labs done.    Diagnostics Reviewed: 3/2024 CXR showed compression fractures in spine. 2022 DEXA showed osteopenia.  Labs Reviewed:         Review of Systems   Constitutional:  Positive for appetite change (decreased) and unexpected weight change (loss).   Respiratory:  Positive for shortness of breath. Negative for cough.    Cardiovascular:  Negative for chest pain and palpitations.   Gastrointestinal:  Negative for abdominal pain, constipation, diarrhea and nausea.   Musculoskeletal:  Negative for arthralgias.   Neurological:  Negative for dizziness.       Objective   /70   Pulse 71   Resp 16   Ht 1.702 m (5' 7\")   Wt 71.7 kg (158 lb)   SpO2 91%   BMI 24.75 kg/m²     Physical Exam  Cardiovascular:      Rate and Rhythm: Normal rate and regular rhythm.      Heart sounds: Normal heart sounds.   Pulmonary:      Breath sounds: Normal breath sounds.   Abdominal:      Palpations: Abdomen is soft.      Tenderness: There is no abdominal tenderness.   Musculoskeletal:      Right lower le+ Edema present.      Left lower le+ Edema present.   Neurological:      Mental Status: He is oriented to person, place, and time.   Psychiatric:         Mood and Affect: Mood normal.         Behavior: Behavior normal.         Assessment/Plan   Diagnoses and all orders for this visit:  Chronic obstructive pulmonary disease, unspecified COPD type (Multi)  Chronic atrial fibrillation (Multi)  Chronic combined systolic and diastolic congestive heart failure " (Multi)  Primary hypertension      Scribe Attestation  By signing my name below, I, Jeannine Enriquez   attest that this documentation has been prepared under the direction and in the presence of Rajwinder Dawson MD.

## 2024-04-15 NOTE — PATIENT INSTRUCTIONS
6 min walk in office, if drops below 90 will arrange for portable oxygen 2 liters w Fam. Referral to , needs set up with meals on wheels. Recommend Calcium 1 gram daily and vit D 2000 units daily
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
## 2024-04-16 ASSESSMENT — ENCOUNTER SYMPTOMS
NEUROLOGICAL NEGATIVE: 1
EYES NEGATIVE: 1
CARDIOVASCULAR NEGATIVE: 1
PSYCHIATRIC NEGATIVE: 1
RESPIRATORY NEGATIVE: 1
HEMATOLOGIC/LYMPHATIC NEGATIVE: 1
CONSTITUTIONAL NEGATIVE: 1
ENDOCRINE NEGATIVE: 1
ALLERGIC/IMMUNOLOGIC NEGATIVE: 1
GASTROINTESTINAL NEGATIVE: 1
ARTHRALGIAS: 1

## 2024-04-18 ENCOUNTER — TELEMEDICINE (OUTPATIENT)
Dept: PHARMACY | Facility: HOSPITAL | Age: 74
End: 2024-04-18
Payer: MEDICARE

## 2024-04-18 DIAGNOSIS — I50.32 CHRONIC DIASTOLIC CONGESTIVE HEART FAILURE (MULTI): ICD-10-CM

## 2024-04-18 DIAGNOSIS — J44.9 CHRONIC OBSTRUCTIVE PULMONARY DISEASE, UNSPECIFIED COPD TYPE (MULTI): ICD-10-CM

## 2024-04-18 NOTE — PROGRESS NOTES
Pharmacy Post-Discharge Visit  Reggie Munoz is a 73 y.o. male who was referred to the Clinical Pharmacy Team to complete a post-discharge medication optimization and monitoring visit.  The patient was referred for their COPD and Congestive Heart Failure.    Recent Hospitalization  Admission Date: 3/18/24  Discharge Date: 3/23/24  Discharge Diagnosis: COPD exacerbation     Referring Provider: Dr. Rajwinder Dawson    Preferred Pharmacy  [id number] [address] AT [facility]  [address] [place] [address] RD  [facility] [address]  Phone: [phone] Fax: [phone]    No Known Allergies      COPD ASSESSMENT     Current Medication Regimen  Spiriva 2.5 mcg/act, 2 puffs BID- not taking?  Pulmicort 180 mcg/act, 1 puff BID  Albuterol HFA PRN  DuoNebs PRN  Oxygen, 2L continuous     Inhaler Use:  How many times per week do you use your rescue inhaler? Usually twice daily (part of a routine)  How many days per week do you remember to use your maintenance inhaler? Unknown  Do you often miss the second dose (if applicable) of your maintenance inhaler? Unknown     Inhaler Technique:  How do you use your rescue inhaler? 2 times daily  How do you use your maintenance inhaler? N/A     Secondary Prevention (vaccines):  Influenza: Date [Unknown]  PCV13: Date [9/7/18]  PPSV23: Date [10/20/16]     Symptom Management:  Increased cough? no  Increased sputum production? no  Increased SOB? no     Exacerbation History:  When was your last hospitalization for an exacerbation? 3/2024  When was the last time you were treated with antibiotics and/or steroids? Prednisone taken 3/2024 after discharge     Smoking Cessation  Never        CHF ASSESSMENT     Staging:  Most recent ejection fraction: 60-65% as of 3/22/24     Symptoms/Monitoring:  Reported baseline weight: 171 lbs  Weighing self everyday? Not currently- recommended  Weight changes? Recently lost about ~10 pounds over a gradual period of  time  Edema? Does report some swelling in feet- reports   Aware to contact cardiology if gaining 2-3 lbs/24 hrs OR 5 lbs/week? Yes  Shortness of breath? Difficult to determine, concurrent diagnosis of COPD     Diet/Lifestyle:  Following low-salt diet? No  Monitoring liquid intake? No     Guideline-Directed Medical Therapy:  ARNI: No  If no, then ACEi/ARB?: Yes, however, not taking  Beta Blocker: No  MRA: No  SGLT2i: No  Furosemide 40 mg daily  Diltiazem  mg daily  Digoxin 125 mcg daily  Blood Pressure: Appears decently controlled upon review of vitals, diastolic appears consistently lower in the 60s-80s. Denies any dizziness or symptoms of orthostatic hypotension     Secondary Prevention:  The 10-year ASCVD risk score (Varsha PENN, et al., 2019) is: 15.1%    Values used to calculate the score:      Age: 73 years      Sex: Male      Is Non- : No      Diabetic: No      Tobacco smoker: No      Systolic Blood Pressure: 116 mmHg      Is BP treated: Yes      HDL Cholesterol: 66 MG/DL      Total Cholesterol: 155 MG/DL   Aspirin 81mg? no  Statin?: No, most recent LDL 58 mg/dL as of 2/14/22  HTN?: Yes, describe: controlled     Historical Pharmacotherapy  Cannot recall previous pulmonary medications    Laboratory Results  Lab Results   Component Value Date    BILITOT 0.5 03/18/2024    CALCIUM 8.6 03/23/2024    CO2 31 03/23/2024    CL 97 03/23/2024    CREATININE 1.00 03/23/2024    GLUCOSE 141 (H) 03/23/2024    ALKPHOS 113 03/18/2024    K 4.9 03/23/2024    PROT 6.6 03/18/2024     03/23/2024    AST 27 03/18/2024    ALT 26 03/18/2024    BUN 26 (H) 03/23/2024    ANIONGAP 11 03/23/2024    MG 1.90 10/31/2023     (H) 06/28/2020    ALBUMIN 3.8 03/18/2024    LIPASE <16 (L) 12/31/2023    EGFR 80 03/23/2024     Lab Results   Component Value Date    TRIG 148 04/15/2024    CHOL 181 04/15/2024    HDL 62.0 04/15/2024     Lab Results   Component Value Date    HGBA1C 6.3 (H) 04/15/2024          Assessment/Plan   Problem List Items Addressed This Visit       Chronic obstructive pulmonary disease (Multi)    Relevant Orders    Follow Up In Clinical Pharmacy    Congestive heart failure (Multi)    Relevant Orders    Follow Up In Clinical Pharmacy     General Patient Discussion  Patient to put together nebulizer machine this week with the help of his sister    Plan/Changes   Continue all meds under the continuation of care with the referring provider and clinical pharmacy team  Specialists: Patient currently sees outpatient pulmonology.    Next Clinical Pharmacy Follow-Up  Friday, April 26th, 10:00 am  Updates from pulmonology  Nebulizer machine      Olga Lidia Henderson PharmD     Verbal consent to manage patient's drug therapy was obtained from the patient. They were informed they may decline to participate or withdraw from participation in pharmacy services at any time.

## 2024-04-25 ENCOUNTER — EVALUATION (OUTPATIENT)
Dept: PHYSICAL THERAPY | Facility: CLINIC | Age: 74
End: 2024-04-25
Payer: MEDICARE

## 2024-04-25 DIAGNOSIS — J44.1 COPD EXACERBATION (MULTI): ICD-10-CM

## 2024-04-25 PROCEDURE — 97162 PT EVAL MOD COMPLEX 30 MIN: CPT | Mod: GP | Performed by: PHYSICAL THERAPIST

## 2024-04-25 PROCEDURE — 97110 THERAPEUTIC EXERCISES: CPT | Mod: GP | Performed by: PHYSICAL THERAPIST

## 2024-04-25 PROCEDURE — 97530 THERAPEUTIC ACTIVITIES: CPT | Mod: GP | Performed by: PHYSICAL THERAPIST

## 2024-04-25 ASSESSMENT — ENCOUNTER SYMPTOMS
OCCASIONAL FEELINGS OF UNSTEADINESS: 1
LOSS OF SENSATION IN FEET: 0
DEPRESSION: 0

## 2024-04-25 NOTE — PROGRESS NOTES
Physical Therapy Evaluation and Treatment      Patient Name: Reggie Munoz  MRN: [id number]  Today's Date: 4/25/2024  Time Calculation  Start Time: 1440  Stop Time: 1521  Time Calculation (min): 41 min  PT Therapeutic Procedures Time Entry  Therapeutic Exercise Time Entry: 14  Therapeutic Activity Time Entry: 11      Insurance:  Visit number: 1 of 6  Authorization info: [place] / $15 COPAY   Insurance Type: Payor: AET[employer] MEDICARE / Plan: AET[employer] MEDICARE ASSURE / Product Type: *No Product type* /     Current Problem:   1. COPD exacerbation (Multi)  Referral to Physical Therapy    Follow Up In Physical Therapy          Subjective    General:  Pt reports he was in the hospital for 6 days and has gotten a lot of weakness in both of his legs as a result. There is a concern from family as they feel he needs a walker for support. Get around comfortably and be able to sleep better is a goal. Would like to receive a rolling walker and adjustable bed to help. Also goes to pain management for low back. Back of legs bothering him and feel tight. Not driving currently. No falls. Sleeps in recliner due to COPD with limited tolerance to laying on back.       Precautions: Falls   Pain: 7/10      Objective   ROM   Lumbar spine AROM:  Flex 50%  Ext unable to achieve neutral  Lat flex L/R 10%  Rot L/R 10%  (Pain with all)    MMT   Bilateral LE strength:  Hip flex 4-/5  Knee flex 4-/5  Knee ext 4-/5  (Pain with all)    Palpation   Moderate edema in bilateral LE       Flexibility  Significant to Moderate hamstring tightness, bilaterally  Moderate hip flexor tightness, bilaterally    Special Tests   SLUMP: negative, bilaterally     Transfers   Bilateral UE support for sit to stand    Gait   Ambulates with decreased ariella and significantly forward flexed posture. Narrow ASTON with unsteadiness. No heel to toe pattern, bilaterally.     Stairs   Ascends and descends steps with step-to-pattern using 1 rail.    Balance   Romberg: Firm Eyes  Open 11 sec      Outcome Measures:  LEFS: 22/80    Treatments:  Therapeutic Exercise:  PPT, LTR in seated, Seated hip flexion, LAQ (all bilaterally)       Therapeutic activity:  Educated pt on eval findings and POC  Educated pt on anatomy of spine  Discussed sleeping positions       Assessment   Assessment:   Pt is a 73 y.o. male with chronic low back pain due to stenosis and difficulty with walking. Pt with gait deficits, endurance limitations, impaired balance, decreased ROM, reduced strength, significant posture abnormalities, and flexibility restrictions. Pt will benefit from skilled PT to address the above deficits for improvement in functional activities.     *At this time, I believe home PT is best for patient (he also has transportation difficulties). He would benefit from home assessment to meet his needs. (Patient prefers outpatient as home has little space to maneuver)*      Moderate complexity due to patient's clinical presentation being evolving with changing characteristics, with comorbidities/complexities to include heart disease, COPD, lung disease, osteoporosis, heart disease, all of which may negatively impact rehab tolerance and progression.       Plan:   Treatment/Interventions: Education/ Instruction, Gait training, Manual therapy, Neuromuscular re-education, Self care/ home management, Therapeutic activities, Therapeutic exercises  PT Plan: Skilled PT  PT Frequency: 1 time per week  Duration: 5 more visits  Rehab Potential: Fair  Plan of Care Agreement: Patient      Goals:   Active       Mobility       Goal 1       Start:  04/25/24    Expected End:  07/24/24       Pt will improve lumbar spine AROM to 75% to improve I/ADLs.          Goal 2       Start:  04/25/24    Expected End:  07/24/24       Pt will improve LE strength to 4+/5 to improve I/ADLs.             Pain       Goal 1       Start:  04/25/24    Expected End:  07/24/24       Pt will perform all housework with <3/10 pain.          Goal 2        Start:  04/25/24    Expected End:  07/24/24       Pt will walk >10 min with <3/10 pain

## 2024-04-26 ENCOUNTER — PATIENT OUTREACH (OUTPATIENT)
Dept: PRIMARY CARE | Facility: CLINIC | Age: 74
End: 2024-04-26

## 2024-04-26 ENCOUNTER — SPECIALTY PHARMACY (OUTPATIENT)
Dept: PHARMACY | Facility: CLINIC | Age: 74
End: 2024-04-26

## 2024-04-26 ENCOUNTER — TELEMEDICINE (OUTPATIENT)
Dept: PHARMACY | Facility: HOSPITAL | Age: 74
End: 2024-04-26
Payer: MEDICARE

## 2024-04-26 DIAGNOSIS — J44.9 CHRONIC OBSTRUCTIVE PULMONARY DISEASE, UNSPECIFIED COPD TYPE (MULTI): ICD-10-CM

## 2024-04-26 DIAGNOSIS — I50.32 CHRONIC DIASTOLIC CONGESTIVE HEART FAILURE (MULTI): ICD-10-CM

## 2024-04-26 RX ORDER — FLUTICASONE FUROATE, UMECLIDINIUM BROMIDE AND VILANTEROL TRIFENATATE 100; 62.5; 25 UG/1; UG/1; UG/1
POWDER RESPIRATORY (INHALATION)
Qty: 60 EACH | Refills: 11 | Status: SHIPPED | OUTPATIENT
Start: 2024-04-26

## 2024-04-26 NOTE — PROGRESS NOTES
Received call from patient's sister. She reports they went to outpatient therapy yesterday and outpatient therapist told them he needs to have physical therapy at home instead as patient would benefit better from it. Sister reports she is aware patient refused this in the past but he is now agreeable. She stated she is willing to make sure home care can come and she will meet them there to assist. Message to Dr Dawson's office to inquire for referral for home care.

## 2024-04-26 NOTE — PROGRESS NOTES
Pharmacy Post-Discharge Visit  Reggie Munoz is a 73 y.o. male who was referred to the Clinical Pharmacy Team to complete a post-discharge medication optimization and monitoring visit.  The patient was referred for their No chief complaint on file..    Recent Hospitalization  Admission Date: 3/18/24  Discharge Date: 3/23/24  Discharge Diagnosis: COPD exacerbation     Referring Provider: Dr. Rajwinder Dawson    Preferred Pharmacy  [facility] #[id number] [address] AT [facility] & [place]  [address]  [facility] [address]  Phone: [phone] Fax: [phone]    No Known Allergies    COPD ASSESSMENT     Current Medication Regimen  Spiriva 2.5 mcg/act, 2 puffs BID- not taking?  Pulmicort 180 mcg/act, 1 puff BID  Albuterol HFA PRN  DuoNebs PRN  Oxygen, 2L continuous     Inhaler Use:  How many times per week do you use your rescue inhaler? Usually twice daily (part of a routine)  How many days per week do you remember to use your maintenance inhaler? Unknown  Do you often miss the second dose (if applicable) of your maintenance inhaler? Unknown     Inhaler Technique:  How do you use your rescue inhaler? 2 times daily  How do you use your maintenance inhaler? N/A     Secondary Prevention (vaccines):  Influenza: Date [Unknown]  PCV13: Date [9/7/18]  PPSV23: Date [10/20/16]     Symptom Management:  Increased cough? no  Increased sputum production? no  Increased SOB? no     Exacerbation History:  When was your last hospitalization for an exacerbation? 3/2024  When was the last time you were treated with antibiotics and/or steroids? Prednisone taken 3/2024 after discharge     Smoking Cessation  Never        CHF ASSESSMENT     Staging:  Most recent ejection fraction: 60-65% as of 3/22/24     Symptoms/Monitoring:  Reported baseline weight: 171 lbs  Weighing self everyday? Not currently- recommended  Weight changes? Recently lost about ~10 pounds over a gradual period of time  Edema?  Does report some swelling in feet- reports   Aware to contact cardiology if gaining 2-3 lbs/24 hrs OR 5 lbs/week? Yes  Shortness of breath? Difficult to determine, concurrent diagnosis of COPD     Diet/Lifestyle:  Following low-salt diet? No  Monitoring liquid intake? No     Guideline-Directed Medical Therapy:  ARNI: No  If no, then ACEi/ARB?: Yes, however, not taking  Beta Blocker: No  MRA: No  SGLT2i: No  Furosemide 40 mg daily  Diltiazem  mg daily  Digoxin 125 mcg daily  Blood Pressure: Appears decently controlled upon review of vitals, diastolic appears consistently lower in the 60s-80s. Denies any dizziness or symptoms of orthostatic hypotension     Secondary Prevention:  The 10-year ASCVD risk score (Varsha PENN, et al., 2019) is: 15.1%    Values used to calculate the score:      Age: 73 years      Sex: Male      Is Non- : No      Diabetic: No      Tobacco smoker: No      Systolic Blood Pressure: 116 mmHg      Is BP treated: Yes      HDL Cholesterol: 66 MG/DL      Total Cholesterol: 155 MG/DL   Aspirin 81mg? no  Statin?: No, most recent LDL 58 mg/dL as of 2/14/22  HTN?: Yes, describe: controlled     Historical Pharmacotherapy  Cannot recall previous pulmonary medications      PAP Eligibility Assessment  This patient has expressed a need for medication cost assistance and will be considered for the  Patient Assistance Program ( PAP). If patient is approved, the below medications would result in a $0 cost/month for the patient. If approved, patient must then provide updated financial documents each calendar year and have a new prescription issued by a  Meds pharmacist to continue receiving medications at $0 cost.    Medications  Trelegy    Eligibility Requirements  [x] Patient has pre-existing prescription coverage  [x] Medication(s) above are listed on  PAP formulary  [x] Income: Less than or equal to 400% Federal Poverty Limit  Household Persons: 1  [x] Patient's  prescription insurance is contracted with either  [facility] or  [facility]    2024* Poverty Guidelines for the [place]   Persons in Family/Household Poverty Guideline Annual 400% Monthly 400%   1 $15,060 $60,240 $5,020   2 $20,440 $81,760 $6,813   3 $25,820 $103,280 $8,607   4 $31,200 $124,800 $10,400   5 $36,580 $146,320 $12,193   6 $41,960 $167,840 $13,987   7 $47,340 $189,360 $15,780   8 $52,720 $210,880 $17,573   *[facility] updates FPL guidelines in late January of each calendar year.      Initial Eligibility Determination  Note: Clinical Pharmacy is not responsible for finalizing eligibility approval. The  PAP team determines final eligibility.  This patient will likely qualify for the  PAP program.    Financial Documents Required for Application Submission  [x] 1040 Tax Form       Laboratory Results  Lab Results   Component Value Date    BILITOT 0.5 03/18/2024    CALCIUM 8.6 03/23/2024    CO2 31 03/23/2024    CL 97 03/23/2024    CREATININE 1.00 03/23/2024    GLUCOSE 141 (H) 03/23/2024    ALKPHOS 113 03/18/2024    K 4.9 03/23/2024    PROT 6.6 03/18/2024     03/23/2024    AST 27 03/18/2024    ALT 26 03/18/2024    BUN 26 (H) 03/23/2024    ANIONGAP 11 03/23/2024    MG 1.90 10/31/2023     (H) 06/28/2020    ALBUMIN 3.8 03/18/2024    LIPASE <16 (L) 12/31/2023    EGFR 80 03/23/2024     Lab Results   Component Value Date    TRIG 148 04/15/2024    CHOL 181 04/15/2024    HDL 62.0 04/15/2024     Lab Results   Component Value Date    HGBA1C 6.3 (H) 04/15/2024         Assessment/Plan   Problem List Items Addressed This Visit    None    General Patient Discussion  Trelegy  Approval from pulmonology to start via Epic In-Basket messaging  Discussed with patient that this would replace Pulmicort and Spiriva   PAP   Discussed process, patient amenable  Patient aware may take 3-6 weeks for approval  Patient will drop of 1040 information at [facility], team  will scan over to this pharmacist    Plan/Changes   Continue all meds under the continuation of care with the referring provider and clinical pharmacy team  Specialists: Patient currently sees outpatient pulmonology (Dr. Deon Jefferson).  Mail out info on  PAP to patient- will be postmarked 4/27/24  Start Trelegy 100-62.5-25 mcg, 1 puff once daily. Rinse mouth after each use.     Next PCP Follow-Up  7/15/24    Next Clinical Pharmacy Follow-Up  3 weeks   PAP updates      Olga Lidia Henderson, PharmD     Verbal consent to manage patient's drug therapy was obtained from the patient. They were informed they may decline to participate or withdraw from participation in pharmacy services at any time.

## 2024-05-01 ENCOUNTER — PATIENT OUTREACH (OUTPATIENT)
Dept: PRIMARY CARE | Facility: CLINIC | Age: 74
End: 2024-05-01
Payer: MEDICARE

## 2024-05-01 NOTE — PROGRESS NOTES
Received call from patient;s sister stating she called to set up home care for her brother and they told her referral is ordered for outpatient physical therapy. She reports outpatient physical therapist could not do anything with him and told him he needed physical therapy in his home. Message sent to Dr Dawson's office to inquire.

## 2024-05-07 ENCOUNTER — PATIENT OUTREACH (OUTPATIENT)
Dept: PRIMARY CARE | Facility: CLINIC | Age: 74
End: 2024-05-07
Payer: MEDICARE

## 2024-05-07 NOTE — PROGRESS NOTES
Spoke with patient's sister for 1 month follow up. She reports patient is doing alright. She reports they have not heard anything back regarding home care services for physical therapy. Message sent again to PCP to inquire. His sister reports she will call with any further questions.

## 2024-05-08 ENCOUNTER — ANTICOAGULATION - WARFARIN VISIT (OUTPATIENT)
Dept: CARDIOLOGY | Facility: HOSPITAL | Age: 74
End: 2024-05-08
Payer: MEDICARE

## 2024-05-08 DIAGNOSIS — I48.0 PAROXYSMAL ATRIAL FIBRILLATION (MULTI): Primary | ICD-10-CM

## 2024-05-08 LAB
POC INR: 2.1
POC PROTHROMBIN TIME: NORMAL

## 2024-05-08 PROCEDURE — 85610 PROTHROMBIN TIME: CPT | Mod: QW

## 2024-05-08 PROCEDURE — 99211 OFF/OP EST MAY X REQ PHY/QHP: CPT | Performed by: INTERNAL MEDICINE

## 2024-05-16 ENCOUNTER — TELEMEDICINE (OUTPATIENT)
Dept: PHARMACY | Facility: HOSPITAL | Age: 74
End: 2024-05-16
Payer: MEDICARE

## 2024-05-16 DIAGNOSIS — I50.32 CHRONIC DIASTOLIC CONGESTIVE HEART FAILURE (MULTI): ICD-10-CM

## 2024-05-16 DIAGNOSIS — J44.9 CHRONIC OBSTRUCTIVE PULMONARY DISEASE, UNSPECIFIED COPD TYPE (MULTI): ICD-10-CM

## 2024-05-16 PROCEDURE — RXMED WILLOW AMBULATORY MEDICATION CHARGE

## 2024-05-16 NOTE — PROGRESS NOTES
Pharmacy Post-Discharge Visit  Reggie Munoz is a 73 y.o. male who was referred to the Clinical Pharmacy Team to complete a post-discharge medication optimization and monitoring visit.  The patient was referred for their COPD and Congestive Heart Failure.    Recent Hospitalization  Admission Date: 3/18/24  Discharge Date: 3/23/24  Discharge Diagnosis: COPD exacerbation     Referring Provider: Dr. Rajwinder Dawson    Preferred Pharmacy  [facility] #[id number] [facility] [address] [place] [address] RD AT [facility] & [place]  [address] [facility] [address]  [facility] [address]  Phone: [phone] Fax: [phone]    [facility]  [address], [address]  [facility] [address]  Phone: [phone] Fax: [phone]    No Known Allergies    COPD ASSESSMENT     Current Medication Regimen  Spiriva 2.5 mcg/act, 2 puffs BID- not taking?  Pulmicort 180 mcg/act, 1 puff BID  Albuterol HFA PRN  DuoNebs PRN  Oxygen, 2L continuous     Inhaler Use:  How many times per week do you use your rescue inhaler? Usually twice daily (part of a routine)  How many days per week do you remember to use your maintenance inhaler? Unknown  Do you often miss the second dose (if applicable) of your maintenance inhaler? Unknown     Inhaler Technique:  How do you use your rescue inhaler? 2 times daily  How do you use your maintenance inhaler? N/A     Secondary Prevention (vaccines):  Influenza: Date [Unknown]  PCV13: Date [9/7/18]  PPSV23: Date [10/20/16]     Symptom Management:  Increased cough? no  Increased sputum production? no  Increased SOB? no     Exacerbation History:  When was your last hospitalization for an exacerbation? 3/2024  When was the last time you were treated with antibiotics and/or steroids? Prednisone taken 3/2024 after discharge     Smoking Cessation  Never        CHF ASSESSMENT     Staging:  Most recent ejection fraction: 60-65% as of 3/22/24     Symptoms/Monitoring:  Reported baseline weight: 171 lbs  Weighing  self everyday? Not currently- recommended  Weight changes? Recently lost about ~10 pounds over a gradual period of time  Edema? Has swelling in feet, but has recently went down.   Aware to contact cardiology if gaining 2-3 lbs/24 hrs OR 5 lbs/week? Yes  Shortness of breath? Difficult to determine, concurrent diagnosis of COPD     Diet/Lifestyle:  Following low-salt diet? No  Monitoring liquid intake? No     Guideline-Directed Medical Therapy:  ARNI: No  If no, then ACEi/ARB?: Yes, however, not taking  Beta Blocker: No  MRA: No  SGLT2i: No  Furosemide 40 mg daily  Diltiazem  mg daily  Digoxin 125 mcg daily  Blood Pressure: Appears decently controlled upon review of vitals, diastolic appears consistently lower in the 60s-80s. Denies any dizziness or symptoms of orthostatic hypotension     Secondary Prevention:  The 10-year ASCVD risk score (Varsha PENN, et al., 2019) is: 15.1%    Values used to calculate the score:      Age: 73 years      Sex: Male      Is Non- : No      Diabetic: No      Tobacco smoker: No      Systolic Blood Pressure: 116 mmHg      Is BP treated: Yes      HDL Cholesterol: 66 MG/DL      Total Cholesterol: 155 MG/DL   Aspirin 81mg? no  Statin?: No, most recent LDL 58 mg/dL as of 2/14/22  HTN?: Yes, describe: controlled     Historical Pharmacotherapy  Cannot recall previous pulmonary medications      PAP Eligibility Assessment  This patient has expressed a need for medication cost assistance and will be considered for the  Patient Assistance Program ( PAP). If patient is approved, the below medications would result in a $0 cost/month for the patient. If approved, patient must then provide updated financial documents each calendar year and have a new prescription issued by a  Meds pharmacist to continue receiving medications at $0 cost.     Medications  Trelegy     Eligibility Requirements  [x] Patient has pre-existing prescription coverage  [x] Medication(s) above  are listed on  PAP formulary  [x] Income: Less than or equal to 400% Federal Poverty Limit  Household Persons: 1  [x] Patient's prescription insurance is contracted with either  [facility] or  [facility]           2024* Poverty Guidelines for the [facility] and [facility]   Persons in Family/Household Poverty Guideline Annual 400% Monthly 400%   1 $15,060 $60,240 $5,020   2 $20,440 $81,760 $6,813   3 $25,820 $103,280 $8,607   4 $31,200 $124,800 $10,400   5 $36,580 $146,320 $12,193   6 $41,960 $167,840 $13,987   7 $47,340 $189,360 $15,780   8 $52,720 $210,880 $17,573   *[facility] updates FPL guidelines in late January of each calendar year.       Initial Eligibility Determination  Note: Clinical Pharmacy is not responsible for finalizing eligibility approval. The  PAP team determines final eligibility.  This patient will likely qualify for the  PAP program.     Financial Documents Required for Application Submission  [x] 1040 Tax Form        Laboratory Results  Lab Results   Component Value Date    BILITOT 0.5 03/18/2024    CALCIUM 8.6 03/23/2024    CO2 31 03/23/2024    CL 97 03/23/2024    CREATININE 1.00 03/23/2024    GLUCOSE 141 (H) 03/23/2024    ALKPHOS 113 03/18/2024    K 4.9 03/23/2024    PROT 6.6 03/18/2024     03/23/2024    AST 27 03/18/2024    ALT 26 03/18/2024    BUN 26 (H) 03/23/2024    ANIONGAP 11 03/23/2024    MG 1.90 10/31/2023     (H) 06/28/2020    ALBUMIN 3.8 03/18/2024    LIPASE <16 (L) 12/31/2023    EGFR 80 03/23/2024     Lab Results   Component Value Date    TRIG 148 04/15/2024    CHOL 181 04/15/2024    HDL 62.0 04/15/2024     Lab Results   Component Value Date    HGBA1C 6.3 (H) 04/15/2024         Assessment/Plan   Problem List Items Addressed This Visit       Chronic obstructive pulmonary disease (Multi)    Congestive heart failure (Multi)     General Patient Discussion  This pharmacist successfully received financial documentation dropped off by sister at  [facility]   Patient Assistance Program application has been submitted after receiving documentation  Patient confirms he would like to proceed with Trelegy    Plan/Changes   Continue all meds under the continuation of care with the referring provider and clinical pharmacy team    Next PCP Follow-Up  7/15/24    Next Clinical Pharmacy Follow-Up  2 weeks, Monday, May 30th 10 am   PAP updates  Trelegy education  Determine if in-office training necessary for Trelegy use- sees Dr. Ronny Henderson, PharmD     Verbal consent to manage patient's drug therapy was obtained from the patient. They were informed they may decline to participate or withdraw from participation in pharmacy services at any time.

## 2024-05-18 ENCOUNTER — PHARMACY VISIT (OUTPATIENT)
Dept: PHARMACY | Facility: CLINIC | Age: 74
End: 2024-05-18
Payer: COMMERCIAL

## 2024-05-30 ENCOUNTER — HOME HEALTH ADMISSION (OUTPATIENT)
Dept: HOME HEALTH SERVICES | Facility: HOME HEALTH | Age: 74
End: 2024-05-30
Payer: MEDICARE

## 2024-05-30 ENCOUNTER — TELEMEDICINE (OUTPATIENT)
Dept: PHARMACY | Facility: HOSPITAL | Age: 74
End: 2024-05-30
Payer: MEDICARE

## 2024-05-30 ENCOUNTER — TELEPHONE (OUTPATIENT)
Dept: HOME HEALTH SERVICES | Facility: HOME HEALTH | Age: 74
End: 2024-05-30

## 2024-05-30 ENCOUNTER — DOCUMENTATION (OUTPATIENT)
Dept: HOME HEALTH SERVICES | Facility: HOME HEALTH | Age: 74
End: 2024-05-30

## 2024-05-30 DIAGNOSIS — M85.88 OSTEOPENIA OF LUMBAR SPINE: ICD-10-CM

## 2024-05-30 DIAGNOSIS — I50.32 CHRONIC DIASTOLIC CONGESTIVE HEART FAILURE (MULTI): ICD-10-CM

## 2024-05-30 DIAGNOSIS — S32.050A COMPRESSION FRACTURE OF L5 VERTEBRA, INITIAL ENCOUNTER (MULTI): ICD-10-CM

## 2024-05-30 DIAGNOSIS — J44.9 CHRONIC OBSTRUCTIVE PULMONARY DISEASE, UNSPECIFIED COPD TYPE (MULTI): ICD-10-CM

## 2024-05-30 NOTE — Clinical Note
Dr. Jefferson- Patient has been approved for the  Patient Assistance Program, which provides a $0 copay on his new Trelegy medication. Sister is concerned that patient needs a physical, hands-on demonstration of Trelegy and confirmation of which medications this replaces (Pulmicort, Spiriva). Is there any way that patient could be scheduled for a nurse visit at your office to demonstrate Trelegy use? Patient would bring his Trelegy device with him, as well. Ultimately discussed with patient's sister that if Trelegy is too difficult for patient to use/does not prefer, we can revert back to his Pulmicort and Spiriva regimen.

## 2024-05-30 NOTE — TELEPHONE ENCOUNTER
Thank you for your referral to  Home Care. At this time, we are unable to accommodate your patient's needs in a safe and timely manner. In order to help your patient receive quality home care, we have included certified agencies that service this area:         [facility] - P: [phone]; F: [phone]            [facility] - P: [phone]; F:[phone]         You may contact one of these agencies, or an alternate of your choice, to see if they are able to accept your patient.    Thank you,  [facility]

## 2024-05-30 NOTE — PROGRESS NOTES
Pharmacy Post-Discharge Visit  Reggie Munoz is a 73 y.o. male who was referred to the Clinical Pharmacy Team to complete a post-discharge medication optimization and monitoring visit.  The patient was referred for their COPD and Congestive Heart Failure.    Recent Hospitalization  Admission Date: 3/18/24  Discharge Date: 3/23/24  Discharge Diagnosis: COPD exacerbation     Referring Provider: Dr. Rajwinder Dawson    Preferred Pharmacy  [facility] #[id number] [facility] [address] [place] [address] RD AT [facility] & [place]  [address] [facility]  [facility] [address]  Phone: [phone] Fax: [phone]    [facility]  [address]  [facility] [address]  Phone: [phone] Fax: [phone]    No Known Allergies    COPD ASSESSMENT     Current Medication Regimen  Spiriva 2.5 mcg/act, 2 puffs BID- not taking?  Pulmicort 180 mcg/act, 1 puff BID  Albuterol HFA PRN  DuoNebs PRN  Oxygen, 2L continuous     Inhaler Use:  How many times per week do you use your rescue inhaler? Usually twice daily (part of a routine)  How many days per week do you remember to use your maintenance inhaler? Unknown  Do you often miss the second dose (if applicable) of your maintenance inhaler? Unknown     Inhaler Technique:  How do you use your rescue inhaler? 2 times daily  How do you use your maintenance inhaler? N/A     Secondary Prevention (vaccines):  Influenza: Date [Unknown]  PCV13: Date [9/7/18]  PPSV23: Date [10/20/16]     Symptom Management:  Increased cough? no  Increased sputum production? no  Increased SOB? no     Exacerbation History:  When was your last hospitalization for an exacerbation? 3/2024  When was the last time you were treated with antibiotics and/or steroids? Prednisone taken 3/2024 after discharge     Smoking Cessation  Never        CHF ASSESSMENT     Staging:  Most recent ejection fraction: 60-65% as of 3/22/24     Symptoms/Monitoring:  Reported baseline weight: 171 lbs  Weighing  self everyday? Not currently- recommended  Weight changes? Recently lost about ~10 pounds over a gradual period of time  Edema? Has swelling in feet, but has recently went down.   Aware to contact cardiology if gaining 2-3 lbs/24 hrs OR 5 lbs/week? Yes  Shortness of breath? Difficult to determine, concurrent diagnosis of COPD     Diet/Lifestyle:  Following low-salt diet? No  Monitoring liquid intake? No     Guideline-Directed Medical Therapy:  ARNI: No  If no, then ACEi/ARB?: Yes, however, not taking  Beta Blocker: No  MRA: No  SGLT2i: No  Furosemide 40 mg daily  Diltiazem  mg daily  Digoxin 125 mcg daily  Blood Pressure: Appears decently controlled upon review of vitals, diastolic appears consistently lower in the 60s-80s. Denies any dizziness or symptoms of orthostatic hypotension     Secondary Prevention:  The 10-year ASCVD risk score (Varsha PENN, et al., 2019) is: 15.1%    Values used to calculate the score:      Age: 73 years      Sex: Male      Is Non- : No      Diabetic: No      Tobacco smoker: No      Systolic Blood Pressure: 116 mmHg      Is BP treated: Yes      HDL Cholesterol: 66 MG/DL      Total Cholesterol: 155 MG/DL   Aspirin 81mg? no  Statin?: No, most recent LDL 58 mg/dL as of 2/14/22  HTN?: Yes, describe: controlled     Historical Pharmacotherapy  Cannot recall previous pulmonary medications      PAP Eligibility Assessment  This patient has expressed a need for medication cost assistance and will be considered for the  Patient Assistance Program ( PAP). If patient is approved, the below medications would result in a $0 cost/month for the patient. If approved, patient must then provide updated financial documents each calendar year and have a new prescription issued by a  Meds pharmacist to continue receiving medications at $0 cost.     Medications  Trelegy     Eligibility Requirements  [x] Patient has pre-existing prescription coverage  [x] Medication(s) above  are listed on  PAP formulary  [x] Income: Less than or equal to 400% Federal Poverty Limit  Household Persons: 1  [x] Patient's prescription insurance is contracted with either  [facility] or  Specialty Pharmacy               2024* Poverty Guidelines for the [facility]   Persons in Family/Household Poverty Guideline Annual 400% Monthly 400%   1 $15,060 $60,240 $5,020   2 $20,440 $81,760 $6,813   3 $25,820 $103,280 $8,607   4 $31,200 $124,800 $10,400   5 $36,580 $146,320 $12,193   6 $41,960 $167,840 $13,987   7 $47,340 $189,360 $15,780   8 $52,720 $210,880 $17,573   *[facility] updates FPL guidelines in late January of each calendar year.       Initial Eligibility Determination  Note: Clinical Pharmacy is not responsible for finalizing eligibility approval. The  PAP team determines final eligibility.  This patient will likely qualify for the  PAP program.     Financial Documents Required for Application Submission  [x] 1040 Tax Form        Laboratory Results  Lab Results   Component Value Date    BILITOT 0.5 03/18/2024    CALCIUM 8.6 03/23/2024    CO2 31 03/23/2024    CL 97 03/23/2024    CREATININE 1.00 03/23/2024    GLUCOSE 141 (H) 03/23/2024    ALKPHOS 113 03/18/2024    K 4.9 03/23/2024    PROT 6.6 03/18/2024     03/23/2024    AST 27 03/18/2024    ALT 26 03/18/2024    BUN 26 (H) 03/23/2024    ANIONGAP 11 03/23/2024    MG 1.90 10/31/2023     (H) 06/28/2020    ALBUMIN 3.8 03/18/2024    LIPASE <16 (L) 12/31/2023    EGFR 80 03/23/2024     Lab Results   Component Value Date    TRIG 148 04/15/2024    CHOL 181 04/15/2024    HDL 62.0 04/15/2024     Lab Results   Component Value Date    HGBA1C 6.3 (H) 04/15/2024         Assessment/Plan   Problem List Items Addressed This Visit       Chronic obstructive pulmonary disease (Multi)    Congestive heart failure (Multi)     General Patient Discussion  Patient confirms he has received Trelegy inhaler from [facility]- patient  has been approved for  Patient Assistance Program 5/16/24 up through 5/16/25.  Spoke with sister prior to appointment, has concerns about patient knowing how to physically use his new Trelegy inhaler- would like an in-person visit with Dr. Jefferson/Dr. Jefferson's nurse for Trelegy demonstration and to confirm patient knows which medications this is replacing.   Discussed this pharmacist will connect with Dr. Jefferson's office to ensure patient receives hands-on education    Plan/Changes   Continue all meds under the continuation of care with the referring provider and clinical pharmacy team  Specialists: Patient currently sees outpatient pulmonology (Dr. Jefferson)  Determine if nurse visit with Dr. Jefferson's office a possibility    Next PCP Follow-Up  7/15/24    Next Clinical Pharmacy Follow-Up  Thursday, June 13th 2:30  Updates from pulmonology      Olga Lidia Henderson PharmD     Verbal consent to manage patient's drug therapy was obtained from the patient. They were informed they may decline to participate or withdraw from participation in pharmacy services at any time.

## 2024-06-05 ENCOUNTER — LAB (OUTPATIENT)
Dept: LAB | Facility: LAB | Age: 74
End: 2024-06-05
Payer: MEDICARE

## 2024-06-05 ENCOUNTER — ANTICOAGULATION - WARFARIN VISIT (OUTPATIENT)
Dept: CARDIOLOGY | Facility: HOSPITAL | Age: 74
End: 2024-06-05
Payer: MEDICARE

## 2024-06-05 DIAGNOSIS — I48.0 PAROXYSMAL ATRIAL FIBRILLATION (MULTI): ICD-10-CM

## 2024-06-05 DIAGNOSIS — I10 BENIGN HYPERTENSION: ICD-10-CM

## 2024-06-05 DIAGNOSIS — Z12.5 ENCOUNTER FOR PROSTATE CANCER SCREENING: ICD-10-CM

## 2024-06-05 DIAGNOSIS — I48.20 CHRONIC ATRIAL FIBRILLATION (MULTI): ICD-10-CM

## 2024-06-05 LAB
ALBUMIN SERPL-MCNC: 4.2 G/DL (ref 3.5–5)
ALP BLD-CCNC: 86 U/L (ref 35–125)
ALT SERPL-CCNC: 29 U/L (ref 5–40)
AST SERPL-CCNC: 21 U/L (ref 5–40)
BILIRUB DIRECT SERPL-MCNC: <0.2 MG/DL (ref 0–0.2)
BILIRUB SERPL-MCNC: 0.4 MG/DL (ref 0.1–1.2)
INR PPP: 2.2 (ref 0.9–1.2)
POC INR: 2.2
POC PROTHROMBIN TIME: NORMAL
PROT SERPL-MCNC: 6 G/DL (ref 5.9–7.9)
PROTHROMBIN TIME: 21.8 SECONDS (ref 9.3–12.7)
PSA SERPL-MCNC: 2.4 NG/ML

## 2024-06-05 PROCEDURE — 99211 OFF/OP EST MAY X REQ PHY/QHP: CPT | Performed by: INTERNAL MEDICINE

## 2024-06-05 PROCEDURE — 36415 COLL VENOUS BLD VENIPUNCTURE: CPT

## 2024-06-05 PROCEDURE — 85610 PROTHROMBIN TIME: CPT

## 2024-06-05 PROCEDURE — G0103 PSA SCREENING: HCPCS

## 2024-06-05 PROCEDURE — 85610 PROTHROMBIN TIME: CPT | Mod: QW

## 2024-06-05 PROCEDURE — 80076 HEPATIC FUNCTION PANEL: CPT

## 2024-06-05 NOTE — PROGRESS NOTES
Patient identification verified with 2 identifiers.    Location: [facility] - [facility]st Floor [facility] [address] [address]    Referring Physician: DR Abad  Enrollment/ Re-enrollment date: 2024   INR Goal: 2.0-3.0  INR monitoring is per [facility] protocol.  Anticoagulation Medication: warfarin  Indication: Atrial Fibrillation/Atrial Flutter    Subjective   Bleeding signs/symptoms: No    Bruising: No   Major bleeding event: No  Thrombosis signs/symptoms: No  Thromboembolic event: No  Missed doses: No  Extra doses: No  Medication changes: No  Dietary changes: No  Change in health: No  Change in activity: No  Alcohol: No  Other concerns: No    Upcoming Procedures:  Does the Patient Have any upcoming procedures that require interruption in anticoagulation therapy? no  Does the patient require bridging? no      Anticoagulation Summary  As of 6/5/2024      INR goal:  2.0-3.0   TTR:  63.1% (6 mo)   INR used for dosing:     Weekly warfarin total:  14 mg               Assessment/Plan   Therapeutic     1. New dose: no change    2. Next INR: 1 month      Education provided to patient during the visit:  Patient instructed to call in interim with questions, concerns and changes.

## 2024-06-13 ENCOUNTER — APPOINTMENT (OUTPATIENT)
Dept: PHARMACY | Facility: HOSPITAL | Age: 74
End: 2024-06-13
Payer: MEDICARE

## 2024-06-13 DIAGNOSIS — I50.32 CHRONIC DIASTOLIC CONGESTIVE HEART FAILURE (MULTI): ICD-10-CM

## 2024-06-13 DIAGNOSIS — J44.9 CHRONIC OBSTRUCTIVE PULMONARY DISEASE, UNSPECIFIED COPD TYPE (MULTI): ICD-10-CM

## 2024-06-13 NOTE — PROGRESS NOTES
Pharmacy Post-Discharge Visit  Reggie Munoz is a 73 y.o. male who was referred to the Clinical Pharmacy Team to complete a post-discharge medication optimization and monitoring visit.  The patient was referred for their COPD and Congestive Heart Failure.    Recent Hospitalization  Admission Date: 3/18/24  Discharge Date: 3/23/24  Discharge Diagnosis: COPD exacerbation     Referring Provider: Dr. Rajwinder Dawson    Preferred Pharmacy  [id number] [address] [place] [address] RD AT [facility] & [place]  [address] [facility] [address]  [facility] [address]  Phone: [phone] Fax: [phone]    [facility]  [address], [address]  [facility] [address]  Phone: [phone] Fax: [phone]    No Known Allergies    COPD ASSESSMENT     Current Medication Regimen  Spiriva 2.5 mcg/act, 2 puffs BID- not taking?  Pulmicort 180 mcg/act, 1 puff BID  Albuterol HFA PRN  DuoNebs PRN  Oxygen, 2L continuous     Inhaler Use:  How many times per week do you use your rescue inhaler? Usually twice daily (part of a routine)  How many days per week do you remember to use your maintenance inhaler? Unknown  Do you often miss the second dose (if applicable) of your maintenance inhaler? Unknown     Inhaler Technique:  How do you use your rescue inhaler? 2 times daily  How do you use your maintenance inhaler? N/A     Secondary Prevention (vaccines):  Influenza: Date [Unknown]  PCV13: Date [9/7/18]  PPSV23: Date [10/20/16]     Symptom Management:  Increased cough? no  Increased sputum production? no  Increased SOB? no     Exacerbation History:  When was your last hospitalization for an exacerbation? 3/2024  When was the last time you were treated with antibiotics and/or steroids? Prednisone taken 3/2024 after discharge     Smoking Cessation  Never        CHF ASSESSMENT     Staging:  Most recent ejection fraction: 60-65% as of 3/22/24     Symptoms/Monitoring:  Reported baseline weight: 171 lbs  Weighing  self everyday? Not currently- recommended  Weight changes? Recently lost about ~10 pounds over a gradual period of time  Edema? Has swelling in feet, but has recently went down.   Aware to contact cardiology if gaining 2-3 lbs/24 hrs OR 5 lbs/week? Yes  Shortness of breath? Difficult to determine, concurrent diagnosis of COPD     Diet/Lifestyle:  Following low-salt diet? No  Monitoring liquid intake? No     Guideline-Directed Medical Therapy:  ARNI: No  If no, then ACEi/ARB?: Yes, however, not taking  Beta Blocker: No  MRA: No  SGLT2i: No  Furosemide 40 mg daily  Diltiazem  mg daily  Digoxin 125 mcg daily  Blood Pressure: Appears decently controlled upon review of vitals, diastolic appears consistently lower in the 60s-80s. Denies any dizziness or symptoms of orthostatic hypotension     Secondary Prevention:  The 10-year ASCVD risk score (Varsha PENN, et al., 2019) is: 15.1%    Values used to calculate the score:      Age: 73 years      Sex: Male      Is Non- : No      Diabetic: No      Tobacco smoker: No      Systolic Blood Pressure: 116 mmHg      Is BP treated: Yes      HDL Cholesterol: 66 MG/DL      Total Cholesterol: 155 MG/DL   Aspirin 81mg? no  Statin?: No, most recent LDL 58 mg/dL as of 2/14/22  HTN?: Yes, describe: controlled     Historical Pharmacotherapy  Cannot recall previous pulmonary medications       Laboratory Results  Lab Results   Component Value Date    BILITOT 0.4 06/05/2024    CALCIUM 8.6 03/23/2024    CO2 31 03/23/2024    CL 97 03/23/2024    CREATININE 1.00 03/23/2024    GLUCOSE 141 (H) 03/23/2024    ALKPHOS 86 06/05/2024    K 4.9 03/23/2024    PROT 6.0 06/05/2024     03/23/2024    AST 21 06/05/2024    ALT 29 06/05/2024    BUN 26 (H) 03/23/2024    ANIONGAP 11 03/23/2024    MG 1.90 10/31/2023     (H) 06/28/2020    ALBUMIN 4.2 06/05/2024    LIPASE <16 (L) 12/31/2023    EGFR 80 03/23/2024     Lab Results   Component Value Date    TRIG 148 04/15/2024    CHOL  181 04/15/2024    HDL 62.0 04/15/2024     Lab Results   Component Value Date    HGBA1C 6.3 (H) 04/15/2024         Assessment/Plan   Problem List Items Addressed This Visit       Chronic obstructive pulmonary disease (Multi)    Relevant Orders    Follow Up In Clinical Pharmacy    Congestive heart failure (Multi)    Relevant Orders    Follow Up In Clinical Pharmacy     General Patient Discussion  Patient was able to have an in-office visit with Dr. Jefferson's staff (unsure if nurse or CNP) this past Tuesday 6/11/24 for demonstration on how to use Trelegy.   Discussed that patient would now no longer need maintenance inhalers Spiriva and Pulmicort, as Trelegy replaces these inhalers. Patient voiced understanding.   Reinforced that patient should be rinsing mouth out with water after each use. Patient voiced understanding.   Patient has a follow-up with Dr. Jefferson in July.     Plan/Changes   Continue all meds under the continuation of care with the referring provider and clinical pharmacy team  Specialists: Patient currently sees outpatient pulmonology (Dr. Jefferson).  Continue Trelegy 100-62.5-25 mcg, 1 puff once daily    Next Clinical Pharmacy Follow-Up  ~2 months      Olga Lidia Henderson, PharmD     Verbal consent to manage patient's drug therapy was obtained from the patient. They were informed they may decline to participate or withdraw from participation in pharmacy services at any time.

## 2024-06-18 ENCOUNTER — PATIENT OUTREACH (OUTPATIENT)
Dept: PRIMARY CARE | Facility: CLINIC | Age: 74
End: 2024-06-18
Payer: MEDICARE

## 2024-06-18 NOTE — PROGRESS NOTES
Final call.  CM called and spoke with pt's sister to address any final questions or concerns regarding hospitalization.  She reports her brother is   doing well and has no concerns at this time.  Pt given my contact info  in the event questions should arise

## 2024-07-02 ENCOUNTER — DOCUMENTATION (OUTPATIENT)
Dept: PHYSICAL THERAPY | Facility: CLINIC | Age: 74
End: 2024-07-02
Payer: MEDICARE

## 2024-07-02 NOTE — PROGRESS NOTES
Physical Therapy    Discharge Summary    Name: Reggie Munoz  MRN: [id number]  : 1950  Date: 24    Discharge Summary: PT    Discharge Information: Date of evaluation 2024 and Number of attended visits 1    Therapy Summary: Pt did not require further outpatient PT. I highly recommend he undergo home PT at this time.    Discharge Status: Return to MD as needed.     Rehab Discharge Reason: Other home PT preferred

## 2024-07-03 ENCOUNTER — ANTICOAGULATION - WARFARIN VISIT (OUTPATIENT)
Dept: CARDIOLOGY | Facility: HOSPITAL | Age: 74
End: 2024-07-03
Payer: MEDICARE

## 2024-07-03 DIAGNOSIS — I48.0 PAROXYSMAL ATRIAL FIBRILLATION (MULTI): ICD-10-CM

## 2024-07-03 LAB
POC INR: 1.7
POC PROTHROMBIN TIME: NORMAL

## 2024-07-03 PROCEDURE — 99211 OFF/OP EST MAY X REQ PHY/QHP: CPT | Performed by: INTERNAL MEDICINE

## 2024-07-03 PROCEDURE — 85610 PROTHROMBIN TIME: CPT | Mod: QW

## 2024-07-03 NOTE — PROGRESS NOTES
Patient identification verified with 2 identifiers.    Location: [facility] - 1st Floor Anticoagulation Clinic [address] [id number]    Referring Physician: DR Abad  Enrollment/ Re-enrollment date:    INR Goal: 2.0-3.0  INR monitoring is per [facility] protocol.  Anticoagulation Medication: warfarin  Indication: Atrial Fibrillation/Atrial Flutter    Subjective   Bleeding signs/symptoms: No    Bruising: No   Major bleeding event: No  Thrombosis signs/symptoms: No  Thromboembolic event: No  Missed doses: No  missed a dose on   Extra doses: No  Medication changes: No  Dietary changes: No  Change in health: No  Change in activity: No  Alcohol: No  Other concerns: No    Upcoming Procedures:  Does the Patient Have any upcoming procedures that require interruption in anticoagulation therapy? no  Does the patient require bridging? no      Anticoagulation Summary  As of 7/3/2024      INR goal:  2.0-3.0   TTR:  64.8% (7.9 mo)   INR used for dosin.70 (7/3/2024)   Weekly warfarin total:  14 mg               Assessment/Plan   Subtherapeutic     1. New dose:  one time dose increase     2. Next INR: 2 weeks      Education provided to patient during the visit:  Patient instructed to call in interim with questions, concerns and changes.

## 2024-07-15 ENCOUNTER — APPOINTMENT (OUTPATIENT)
Dept: PRIMARY CARE | Facility: CLINIC | Age: 74
End: 2024-07-15
Payer: MEDICARE

## 2024-07-15 ENCOUNTER — LAB (OUTPATIENT)
Dept: LAB | Facility: LAB | Age: 74
End: 2024-07-15
Payer: MEDICARE

## 2024-07-15 VITALS
SYSTOLIC BLOOD PRESSURE: 100 MMHG | DIASTOLIC BLOOD PRESSURE: 60 MMHG | HEIGHT: 67 IN | BODY MASS INDEX: 24.64 KG/M2 | RESPIRATION RATE: 16 BRPM | OXYGEN SATURATION: 94 % | HEART RATE: 69 BPM | WEIGHT: 157 LBS

## 2024-07-15 DIAGNOSIS — D22.9 SKIN MOLE: ICD-10-CM

## 2024-07-15 DIAGNOSIS — I50.42 CHRONIC COMBINED SYSTOLIC AND DIASTOLIC CONGESTIVE HEART FAILURE (MULTI): ICD-10-CM

## 2024-07-15 DIAGNOSIS — J44.9 SEVERE CHRONIC OBSTRUCTIVE PULMONARY DISEASE (MULTI): Primary | ICD-10-CM

## 2024-07-15 DIAGNOSIS — I48.20 CHRONIC ATRIAL FIBRILLATION (MULTI): ICD-10-CM

## 2024-07-15 DIAGNOSIS — J44.9 CHRONIC OBSTRUCTIVE PULMONARY DISEASE, UNSPECIFIED COPD TYPE (MULTI): ICD-10-CM

## 2024-07-15 DIAGNOSIS — H61.23 BILATERAL IMPACTED CERUMEN: ICD-10-CM

## 2024-07-15 LAB
ANION GAP SERPL CALC-SCNC: 18 MMOL/L
BASOPHILS # BLD AUTO: 0.09 X10*3/UL (ref 0–0.1)
BASOPHILS NFR BLD AUTO: 0.7 %
BUN SERPL-MCNC: 37 MG/DL (ref 8–25)
CALCIUM SERPL-MCNC: 9.9 MG/DL (ref 8.5–10.4)
CHLORIDE SERPL-SCNC: 98 MMOL/L (ref 97–107)
CO2 SERPL-SCNC: 24 MMOL/L (ref 24–31)
CREAT SERPL-MCNC: 1.5 MG/DL (ref 0.4–1.6)
EGFRCR SERPLBLD CKD-EPI 2021: 49 ML/MIN/1.73M*2
EOSINOPHIL # BLD AUTO: 1.6 X10*3/UL (ref 0–0.4)
EOSINOPHIL NFR BLD AUTO: 12.9 %
ERYTHROCYTE [DISTWIDTH] IN BLOOD BY AUTOMATED COUNT: 13.4 % (ref 11.5–14.5)
GLUCOSE SERPL-MCNC: 89 MG/DL (ref 65–99)
HCT VFR BLD AUTO: 45.9 % (ref 41–52)
HGB BLD-MCNC: 15.1 G/DL (ref 13.5–17.5)
IMM GRANULOCYTES # BLD AUTO: 0.07 X10*3/UL (ref 0–0.5)
IMM GRANULOCYTES NFR BLD AUTO: 0.6 % (ref 0–0.9)
LYMPHOCYTES # BLD AUTO: 1.85 X10*3/UL (ref 0.8–3)
LYMPHOCYTES NFR BLD AUTO: 14.9 %
MCH RBC QN AUTO: 30 PG (ref 26–34)
MCHC RBC AUTO-ENTMCNC: 32.9 G/DL (ref 32–36)
MCV RBC AUTO: 91 FL (ref 80–100)
MONOCYTES # BLD AUTO: 1.08 X10*3/UL (ref 0.05–0.8)
MONOCYTES NFR BLD AUTO: 8.7 %
NEUTROPHILS # BLD AUTO: 7.73 X10*3/UL (ref 1.6–5.5)
NEUTROPHILS NFR BLD AUTO: 62.2 %
NRBC BLD-RTO: 0 /100 WBCS (ref 0–0)
PLATELET # BLD AUTO: 251 X10*3/UL (ref 150–450)
POTASSIUM SERPL-SCNC: 4.4 MMOL/L (ref 3.4–5.1)
RBC # BLD AUTO: 5.03 X10*6/UL (ref 4.5–5.9)
SODIUM SERPL-SCNC: 140 MMOL/L (ref 133–145)
WBC # BLD AUTO: 12.4 X10*3/UL (ref 4.4–11.3)

## 2024-07-15 PROCEDURE — 99214 OFFICE O/P EST MOD 30 MIN: CPT | Performed by: INTERNAL MEDICINE

## 2024-07-15 PROCEDURE — 3074F SYST BP LT 130 MM HG: CPT | Performed by: INTERNAL MEDICINE

## 2024-07-15 PROCEDURE — 3078F DIAST BP <80 MM HG: CPT | Performed by: INTERNAL MEDICINE

## 2024-07-15 PROCEDURE — 1126F AMNT PAIN NOTED NONE PRSNT: CPT | Performed by: INTERNAL MEDICINE

## 2024-07-15 PROCEDURE — 69210 REMOVE IMPACTED EAR WAX UNI: CPT | Performed by: INTERNAL MEDICINE

## 2024-07-15 PROCEDURE — 80048 BASIC METABOLIC PNL TOTAL CA: CPT

## 2024-07-15 PROCEDURE — 85025 COMPLETE CBC W/AUTO DIFF WBC: CPT

## 2024-07-15 PROCEDURE — 1159F MED LIST DOCD IN RCRD: CPT | Performed by: INTERNAL MEDICINE

## 2024-07-15 PROCEDURE — 1036F TOBACCO NON-USER: CPT | Performed by: INTERNAL MEDICINE

## 2024-07-15 PROCEDURE — 36415 COLL VENOUS BLD VENIPUNCTURE: CPT

## 2024-07-15 PROCEDURE — 1157F ADVNC CARE PLAN IN RCRD: CPT | Performed by: INTERNAL MEDICINE

## 2024-07-15 RX ORDER — FLUTICASONE FUROATE, UMECLIDINIUM BROMIDE AND VILANTEROL TRIFENATATE 100; 62.5; 25 UG/1; UG/1; UG/1
POWDER RESPIRATORY (INHALATION)
Qty: 60 EACH | Refills: 11 | Status: SHIPPED | OUTPATIENT
Start: 2024-07-15

## 2024-07-15 ASSESSMENT — ENCOUNTER SYMPTOMS
COUGH: 0
DIARRHEA: 0
LOSS OF SENSATION IN FEET: 0
NAUSEA: 0
PALPITATIONS: 0
CONSTIPATION: 0
OCCASIONAL FEELINGS OF UNSTEADINESS: 0
DIZZINESS: 0
ARTHRALGIAS: 0
SHORTNESS OF BREATH: 0
ABDOMINAL PAIN: 0
DEPRESSION: 0

## 2024-07-15 ASSESSMENT — PAIN SCALES - GENERAL: PAINLEVEL: 0-NO PAIN

## 2024-07-15 NOTE — PROGRESS NOTES
"Subjective   Patient ID: Reggie Munoz is a 73 y.o. male who presents for 3 month check up.    Patient presents today hearing crunching in his ears whenever he eats. Breathing improved with Trelegy.  He lives by himself.  His pastors wife is here with him.  He does not drive.  He takes his medications from the bottles, we discussed getting a pillbox    Diagnostics Reviewed:  Labs Reviewed: 4/2024 A1c 6.3, LDL low but lipid panel otherwise nml. 3/2024 liver enzyme nml.         Review of Systems   Respiratory:  Negative for cough and shortness of breath.    Cardiovascular:  Negative for chest pain and palpitations.   Gastrointestinal:  Negative for abdominal pain, constipation, diarrhea and nausea.   Musculoskeletal:  Negative for arthralgias.   Neurological:  Negative for dizziness.       Objective   /60   Pulse 69   Resp 16   Ht 1.702 m (5' 7\")   Wt 71.2 kg (157 lb)   SpO2 94%   BMI 24.59 kg/m²     Physical Exam  HENT:      Right Ear: Tympanic membrane normal. There is impacted cerumen.      Left Ear: Tympanic membrane normal. There is impacted cerumen.   Cardiovascular:      Rate and Rhythm: Normal rate and regular rhythm.      Heart sounds: Normal heart sounds.   Pulmonary:      Breath sounds: Normal breath sounds.   Abdominal:      Palpations: Abdomen is soft. There is no hepatomegaly.      Tenderness: There is no abdominal tenderness.   Musculoskeletal:      Right lower leg: No edema.      Left lower leg: No edema.   Skin:     Comments: Skin mole on right temple   Neurological:      Mental Status: He is alert and oriented to person, place, and time.      Gait: Gait normal.   Psychiatric:         Mood and Affect: Mood normal.         Behavior: Behavior normal.         Assessment/Plan   Diagnoses and all orders for this visit:  Severe chronic obstructive pulmonary disease (Multi)  Bilateral impacted cerumen  -     carbamide peroxide (Debrox) 6.5 % otic solution; Administer 5 drops into each ear " every other day.  Chronic combined systolic and diastolic congestive heart failure (Multi)  -     CBC and Auto Differential; Future  -     Basic metabolic panel; Future  Chronic atrial fibrillation (Multi)  Chronic obstructive pulmonary disease, unspecified COPD type (Multi)  -     fluticasone-umeclidin-vilanter (Trelegy Ellipta) 100-62.5-25 mcg blister with device; Inhale 1 puff by mouth once daily. Rinse mouth out with water after each use.  Skin mole  -     Referral to Dermatology       Scribe Attestation  By signing my name below, I, Jeannine Payan   attest that this documentation has been prepared under the direction and in the presence of Rajwinder Dawson MD.

## 2024-07-24 ENCOUNTER — ANTICOAGULATION - WARFARIN VISIT (OUTPATIENT)
Dept: CARDIOLOGY | Facility: HOSPITAL | Age: 74
End: 2024-07-24
Payer: MEDICARE

## 2024-07-24 DIAGNOSIS — I48.0 PAROXYSMAL ATRIAL FIBRILLATION (MULTI): ICD-10-CM

## 2024-07-24 LAB
POC INR: 2
POC PROTHROMBIN TIME: NORMAL

## 2024-07-24 PROCEDURE — 99211 OFF/OP EST MAY X REQ PHY/QHP: CPT | Performed by: INTERNAL MEDICINE

## 2024-07-24 PROCEDURE — 85610 PROTHROMBIN TIME: CPT | Mod: QW

## 2024-07-24 NOTE — PROGRESS NOTES
Patient identification verified with 2 identifiers.    Location: [facility] - 1st Floor Anticoagulation Clinic [address] [id number]    Referring Physician: DR Abad  Enrollment/ Re-enrollment date: 2024   INR Goal: 2.0-3.0  INR monitoring is per [facility] protocol.  Anticoagulation Medication: warfarin  Indication: Atrial Fibrillation/Atrial Flutter    Subjective   Bleeding signs/symptoms: No    Bruising: No   Major bleeding event: No  Thrombosis signs/symptoms: No  Thromboembolic event: No  Missed doses: No  Extra doses: No  Medication changes: No  Dietary changes: No  Change in health: No  Change in activity: No  Alcohol: No  Other concerns: No    Upcoming Procedures:  Does the Patient Have any upcoming procedures that require interruption in anticoagulation therapy? no  Does the patient require bridging? no      Anticoagulation Summary  As of 7/24/2024      INR goal:  2.0-3.0   TTR:  64.8% (7.9 mo)   INR used for dosing:  --   Weekly warfarin total:  14 mg               Assessment/Plan   Therapeutic     1. New dose: no change    2. Next INR: 1 month      Education provided to patient during the visit:  Patient instructed to call in interim with questions, concerns and changes.

## 2024-07-26 ENCOUNTER — TELEPHONE (OUTPATIENT)
Dept: PHARMACY | Facility: HOSPITAL | Age: 74
End: 2024-07-26
Payer: MEDICARE

## 2024-07-26 DIAGNOSIS — J44.9 CHRONIC OBSTRUCTIVE PULMONARY DISEASE, UNSPECIFIED COPD TYPE (MULTI): Primary | ICD-10-CM

## 2024-07-26 NOTE — TELEPHONE ENCOUNTER
Returning voicemail from patient's sister:  No longer receiving Trelegy in the mail   Now paying ~$100 for Trelegy despite being enrolled in the  Patient Assistance Program    LVM with sister- appears Rx at [facility] (on autofill) was cancelled and re-sent to a local [facility].  PAP $0 copay program only applies to Rxs that are filled via the [facility]- thus, patient now required to pay for Trelegy at outside pharmacy.     Left this pharmacist's phone number with patient's sister, requested call back to further discuss.    no 26-Feb-2024 06:11

## 2024-08-05 RX ORDER — FLUTICASONE FUROATE, UMECLIDINIUM BROMIDE AND VILANTEROL TRIFENATATE 100; 62.5; 25 UG/1; UG/1; UG/1
POWDER RESPIRATORY (INHALATION)
Qty: 180 EACH | Refills: 3 | Status: SHIPPED | OUTPATIENT
Start: 2024-08-05

## 2024-08-05 NOTE — TELEPHONE ENCOUNTER
Returning voicemail from patient's sisterVelia (requested call back).    Discussion  Sister clarifies Rx was not canceled, just re-ordered and sent to [facility] pharmacy  Reggie believes Gregory is helping much more, per sister  Also per sister, recent visit with Dr. Jefferson (pulmonology) went very well, Dr. Jefferson pleased with Treshangy use    Will re-send Rx for  Patient Assistance to [facility] to mail-out and will add notation to Rx in chart.

## 2024-08-06 PROCEDURE — RXMED WILLOW AMBULATORY MEDICATION CHARGE

## 2024-08-08 ENCOUNTER — PHARMACY VISIT (OUTPATIENT)
Dept: PHARMACY | Facility: CLINIC | Age: 74
End: 2024-08-08
Payer: COMMERCIAL

## 2024-08-15 ENCOUNTER — APPOINTMENT (OUTPATIENT)
Dept: PHARMACY | Facility: HOSPITAL | Age: 74
End: 2024-08-15
Payer: MEDICARE

## 2024-08-15 DIAGNOSIS — I50.32 CHRONIC DIASTOLIC CONGESTIVE HEART FAILURE (MULTI): ICD-10-CM

## 2024-08-15 DIAGNOSIS — J44.9 CHRONIC OBSTRUCTIVE PULMONARY DISEASE, UNSPECIFIED COPD TYPE (MULTI): ICD-10-CM

## 2024-08-15 NOTE — PROGRESS NOTES
Pharmacy Post-Discharge Visit  Reggie Munoz is a 73 y.o. male who was referred to the Clinical Pharmacy Team to complete a post-discharge medication optimization and monitoring visit.  The patient was referred for their Congestive Heart Failure and COPD.    Recent Hospitalization  Admission Date: 3/18/24  Discharge Date: 3/23/24  Discharge Diagnosis: COPD exacerbation     Referring Provider: Dr. Rajwinder Dawson    Had a very brief discussion with patient's sister, Velia, to ensure that Trelegy cost has been worked out. Velia confirms. Also confirms that patient has not had any major lower-limb swelling.     Discussed that Clinical Pharmacy will reach out to patient in ~1 year (or less) to begin the re-enrolling process for  PAP at that time.     Encouraged to call this pharmacist with any questions/concerns.       Assessment/Plan   Problem List Items Addressed This Visit       Chronic obstructive pulmonary disease (Multi)    Congestive heart failure (Multi)       Plan/Changes   Continue all meds under the continuation of care with the referring provider and clinical pharmacy team    Next Clinical Pharmacy Follow-Up  ~1 year or less for [facility] PAP follow-up.       Olga Lidia Henderson, Steven     Verbal consent to manage patient's drug therapy was obtained from the patient. They were informed they may decline to participate or withdraw from participation in pharmacy services at any time.

## 2024-08-21 ENCOUNTER — ANTICOAGULATION - WARFARIN VISIT (OUTPATIENT)
Dept: CARDIOLOGY | Facility: HOSPITAL | Age: 74
End: 2024-08-21
Payer: MEDICARE

## 2024-08-21 DIAGNOSIS — I48.0 PAROXYSMAL ATRIAL FIBRILLATION (MULTI): ICD-10-CM

## 2024-08-21 LAB
POC INR: 1.7
POC PROTHROMBIN TIME: NORMAL

## 2024-08-21 PROCEDURE — 85610 PROTHROMBIN TIME: CPT | Mod: QW

## 2024-08-21 PROCEDURE — 99211 OFF/OP EST MAY X REQ PHY/QHP: CPT | Performed by: INTERNAL MEDICINE

## 2024-08-21 NOTE — PROGRESS NOTES
Patient identification verified with 2 identifiers.    Location: [facility] - 1st Floor Anticoagulation Clinic [address] [id number]    Referring Physician: DR Abad  Enrollment/ Re-enrollment date:    INR Goal: 2.0-3.0  INR monitoring is per [facility] protocol.  Anticoagulation Medication: warfarin  Indication: Atrial Fibrillation/Atrial Flutter    Subjective   Bleeding signs/symptoms: No    Bruising: No   Major bleeding event: No  Thrombosis signs/symptoms: No  Thromboembolic event: No  Missed doses: No  Extra doses: No  Medication changes: No  Dietary changes: No  Change in health: No  Change in activity: No  Alcohol: No  Other concerns: No    Upcoming Procedures:  Does the Patient Have any upcoming procedures that require interruption in anticoagulation therapy? no  Does the patient require bridging? no      Anticoagulation Summary  As of 2024      INR goal:  2.0-3.0   TTR:  53.6% (9.5 mo)   INR used for dosin.70 (2024)   Weekly warfarin total:  15 mg               Assessment/Plan   Subtherapeutic     1. New dose:  dose increased     2. Next INR: 2 weeks      Education provided to patient during the visit:  Patient instructed to call in interim with questions, concerns and changes.        
23-Mar-2021 14:34

## 2024-09-04 ENCOUNTER — ANTICOAGULATION - WARFARIN VISIT (OUTPATIENT)
Dept: CARDIOLOGY | Facility: HOSPITAL | Age: 74
End: 2024-09-04
Payer: MEDICARE

## 2024-09-04 DIAGNOSIS — I48.0 PAROXYSMAL ATRIAL FIBRILLATION (MULTI): ICD-10-CM

## 2024-09-04 LAB
POC INR: 1.7
POC PROTHROMBIN TIME: NORMAL

## 2024-09-04 PROCEDURE — 85610 PROTHROMBIN TIME: CPT | Mod: QW

## 2024-09-04 PROCEDURE — 99211 OFF/OP EST MAY X REQ PHY/QHP: CPT | Performed by: INTERNAL MEDICINE

## 2024-09-04 NOTE — PROGRESS NOTES
Patient identification verified with 2 identifiers.    Location: [facility] - 1st Floor Anticoagulation Clinic [address] [id number]    Referring Physician: DR Dawson  Enrollment/ Re-enrollment date:    INR Goal: 2.0-3.0  INR monitoring is per [facility] protocol.  Anticoagulation Medication: warfarin  Indication: Atrial Fibrillation/Atrial Flutter    Subjective   Bleeding signs/symptoms: No    Bruising: No   Major bleeding event: No  Thrombosis signs/symptoms: No  Thromboembolic event: No  Missed doses: No  Extra doses: No  Medication changes: No  Dietary changes: No  Change in health: No  Change in activity: No  Alcohol: No  Other concerns: No    Upcoming Procedures:  Does the Patient Have any upcoming procedures that require interruption in anticoagulation therapy? no  Does the patient require bridging? no      Anticoagulation Summary  As of 2024      INR goal:  2.0-3.0   TTR:  51.1% (10 mo)   INR used for dosin.70 (2024)   Weekly warfarin total:  17 mg               Assessment/Plan   Subtherapeutic     1. New dose:  dose increased     2. Next INR: 2 weeks      Education provided to patient during the visit:  Patient instructed to call in interim with questions, concerns and changes.

## 2024-09-17 ENCOUNTER — APPOINTMENT (OUTPATIENT)
Dept: OTOLARYNGOLOGY | Facility: CLINIC | Age: 74
End: 2024-09-17
Payer: MEDICARE

## 2024-09-17 VITALS — BODY MASS INDEX: 23.54 KG/M2 | HEIGHT: 67 IN | TEMPERATURE: 97.2 F | WEIGHT: 150 LBS

## 2024-09-17 DIAGNOSIS — H90.0 CONDUCTIVE HEARING LOSS, BILATERAL: Primary | ICD-10-CM

## 2024-09-17 DIAGNOSIS — H91.8X3 OTHER SPECIFIED HEARING LOSS, BILATERAL: ICD-10-CM

## 2024-09-17 DIAGNOSIS — J33.9 NASAL POLYPS: ICD-10-CM

## 2024-09-17 DIAGNOSIS — J31.0 CHRONIC RHINITIS: ICD-10-CM

## 2024-09-17 DIAGNOSIS — R09.81 NASAL CONGESTION: ICD-10-CM

## 2024-09-17 PROCEDURE — 1036F TOBACCO NON-USER: CPT | Performed by: OTOLARYNGOLOGY

## 2024-09-17 PROCEDURE — 1157F ADVNC CARE PLAN IN RCRD: CPT | Performed by: OTOLARYNGOLOGY

## 2024-09-17 PROCEDURE — 99214 OFFICE O/P EST MOD 30 MIN: CPT | Performed by: OTOLARYNGOLOGY

## 2024-09-17 PROCEDURE — 1159F MED LIST DOCD IN RCRD: CPT | Performed by: OTOLARYNGOLOGY

## 2024-09-17 PROCEDURE — 3008F BODY MASS INDEX DOCD: CPT | Performed by: OTOLARYNGOLOGY

## 2024-09-17 RX ORDER — FLUTICASONE PROPIONATE 50 MCG
SPRAY, SUSPENSION (ML) NASAL
Qty: 1 ML | Refills: 11 | Status: SHIPPED | OUTPATIENT
Start: 2024-09-17

## 2024-09-17 NOTE — PROGRESS NOTES
Chief Complaint   Patient presents with    Follow-up     Follow up     hears a crunching noise in his ears     HPI:  Reggie Munoz is a 73 y.o. male who presents with some complaints recently of some hearing loss, ear pressure, and some left ear clicking.  No pain, drainage, dizziness.  States he can breathe good through his nose but does have some drainage in the morning.  Not currently using any nasal sprays.  Has a significant history of nasal polyposis and is post bilateral endoscopic sinus surgery by Dr. Armstrong on December 6, 2021.    PMH:  Past Medical History:   Diagnosis Date    A-fib (Multi)     COPD (chronic obstructive pulmonary disease) (Multi)     Hyperlipidemia     Hypertension     Low back pain     Nonexudative age-related macular degeneration, right eye, early dry stage     Osteopenia     Other secondary cataract, bilateral     Personal history of other diseases of the circulatory system     History of angina    Personal history of other diseases of the circulatory system     History of hypertension    Personal history of other diseases of the respiratory system     History of chronic obstructive lung disease    Pseudophakia     Refractive error     Spinal stenosis     Unspecified asthma with (acute) exacerbation ([facility])     Asthma with acute exacerbation, unspecified asthma severity, unspecified whether persistent     Past Surgical History:   Procedure Laterality Date    CATARACT EXTRACTION      IR ANGIOGRAM PULMONARY Bilateral 04/10/2012    IR ANGIOGRAM PULMONARY [facility]    OTHER SURGICAL HISTORY  07/23/2021    No history of surgery    SINUS SURGERY           Medications:     Current Outpatient Medications:     albuterol 90 mcg/actuation inhaler, Inhale 2 puffs every 6 hours if needed for wheezing or shortness of breath., Disp: 8 g, Rfl: 0    atorvastatin (Lipitor) 10 mg tablet, Take 1 tablet (10 mg) by mouth once daily at bedtime., Disp: 90 tablet, Rfl: 3    cholecalciferol  "(Vitamin D-3) 50 MCG (2000 UT) tablet, Take 1 tablet (50 mcg) by mouth once daily., Disp: 30 tablet, Rfl: 11    digoxin (Lanoxin) 125 MCG tablet, TAKE 1 TABLET BY MOUTH DAILY, Disp: 90 tablet, Rfl: 3    fluticasone-umeclidin-vilanter (Trelegy Ellipta) 100-62.5-25 mcg blister with device, Inhale 1 puff by mouth once daily. Rinse mouth out with water after each use., Disp: 180 each, Rfl: 3    furosemide (Lasix) 40 mg tablet, Take 1 tablet (40 mg) by mouth once daily., Disp: 90 tablet, Rfl: 3    ipratropium-albuteroL (Duo-Neb) 0.5-2.5 mg/3 mL nebulizer solution, Take 3 mL by nebulization every 8 hours if needed for wheezing or shortness of breath., Disp: 270 mL, Rfl: 1    losartan-hydrochlorothiazide (Hyzaar) 50-12.5 mg tablet, TAKE 1 TABLET DAILY, Disp: 90 tablet, Rfl: 3    nebulizers misc, 1 Units every 6 hours if needed (SOB)., Disp: 1 each, Rfl: 0    oxygen (O2) gas therapy, Inhale 1 each continuously., Disp: , Rfl:     predniSONE (Deltasone) 10 mg tablet, Take 1 tablet (10 mg) by mouth see administration instructions. Take 2 tablets BID x 5 days, 1 tablet BID x 5 days, then 1 tablet daily (Patient taking differently: Take 1 tablet (10 mg) by mouth every other day.), Disp: 30 tablet, Rfl: 1    warfarin (Coumadin) 2 mg tablet, Take 1 tablet (2 mg) by mouth once daily at bedtime. Take as directed per After Visit Summary., Disp: 90 tablet, Rfl: 3    dilTIAZem CD (Cardizem CD) 180 mg 24 hr capsule, TAKE 1 CAPSULE BY MOUTH DAILY, Disp: 90 capsule, Rfl: 3    fluticasone (Flonase) 50 mcg/actuation nasal spray, 2 sprays each nostril once daily, 1 bottle, 11 refills, Disp: 1 mL, Rfl: 11     Allergies:  No Known Allergies     ROS:  Review of systems normal unless stated otherwise in the HPI and/or PMH.    Physical Exam:  Temperature 36.2 °C (97.2 °F), height 1.702 m (5' 7\"), weight 68 kg (150 lb). Body mass index is 23.49 kg/m².     GENERAL APPEARANCE: Well developed and well nourished.  Alert and oriented in no acute " distress.  Normal vocal quality.      HEAD/FACE: No erythema or edema or facial tenderness.  Normal facial nerve function bilaterally.    EAR:       EXTERNAL: Normal pinnas and external auditory canals with bilateral distracting wax and hair impaction removed by our nurse practitioner.       MIDDLE EAR: Tympanic membranes intact and mobile with normal landmarks.  Middle ear space appears well aerated.  Normal pneumatic otoscopy       TUBE STATUS: N/A       MASTOID CAVITY: N/A       HEARING: Gross hearing assessment is within normal limits.      NOSE:       VISUALIZED USING: Anterior rhinoscopy with headlight and nasal speculum.       DORSUM: Midline, nontraumatic appearance.       MUCOSA: Significant obstructing bilateral nasal polyposis.       SECRETIONS: Normal.       SEPTUM: Midline and nonobstructing.       INFERIOR TURBINATES: Normal.       MIDDLE TURBINATES/MEATUS: N/A       BLEEDING: N/A         ORAL CAVITY/PHARYNX:       TEETH: Adequate dentition.       TONGUE: No mass or lesion.  Normal mobility.       FLOOR OF MOUTH: No mass or lesion.       PALATE: Normal hard palate, soft palate, and uvula.       OROPHARYNX: Normal without mass or lesion.       BUCCAL MUCOSA/GBS: Normal without mass or lesion.       LIPS: Normal.    LARYNX/HYPOPHARYNX/NASOPHARYNX: N/A    NECK: No palpable masses or abnormal adenopathy.  Trachea is midline.    THYROID: No thyromegaly or palpable nodule.    SALIVARY GLANDS: Normal bilateral parotid and submandibular glands by inspection and palpation.    TMJ's: Normal.    NEURO: Cranial nerve exam grossly normal bilaterally.       Assessment/Plan   Reggie was seen today for follow-up.  Diagnoses and all orders for this visit:  Conductive hearing loss, bilateral (Primary)  Other specified hearing loss, bilateral  Nasal polyps  Chronic rhinitis  Nasal congestion  -     fluticasone (Flonase) 50 mcg/actuation nasal spray; 2 sprays each nostril once daily, 1 bottle, 11 refills     Both ears  cleaned of impacted wax.  This should help although he does complain of some clicking as well especially jaw movement.  May be referred to musculoskeletal in nature.  Normal examination after cleaning the wax of his ears.  Recommend audiogram.  He does have some significant recurrent nasal polyposis.  Educated about this.  He does not seem significantly bothered by it other than some drainage in the morning.  Will get him back on some Flonase and have him follow-up to see Dr. Armstrong in about 6 weeks for continued evaluation of his chronic nasal polyposis.  Follow up in about 6 weeks (around 10/29/2024).     Maurice Juárez MD

## 2024-09-18 ENCOUNTER — ANTICOAGULATION - WARFARIN VISIT (OUTPATIENT)
Dept: CARDIOLOGY | Facility: HOSPITAL | Age: 74
End: 2024-09-18
Payer: MEDICARE

## 2024-09-18 DIAGNOSIS — I48.0 PAROXYSMAL ATRIAL FIBRILLATION (MULTI): ICD-10-CM

## 2024-09-18 LAB
POC INR: 2.9
POC PROTHROMBIN TIME: NORMAL

## 2024-09-18 PROCEDURE — 85610 PROTHROMBIN TIME: CPT | Mod: QW

## 2024-09-18 PROCEDURE — 99211 OFF/OP EST MAY X REQ PHY/QHP: CPT | Performed by: INTERNAL MEDICINE

## 2024-09-18 NOTE — PROGRESS NOTES
Patient identification verified with 2 identifiers.    [place] [facility] [place] [facility] [place] 1st Floor Anticoagulation Clinic [address] [id number]    Referring Physician: Dr Quarles  Enrollment/ Re-enrollment date:    INR Goal: 2.0-3.0  INR monitoring is per [facility] protocol.  Anticoagulation Medication: warfarin  Indication: Atrial Fibrillation/Atrial Flutter    Subjective   Bleeding signs/symptoms: No    Bruising: No   Major bleeding event: No  Thrombosis signs/symptoms: No  Thromboembolic event: No  Missed doses: No  Extra doses: No  Medication changes: No  Dietary changes: No  Change in health: No  Change in activity: No  Alcohol: No  Other concerns: No    Upcoming Procedures:  Does the Patient Have any upcoming procedures that require interruption in anticoagulation therapy? no  Does the patient require bridging? no      Anticoagulation Summary  As of 2024      INR goal:  2.0-3.0   TTR:  52.2% (10.4 mo)   INR used for dosin.90 (2024)   Weekly warfarin total:  20 mg               Assessment/Plan   Therapeutic     1. New dose:  dosing changed to reflect what he was currently taking     2. Next INR: 3 weeks      Education provided to patient during the visit:  Patient instructed to call in interim with questions, concerns and changes.

## 2024-10-09 ENCOUNTER — ANTICOAGULATION - WARFARIN VISIT (OUTPATIENT)
Dept: CARDIOLOGY | Facility: HOSPITAL | Age: 74
End: 2024-10-09
Payer: MEDICARE

## 2024-10-09 DIAGNOSIS — I48.0 PAROXYSMAL ATRIAL FIBRILLATION (MULTI): ICD-10-CM

## 2024-10-09 LAB
POC INR: 3.9
POC PROTHROMBIN TIME: NORMAL

## 2024-10-09 PROCEDURE — 99211 OFF/OP EST MAY X REQ PHY/QHP: CPT | Performed by: INTERNAL MEDICINE

## 2024-10-09 PROCEDURE — 85610 PROTHROMBIN TIME: CPT | Mod: QW

## 2024-10-09 NOTE — PROGRESS NOTES
Patient identification verified with 2 identifiers.    Location: [facility] - 1st Floor Anticoagulation Clinic [address] [id number]    Referring Physician: DR Abad  Enrollment/ Re-enrollment date: 2024   INR Goal: 2.0-3.0  INR monitoring is per [facility] protocol.  Anticoagulation Medication: warfarin  Indication: Atrial Fibrillation/Atrial Flutter    Subjective   Bleeding signs/symptoms: No    Bruising: No   Major bleeding event: No  Thrombosis signs/symptoms: No  Thromboembolic event: No  Missed doses: No  Extra doses: No  Medication changes: No  Dietary changes: No  Change in health: No  Change in activity: No  Alcohol: No  Other concerns: No    Upcoming Procedures:  Does the Patient Have any upcoming procedures that require interruption in anticoagulation therapy? no  Does the patient require bridging? no      Anticoagulation Summary  As of 10/9/2024      INR goal:  2.0-3.0   TTR:  52.2% (10.4 mo)   INR used for dosing:  --   Weekly warfarin total:  20 mg               Assessment/Plan   Supratherapeutic     1. New dose:  dose decreased     2. Next INR: 2 weeks      Education provided to patient during the visit:  Patient instructed to call in interim with questions, concerns and changes.

## 2024-10-23 ENCOUNTER — APPOINTMENT (OUTPATIENT)
Dept: CARDIOLOGY | Facility: CLINIC | Age: 74
End: 2024-10-23
Payer: MEDICARE

## 2024-10-23 ENCOUNTER — ANTICOAGULATION - WARFARIN VISIT (OUTPATIENT)
Dept: CARDIOLOGY | Facility: HOSPITAL | Age: 74
End: 2024-10-23
Payer: MEDICARE

## 2024-10-23 DIAGNOSIS — I48.0 PAROXYSMAL ATRIAL FIBRILLATION (MULTI): ICD-10-CM

## 2024-10-23 DIAGNOSIS — R00.2 PALPITATIONS: ICD-10-CM

## 2024-10-23 LAB
POC INR: 3.1
POC PROTHROMBIN TIME: NORMAL

## 2024-10-23 PROCEDURE — 99211 OFF/OP EST MAY X REQ PHY/QHP: CPT | Performed by: INTERNAL MEDICINE

## 2024-10-23 PROCEDURE — 85610 PROTHROMBIN TIME: CPT | Mod: QW

## 2024-10-23 RX ORDER — DILTIAZEM HYDROCHLORIDE 180 MG/1
180 CAPSULE, COATED, EXTENDED RELEASE ORAL DAILY
Qty: 90 CAPSULE | Refills: 3 | Status: SHIPPED | OUTPATIENT
Start: 2024-10-23 | End: 2025-10-23

## 2024-10-23 RX ORDER — DIGOXIN 125 MCG
125 TABLET ORAL DAILY
Qty: 90 TABLET | Refills: 3 | Status: SHIPPED | OUTPATIENT
Start: 2024-10-23 | End: 2025-10-23

## 2024-10-23 NOTE — PROGRESS NOTES
Patient identification verified with 2 identifiers.    Location: [facility] - 1st Floor Anticoagulation Clinic [address] [id number]    Referring Physician: DR Abad  Enrollment/ Re-enrollment date: 2025   INR Goal: 2.0-3.0  INR monitoring is per [facility] protocol.  Anticoagulation Medication: warfarin  Indication: Atrial Fibrillation/Atrial Flutter    Subjective   Bleeding signs/symptoms: No    Bruising: No   Major bleeding event: No  Thrombosis signs/symptoms: No  Thromboembolic event: No  Missed doses: No  Extra doses: No  Medication changes: No  Dietary changes: No  Change in health: No  Change in activity: No  Alcohol: No  Other concerns: No    Upcoming Procedures:  Does the Patient Have any upcoming procedures that require interruption in anticoagulation therapy? no  Does the patient require bridging? no      Anticoagulation Summary  As of 10/23/2024      INR goal:  2.0-3.0   TTR:  47.5% (11.6 mo)   INR used for dosing:  3.10 (10/23/2024)   Weekly warfarin total:  18 mg               Assessment/Plan   Therapeutic     1. New dose: no change    2. Next INR: 1 month      Education provided to patient during the visit:  Patient instructed to call in interim with questions, concerns and changes.

## 2024-10-25 ENCOUNTER — OFFICE VISIT (OUTPATIENT)
Dept: CARDIOLOGY | Facility: CLINIC | Age: 74
End: 2024-10-25
Payer: MEDICARE

## 2024-10-25 VITALS — WEIGHT: 143 LBS | BODY MASS INDEX: 22.4 KG/M2 | SYSTOLIC BLOOD PRESSURE: 90 MMHG | DIASTOLIC BLOOD PRESSURE: 60 MMHG

## 2024-10-25 DIAGNOSIS — I50.32 CHRONIC DIASTOLIC CONGESTIVE HEART FAILURE: ICD-10-CM

## 2024-10-25 DIAGNOSIS — I10 BENIGN HYPERTENSION: ICD-10-CM

## 2024-10-25 DIAGNOSIS — I48.20 CHRONIC ATRIAL FIBRILLATION (MULTI): Primary | ICD-10-CM

## 2024-10-25 PROCEDURE — 1157F ADVNC CARE PLAN IN RCRD: CPT | Performed by: INTERNAL MEDICINE

## 2024-10-25 PROCEDURE — 99214 OFFICE O/P EST MOD 30 MIN: CPT | Performed by: INTERNAL MEDICINE

## 2024-10-25 PROCEDURE — 3078F DIAST BP <80 MM HG: CPT | Performed by: INTERNAL MEDICINE

## 2024-10-25 PROCEDURE — 1036F TOBACCO NON-USER: CPT | Performed by: INTERNAL MEDICINE

## 2024-10-25 PROCEDURE — 1159F MED LIST DOCD IN RCRD: CPT | Performed by: INTERNAL MEDICINE

## 2024-10-25 PROCEDURE — 3074F SYST BP LT 130 MM HG: CPT | Performed by: INTERNAL MEDICINE

## 2024-10-25 PROCEDURE — 1126F AMNT PAIN NOTED NONE PRSNT: CPT | Performed by: INTERNAL MEDICINE

## 2024-10-25 RX ORDER — LOSARTAN POTASSIUM 25 MG/1
25 TABLET ORAL DAILY
Qty: 30 TABLET | Refills: 11 | Status: SHIPPED | OUTPATIENT
Start: 2024-10-25 | End: 2025-10-25

## 2024-10-25 ASSESSMENT — ENCOUNTER SYMPTOMS
WHEEZING: 0
LOSS OF SENSATION IN FEET: 0
WEIGHT GAIN: 0
DEPRESSION: 0
OCCASIONAL FEELINGS OF UNSTEADINESS: 1
NEAR-SYNCOPE: 0
DIAPHORESIS: 0
SHORTNESS OF BREATH: 0
DYSPNEA ON EXERTION: 0
IRREGULAR HEARTBEAT: 0
WEIGHT LOSS: 0
ORTHOPNEA: 0
WEAKNESS: 0
COUGH: 0
DIZZINESS: 0
FEVER: 0
PND: 0
SYNCOPE: 0
MYALGIAS: 0
CLAUDICATION: 0
PALPITATIONS: 0

## 2024-10-25 ASSESSMENT — PAIN SCALES - GENERAL: PAINLEVEL_OUTOF10: 0-NO PAIN

## 2024-10-25 NOTE — ASSESSMENT & PLAN NOTE
Low BP today. Stopping hydrochlorothiazide, reducing Losartan to 25mg   Patient complains of the following symptoms:   Cough , sinus issues     How long have the symptoms occurred:   20 days     Have you or someone you have had contact with traveled outside the [place] in the past 14 days? no   If so what country? n/a    Was there an injury or accident: no   If so when   n/a    Pharmacy:   [facility] [phone]    Patient phone number verified and updated in EPIC: yes

## 2024-10-25 NOTE — ASSESSMENT & PLAN NOTE
Paroxysmal. Continue diltiazem, digoxin. On coumadin for oral AC. DOACs have been offered but are cost prohibitive

## 2024-11-03 ENCOUNTER — HOSPITAL ENCOUNTER (EMERGENCY)
Facility: HOSPITAL | Age: 74
Discharge: HOME | End: 2024-11-03
Attending: STUDENT IN AN ORGANIZED HEALTH CARE EDUCATION/TRAINING PROGRAM
Payer: MEDICARE

## 2024-11-03 ENCOUNTER — APPOINTMENT (OUTPATIENT)
Dept: RADIOLOGY | Facility: HOSPITAL | Age: 74
End: 2024-11-03
Payer: MEDICARE

## 2024-11-03 VITALS
HEIGHT: 67 IN | OXYGEN SATURATION: 99 % | BODY MASS INDEX: 23.7 KG/M2 | TEMPERATURE: 98.1 F | HEART RATE: 61 BPM | SYSTOLIC BLOOD PRESSURE: 121 MMHG | RESPIRATION RATE: 18 BRPM | DIASTOLIC BLOOD PRESSURE: 76 MMHG | WEIGHT: 151 LBS

## 2024-11-03 DIAGNOSIS — R51.9 NONINTRACTABLE HEADACHE, UNSPECIFIED CHRONICITY PATTERN, UNSPECIFIED HEADACHE TYPE: Primary | ICD-10-CM

## 2024-11-03 LAB
ALBUMIN SERPL BCP-MCNC: 4 G/DL (ref 3.4–5)
ALP SERPL-CCNC: 99 U/L (ref 33–136)
ALT SERPL W P-5'-P-CCNC: 16 U/L (ref 10–52)
ANION GAP SERPL CALCULATED.3IONS-SCNC: 12 MMOL/L (ref 10–20)
AST SERPL W P-5'-P-CCNC: 18 U/L (ref 9–39)
BASOPHILS # BLD AUTO: 0.07 X10*3/UL (ref 0–0.1)
BASOPHILS NFR BLD AUTO: 0.8 %
BILIRUB SERPL-MCNC: 0.6 MG/DL (ref 0–1.2)
BUN SERPL-MCNC: 20 MG/DL (ref 6–23)
CALCIUM SERPL-MCNC: 9.7 MG/DL (ref 8.6–10.3)
CHLORIDE SERPL-SCNC: 100 MMOL/L (ref 98–107)
CO2 SERPL-SCNC: 29 MMOL/L (ref 21–32)
CREAT SERPL-MCNC: 0.96 MG/DL (ref 0.5–1.3)
EGFRCR SERPLBLD CKD-EPI 2021: 85 ML/MIN/1.73M*2
EOSINOPHIL # BLD AUTO: 0.98 X10*3/UL (ref 0–0.4)
EOSINOPHIL NFR BLD AUTO: 11 %
ERYTHROCYTE [DISTWIDTH] IN BLOOD BY AUTOMATED COUNT: 13 % (ref 11.5–14.5)
GLUCOSE SERPL-MCNC: 85 MG/DL (ref 74–99)
HCT VFR BLD AUTO: 41.8 % (ref 41–52)
HGB BLD-MCNC: 14.2 G/DL (ref 13.5–17.5)
IMM GRANULOCYTES # BLD AUTO: 0.03 X10*3/UL (ref 0–0.5)
IMM GRANULOCYTES NFR BLD AUTO: 0.3 % (ref 0–0.9)
INR PPP: 3.3 (ref 0.9–1.2)
LYMPHOCYTES # BLD AUTO: 1.28 X10*3/UL (ref 0.8–3)
LYMPHOCYTES NFR BLD AUTO: 14.3 %
MCH RBC QN AUTO: 30 PG (ref 26–34)
MCHC RBC AUTO-ENTMCNC: 34 G/DL (ref 32–36)
MCV RBC AUTO: 88 FL (ref 80–100)
MONOCYTES # BLD AUTO: 0.7 X10*3/UL (ref 0.05–0.8)
MONOCYTES NFR BLD AUTO: 7.8 %
NEUTROPHILS # BLD AUTO: 5.87 X10*3/UL (ref 1.6–5.5)
NEUTROPHILS NFR BLD AUTO: 65.8 %
NRBC BLD-RTO: 0 /100 WBCS (ref 0–0)
PLATELET # BLD AUTO: 277 X10*3/UL (ref 150–450)
POTASSIUM SERPL-SCNC: 3.6 MMOL/L (ref 3.5–5.3)
PROT SERPL-MCNC: 6.5 G/DL (ref 6.4–8.2)
PROTHROMBIN TIME: 32 SECONDS (ref 9.3–12.7)
RBC # BLD AUTO: 4.73 X10*6/UL (ref 4.5–5.9)
SARS-COV-2 RNA RESP QL NAA+PROBE: NOT DETECTED
SODIUM SERPL-SCNC: 137 MMOL/L (ref 136–145)
WBC # BLD AUTO: 8.9 X10*3/UL (ref 4.4–11.3)

## 2024-11-03 PROCEDURE — 99284 EMERGENCY DEPT VISIT MOD MDM: CPT

## 2024-11-03 PROCEDURE — 85610 PROTHROMBIN TIME: CPT | Performed by: PHYSICIAN ASSISTANT

## 2024-11-03 PROCEDURE — 2500000001 HC RX 250 WO HCPCS SELF ADMINISTERED DRUGS (ALT 637 FOR MEDICARE OP): Performed by: PHYSICIAN ASSISTANT

## 2024-11-03 PROCEDURE — 70450 CT HEAD/BRAIN W/O DYE: CPT | Performed by: RADIOLOGY

## 2024-11-03 PROCEDURE — 70450 CT HEAD/BRAIN W/O DYE: CPT

## 2024-11-03 PROCEDURE — 80053 COMPREHEN METABOLIC PANEL: CPT | Performed by: PHYSICIAN ASSISTANT

## 2024-11-03 PROCEDURE — 85025 COMPLETE CBC W/AUTO DIFF WBC: CPT | Performed by: PHYSICIAN ASSISTANT

## 2024-11-03 PROCEDURE — 87635 SARS-COV-2 COVID-19 AMP PRB: CPT | Performed by: PHYSICIAN ASSISTANT

## 2024-11-03 PROCEDURE — 36415 COLL VENOUS BLD VENIPUNCTURE: CPT | Performed by: PHYSICIAN ASSISTANT

## 2024-11-03 RX ORDER — ACETAMINOPHEN 325 MG/1
975 TABLET ORAL ONCE
Status: COMPLETED | OUTPATIENT
Start: 2024-11-03 | End: 2024-11-03

## 2024-11-03 ASSESSMENT — PAIN SCALES - GENERAL
PAINLEVEL_OUTOF10: 4
PAINLEVEL_OUTOF10: 10 - WORST POSSIBLE PAIN

## 2024-11-03 ASSESSMENT — COLUMBIA-SUICIDE SEVERITY RATING SCALE - C-SSRS
1. IN THE PAST MONTH, HAVE YOU WISHED YOU WERE DEAD OR WISHED YOU COULD GO TO SLEEP AND NOT WAKE UP?: NO
2. HAVE YOU ACTUALLY HAD ANY THOUGHTS OF KILLING YOURSELF?: NO
6. HAVE YOU EVER DONE ANYTHING, STARTED TO DO ANYTHING, OR PREPARED TO DO ANYTHING TO END YOUR LIFE?: NO

## 2024-11-03 ASSESSMENT — PAIN DESCRIPTION - LOCATION: LOCATION: HEAD

## 2024-11-03 ASSESSMENT — LIFESTYLE VARIABLES
HAVE PEOPLE ANNOYED YOU BY CRITICIZING YOUR DRINKING: NO
TOTAL SCORE: 0
HAVE YOU EVER FELT YOU SHOULD CUT DOWN ON YOUR DRINKING: NO
EVER FELT BAD OR GUILTY ABOUT YOUR DRINKING: NO
EVER HAD A DRINK FIRST THING IN THE MORNING TO STEADY YOUR NERVES TO GET RID OF A HANGOVER: NO

## 2024-11-03 ASSESSMENT — PAIN - FUNCTIONAL ASSESSMENT: PAIN_FUNCTIONAL_ASSESSMENT: 0-10

## 2024-11-03 NOTE — ED PROVIDER NOTES
HPI   Chief Complaint   Patient presents with   • Headache     Right side of head pain, no change in vision, denies any n/v.        73-year-old male presented emergency department the chief complaint of 1 day of right-sided headache.  History of atrial fibrillation on Coumadin.  Denies fall or injury or trauma.  Denies vision changes.  Denies lightheadedness dizziness.  Denies recent illness.  Has some mild sinus congestion.  No other complaint.              Patient History   Past Medical History:   Diagnosis Date   • A-fib (Multi)    • COPD (chronic obstructive pulmonary disease) (Multi)    • Hyperlipidemia    • Hypertension    • Low back pain    • Nonexudative age-related macular degeneration, right eye, early dry stage    • Osteopenia    • Other secondary cataract, bilateral    • Personal history of other diseases of the circulatory system     History of angina   • Personal history of other diseases of the circulatory system     History of hypertension   • Personal history of other diseases of the respiratory system     History of chronic obstructive lung disease   • Pseudophakia    • Refractive error    • Spinal stenosis    • Unspecified asthma with (acute) exacerbation ([facility])     Asthma with acute exacerbation, unspecified asthma severity, unspecified whether persistent     Past Surgical History:   Procedure Laterality Date   • CATARACT EXTRACTION     • IR ANGIOGRAM PULMONARY Bilateral 04/10/2012    IR ANGIOGRAM PULMONARY [facility]   • OTHER SURGICAL HISTORY  07/23/2021    No history of surgery   • SINUS SURGERY       Family History   Problem Relation Name Age of Onset   • Stroke Mother Heather Solis    • Alzheimer's disease Mother Heather Solis    • Lung cancer Father Reggie TIWARI Chitalita    • Cancer Father Reggie TIWARI Chitalita    • Stroke Sister Dianne Harper    • Cancer Sister Dianne Harper      Social History     Tobacco Use   • Smoking status: Never     Passive exposure: Never   •  Smokeless tobacco: Never   Vaping Use   • Vaping status: Never Used   Substance Use Topics   • Alcohol use: Never   • Drug use: Never       Physical Exam   ED Triage Vitals [11/03/24 1326]   Temperature Heart Rate Respirations BP   36.7 °C (98.1 °F) 61 18 121/76      Pulse Ox Temp Source Heart Rate Source Patient Position   99 % Temporal Monitor Sitting      BP Location FiO2 (%)     Left arm --       Physical Exam  Vitals and nursing note reviewed.   Constitutional:       Appearance: He is well-developed.   HENT:      Head: Normocephalic.   Cardiovascular:      Rate and Rhythm: Normal rate and regular rhythm.   Pulmonary:      Effort: Pulmonary effort is normal.   Abdominal:      Palpations: Abdomen is soft.   Musculoskeletal:         General: Normal range of motion.   Skin:     General: Skin is warm.   Neurological:      Mental Status: He is alert and oriented to person, place, and time.      GCS: GCS eye subscore is 4. GCS verbal subscore is 5. GCS motor subscore is 6.      Cranial Nerves: No cranial nerve deficit or dysarthria.      Sensory: No sensory deficit.   Psychiatric:         Mood and Affect: Mood normal.           ED Course & MDM   Diagnoses as of 11/03/24 1604   Nonintractable headache, unspecified chronicity pattern, unspecified headache type                 No data recorded     Sheron Coma Scale Score: 15 (11/03/24 1341 : Teresa Lopez RN)                           Medical Decision Making  I have seen and evaluated this patient.  The attending physician has also seen and evaluated this patient.  Vital signs, laboratory testing and diagnostic images if applicable have been reviewed.  All laboratory and imaging is interpreted by myself unless otherwise stated.  Radiology studies are also formally interpreted by radiologist.    CBC without significant leukocytosis or anemia, metabolic panel without significant renal impairment or electrolyte abnormality.  INR 3.3, negative CT brain, presentation  inconsistent with subarachnoid hemorrhage or meningitis.  Patient with resolution of headache after acetaminophen in emergency department.  Discharged with outpatient follow-up.  Released in stable condition.          Labs Reviewed   CBC WITH AUTO DIFFERENTIAL - Abnormal       Result Value    WBC 8.9      nRBC 0.0      RBC 4.73      Hemoglobin 14.2      Hematocrit 41.8      MCV 88      MCH 30.0      MCHC 34.0      RDW 13.0      Platelets 277      Neutrophils % 65.8      Immature Granulocytes %, Automated 0.3      Lymphocytes % 14.3      Monocytes % 7.8      Eosinophils % 11.0      Basophils % 0.8      Neutrophils Absolute 5.87 (*)     Immature Granulocytes Absolute, Automated 0.03      Lymphocytes Absolute 1.28      Monocytes Absolute 0.70      Eosinophils Absolute 0.98 (*)     Basophils Absolute 0.07     PROTIME-INR - Abnormal    Protime 32.0 (*)     INR 3.3 (*)     Narrative:     INR Therapeutic Range: 2.0-3.5   COMPREHENSIVE METABOLIC PANEL - Normal    Glucose 85      Sodium 137      Potassium 3.6      Chloride 100      Bicarbonate 29      Anion Gap 12      Urea Nitrogen 20      Creatinine 0.96      eGFR 85      Calcium 9.7      Albumin 4.0      Alkaline Phosphatase 99      Total Protein 6.5      AST 18      Bilirubin, Total 0.6      ALT 16     SARS-COV-2 PCR - Normal    Coronavirus 2019, PCR Not Detected      Narrative:     This assay has received FDA Emergency Use Authorization (EUA) and is only authorized for the duration of time that circumstances exist to justify the authorization of the emergency use of in vitro diagnostic tests for the detection of SARS-CoV-2 virus and/or diagnosis of COVID-19 infection under section 564(b)(1) of the Act, 21 U.S.C. 360bbb-3(b)(1). This assay is an in vitro diagnostic nucleic acid amplification test for the qualitative detection of SARS-CoV-2 from nasopharyngeal specimens and has been validated for use at [facility]. Negative results do not preclude  COVID-19 infections and should not be used as the sole basis for diagnosis, treatment, or other management decisions.       CT head wo IV contrast   Final Result   No evidence of acute cortical infarct or intracranial hemorrhage.        Senescent changes.        Pansinusitis.        MACRO:   None        Signed by: Micah Davis 11/3/2024 3:48 PM   Dictation workstation:   [id number]        Medications   acetaminophen (Tylenol) tablet 975 mg (975 mg oral Given 11/3/24 1336)     New Prescriptions    No medications on file         Procedure  Procedures     Naveen Reid PA-C  11/03/24 5083

## 2024-11-03 NOTE — ED PROVIDER NOTES
Supervisory note:  Patient seen in conjunction with ARIANE Meadows.  Presents with headache.  It has been present for the last 3 to 4 days.  It is located in the posterior of the right portion of the head.  Nothing like this has happened before.  Patient reports that after he was given Tylenol in the emergency department, his headache has nearly resolved.  Patient is able to move all extremities and ambulate as usual.  Pupils equal, round, reactive to light bilaterally.  No nuchal rigidity.  There is no tenderness to palpation of temporal arteries bilaterally.    Head CT without acute findings.  My suspicion for intracranial mass, intracranial bleeding, giant cell arteritis, meningitis is very low.  Patient was advised to follow-up with primary care physician.  Return precautions given for any worsening symptoms.  I personally saw the patient and made/approved the management plan and take responsiblity for the patient management.  Parts of this chart were completed with dictation software, please excuse any errors in transcription.     Hayden Real MD  11/03/24 7117

## 2024-11-20 ENCOUNTER — ANTICOAGULATION - WARFARIN VISIT (OUTPATIENT)
Dept: CARDIOLOGY | Facility: HOSPITAL | Age: 74
End: 2024-11-20
Payer: MEDICARE

## 2024-11-20 DIAGNOSIS — I48.0 PAROXYSMAL ATRIAL FIBRILLATION (MULTI): ICD-10-CM

## 2024-11-20 LAB
POC INR: 2.3
POC PROTHROMBIN TIME: NORMAL

## 2024-11-20 PROCEDURE — 99211 OFF/OP EST MAY X REQ PHY/QHP: CPT | Performed by: INTERNAL MEDICINE

## 2024-11-20 PROCEDURE — 85610 PROTHROMBIN TIME: CPT | Mod: QW

## 2024-11-20 NOTE — PROGRESS NOTES
Patient identification verified with 2 identifiers.    Location: [facility] - 1st Floor Anticoagulation Clinic [address] [address]    Referring Physician: DR Abad  Enrollment/ Re-enrollment date: 2025   INR Goal: 2.0-3.0  INR monitoring is per [facility] protocol.  Anticoagulation Medication: warfarin  Indication: Atrial Fibrillation/Atrial Flutter    Subjective   Bleeding signs/symptoms: No    Bruising: No   Major bleeding event: No  Thrombosis signs/symptoms: No  Thromboembolic event: No  Missed doses: No  Extra doses: No  Medication changes: No  Dietary changes: No  Change in health: No  Change in activity: No  Alcohol: No  Other concerns: No    Upcoming Procedures:  Does the Patient Have any upcoming procedures that require interruption in anticoagulation therapy? no  Does the patient require bridging? no      Anticoagulation Summary  As of 11/20/2024      INR goal:  2.0-3.0   TTR:  46.0% (12 mo)   INR used for dosing:  --   Weekly warfarin total:  18 mg               Assessment/Plan   Therapeutic     1. New dose: no change    2. Next INR: 1 month      Education provided to patient during the visit:  Patient instructed to call in interim with questions, concerns and changes.

## 2024-11-30 PROCEDURE — RXMED WILLOW AMBULATORY MEDICATION CHARGE

## 2024-12-02 ENCOUNTER — PHARMACY VISIT (OUTPATIENT)
Dept: PHARMACY | Facility: CLINIC | Age: 74
End: 2024-12-02
Payer: COMMERCIAL

## 2024-12-18 ENCOUNTER — ANTICOAGULATION - WARFARIN VISIT (OUTPATIENT)
Dept: CARDIOLOGY | Facility: HOSPITAL | Age: 74
End: 2024-12-18
Payer: MEDICARE

## 2024-12-18 ENCOUNTER — OFFICE VISIT (OUTPATIENT)
Dept: URGENT CARE | Age: 74
End: 2024-12-18
Payer: MEDICARE

## 2024-12-18 VITALS
BODY MASS INDEX: 21.5 KG/M2 | TEMPERATURE: 97.3 F | HEIGHT: 67 IN | OXYGEN SATURATION: 96 % | SYSTOLIC BLOOD PRESSURE: 104 MMHG | DIASTOLIC BLOOD PRESSURE: 75 MMHG | HEART RATE: 67 BPM | WEIGHT: 137 LBS

## 2024-12-18 DIAGNOSIS — I48.0 PAROXYSMAL ATRIAL FIBRILLATION (MULTI): ICD-10-CM

## 2024-12-18 DIAGNOSIS — I50.32 CHRONIC DIASTOLIC CONGESTIVE HEART FAILURE: ICD-10-CM

## 2024-12-18 DIAGNOSIS — L30.9 GENERALIZED ECZEMA: Primary | ICD-10-CM

## 2024-12-18 LAB
POC INR: 2.7
POC PROTHROMBIN TIME: NORMAL

## 2024-12-18 PROCEDURE — 3074F SYST BP LT 130 MM HG: CPT | Performed by: NURSE PRACTITIONER

## 2024-12-18 PROCEDURE — 1125F AMNT PAIN NOTED PAIN PRSNT: CPT | Performed by: NURSE PRACTITIONER

## 2024-12-18 PROCEDURE — 99203 OFFICE O/P NEW LOW 30 MIN: CPT | Performed by: NURSE PRACTITIONER

## 2024-12-18 PROCEDURE — 85610 PROTHROMBIN TIME: CPT | Mod: QW

## 2024-12-18 PROCEDURE — 99211 OFF/OP EST MAY X REQ PHY/QHP: CPT | Performed by: INTERNAL MEDICINE

## 2024-12-18 PROCEDURE — 1157F ADVNC CARE PLAN IN RCRD: CPT | Performed by: NURSE PRACTITIONER

## 2024-12-18 PROCEDURE — 3008F BODY MASS INDEX DOCD: CPT | Performed by: NURSE PRACTITIONER

## 2024-12-18 PROCEDURE — 1159F MED LIST DOCD IN RCRD: CPT | Performed by: NURSE PRACTITIONER

## 2024-12-18 PROCEDURE — 3078F DIAST BP <80 MM HG: CPT | Performed by: NURSE PRACTITIONER

## 2024-12-18 RX ORDER — TRIAMCINOLONE ACETONIDE 1 MG/G
CREAM TOPICAL 2 TIMES DAILY
Qty: 80 G | Refills: 0 | Status: SHIPPED | OUTPATIENT
Start: 2024-12-18 | End: 2025-01-01

## 2024-12-18 ASSESSMENT — PAIN SCALES - GENERAL: PAINLEVEL_OUTOF10: 2

## 2024-12-18 NOTE — PROGRESS NOTES
Patient identification verified with 2 identifiers.    Location: [facility] - 1st Floor Anticoagulation Clinic [address] [phone]    Referring Physician: DR Abad  Enrollment/ Re-enrollment date:    INR Goal: 2.0-3.0  INR monitoring is per [facility] protocol.  Anticoagulation Medication: warfarin  Indication: Atrial Fibrillation/Atrial Flutter    Subjective   Bleeding signs/symptoms: No    Bruising: No   Major bleeding event: No  Thrombosis signs/symptoms: No  Thromboembolic event: No  Missed doses: No  Extra doses: No  Medication changes: No  Dietary changes: No  Change in health: No  Change in activity: No  Alcohol: No  Other concerns: No    Upcoming Procedures:  Does the Patient Have any upcoming procedures that require interruption in anticoagulation therapy? no  Does the patient require bridging? no      Anticoagulation Summary  As of 2024      INR goal:  2.0-3.0   TTR:  50.7% (1.1 y)   INR used for dosin.70 (2024)   Weekly warfarin total:  18 mg               Assessment/Plan   Therapeutic     1. New dose: no change    2. Next INR: 1 month      Education provided to patient during the visit:  Patient instructed to call in interim with questions, concerns and changes.

## 2024-12-18 NOTE — PATIENT INSTRUCTIONS
Please wash area with mild soap and water.  Dry.  Apply triamcinolone cream.  Wait 1 to 2 hours then apply Cetaphil cream.  Please do this routine in the morning.  Please do this routine in the evening after dinner.  Please follow-up with dermatology.  Please call  referral line to schedule an appointment.  Please call for a follow up appointment with:   [facility]  [address]   [place] [address]  Phone: [phone]  Schedule appointment online: [employer]     Please follow up with your primary provider within one week if symptoms do not improve.  You may schedule an appointment online at [email or web address] or call [phone]. Go to the Emergency Department if symptoms significantly worsen or if you develop chest pain or shortness of breath.

## 2024-12-18 NOTE — PROGRESS NOTES
Subjective   Patient ID: Reggie Munoz is a 74 y.o. male. They present today with a chief complaint of Rash (Patient c/o rash on legs arms and back x 1 week, itchy. ).    History of Present Illness  Reggie Munoz is a 74 y.o. male who presents to the clinic for rash/dry skin on bilateral legs and bilateral arms.  Patient states this has been there for quite some time.  Patient states there is intermittent itching to the areas of irritation. Pt denies any chest pain, sob, N/V at this time in clinic.             Past Medical History  Allergies as of 12/18/2024    (No Known Allergies)       (Not in a hospital admission)       Past Medical History:   Diagnosis Date    A-fib (Multi)     COPD (chronic obstructive pulmonary disease) (Multi)     Hyperlipidemia     Hypertension     Low back pain     Nonexudative age-related macular degeneration, right eye, early dry stage     Osteopenia     Other secondary cataract, bilateral     Personal history of other diseases of the circulatory system     History of angina    Personal history of other diseases of the circulatory system     History of hypertension    Personal history of other diseases of the respiratory system     History of chronic obstructive lung disease    Pseudophakia     Refractive error     Spinal stenosis     Unspecified asthma with (acute) exacerbation ([facility])     Asthma with acute exacerbation, unspecified asthma severity, unspecified whether persistent       Past Surgical History:   Procedure Laterality Date    CATARACT EXTRACTION      IR ANGIOGRAM PULMONARY Bilateral 04/10/2012    IR ANGIOGRAM PULMONARY [facility]    OTHER SURGICAL HISTORY  07/23/2021    No history of surgery    SINUS SURGERY          reports that he has never smoked. He has never been exposed to tobacco smoke. He has never used smokeless tobacco. He reports that he does not drink alcohol and does not use drugs.    Review of Systems  Review of Systems   Skin:  Positive  "for rash.        Bilateral lower legs, wrists, and shoulders.                                  Objective    Vitals:    12/18/24 1428   BP: 104/75   Pulse: 67   Temp: 36.3 °C (97.3 °F)   SpO2: 96%   Weight: 62.1 kg (137 lb)   Height: 1.702 m (5' 7\")     No LMP for male patient.    Physical Exam  Constitutional:       Appearance: Normal appearance.   Skin:     Findings: Rash present. Rash is crusting and scaling.          Neurological:      General: No focal deficit present.      Mental Status: He is alert and oriented to person, place, and time. Mental status is at baseline.   Psychiatric:         Mood and Affect: Mood normal.         Behavior: Behavior normal.         Procedures    Point of Care Test & Imaging Results from this visit  No results found for this visit on 12/18/24.   No results found.    Diagnostic study results (if any) were reviewed by RYLAND Velazquez.    Assessment/Plan   Allergies, medications, history, and pertinent labs/EKGs/Imaging reviewed by RYLAND Velazquez.     Medical Decision Making  History and examination consistent with skin rash/irritation/eczema. . No indication for further testing at this time. No evidence of signs of infection, abscess, or air way obstruction. Counseled Patient/family on treatment plan which includes steroids . Return to clinic or present to ED if symptoms change or worsen. Otherwise follow with PCP for further evaluation.  -Advised patient to wash the area with mild soap water.  Dry.  Apply triamcinolone.  Wait 1 to 2 hours apply Cetaphil lotion/cream.  Perform this 2 times a day.  Follow-up with dermatology.  Patient verbalized understanding.    Orders and Diagnoses  Diagnoses and all orders for this visit:  Generalized eczema  -     triamcinolone (Kenalog) 0.1 % cream; Apply topically 2 times a day for 14 days.  -     Referral to Dermatology      Medical Admin Record      Patient disposition: Home    Electronically signed by Den Soto, " APRN-CNP  3:35 PM

## 2024-12-19 RX ORDER — FUROSEMIDE 40 MG/1
40 TABLET ORAL DAILY
Qty: 90 TABLET | Refills: 3 | Status: SHIPPED | OUTPATIENT
Start: 2024-12-19 | End: 2025-12-19

## 2025-01-13 ENCOUNTER — APPOINTMENT (OUTPATIENT)
Dept: PRIMARY CARE | Facility: CLINIC | Age: 75
End: 2025-01-13
Payer: MEDICARE

## 2025-01-13 VITALS
RESPIRATION RATE: 16 BRPM | BODY MASS INDEX: 21.44 KG/M2 | HEART RATE: 56 BPM | SYSTOLIC BLOOD PRESSURE: 122 MMHG | WEIGHT: 136.6 LBS | DIASTOLIC BLOOD PRESSURE: 67 MMHG | OXYGEN SATURATION: 96 % | HEIGHT: 67 IN

## 2025-01-13 DIAGNOSIS — E78.5 HYPERLIPIDEMIA, UNSPECIFIED HYPERLIPIDEMIA TYPE: ICD-10-CM

## 2025-01-13 DIAGNOSIS — L30.8 ECZEMA CRAQUELE: Primary | ICD-10-CM

## 2025-01-13 DIAGNOSIS — I50.32 CHRONIC DIASTOLIC CONGESTIVE HEART FAILURE: ICD-10-CM

## 2025-01-13 DIAGNOSIS — I48.91 ATRIAL FIBRILLATION, UNSPECIFIED TYPE (MULTI): ICD-10-CM

## 2025-01-13 DIAGNOSIS — R00.2 PALPITATIONS: ICD-10-CM

## 2025-01-13 DIAGNOSIS — H35.3230 BILATERAL EXUDATIVE AGE-RELATED MACULAR DEGENERATION, UNSPECIFIED STAGE: ICD-10-CM

## 2025-01-13 DIAGNOSIS — J44.1 COPD EXACERBATION (MULTI): ICD-10-CM

## 2025-01-13 PROBLEM — J44.9 CHRONIC OBSTRUCTIVE PULMONARY DISEASE (MULTI): Status: RESOLVED | Noted: 2023-09-09 | Resolved: 2025-01-13

## 2025-01-13 PROBLEM — J96.11 CHRONIC RESPIRATORY FAILURE WITH HYPOXIA (MULTI): Status: RESOLVED | Noted: 2024-03-23 | Resolved: 2025-01-13

## 2025-01-13 PROBLEM — J44.9 SEVERE CHRONIC OBSTRUCTIVE PULMONARY DISEASE (MULTI): Status: RESOLVED | Noted: 2024-04-12 | Resolved: 2025-01-13

## 2025-01-13 PROCEDURE — 3074F SYST BP LT 130 MM HG: CPT | Performed by: INTERNAL MEDICINE

## 2025-01-13 PROCEDURE — 1159F MED LIST DOCD IN RCRD: CPT | Performed by: INTERNAL MEDICINE

## 2025-01-13 PROCEDURE — G2211 COMPLEX E/M VISIT ADD ON: HCPCS | Performed by: INTERNAL MEDICINE

## 2025-01-13 PROCEDURE — 3008F BODY MASS INDEX DOCD: CPT | Performed by: INTERNAL MEDICINE

## 2025-01-13 PROCEDURE — 1170F FXNL STATUS ASSESSED: CPT | Performed by: INTERNAL MEDICINE

## 2025-01-13 PROCEDURE — 1126F AMNT PAIN NOTED NONE PRSNT: CPT | Performed by: INTERNAL MEDICINE

## 2025-01-13 PROCEDURE — 1157F ADVNC CARE PLAN IN RCRD: CPT | Performed by: INTERNAL MEDICINE

## 2025-01-13 PROCEDURE — 99213 OFFICE O/P EST LOW 20 MIN: CPT | Performed by: INTERNAL MEDICINE

## 2025-01-13 PROCEDURE — 3078F DIAST BP <80 MM HG: CPT | Performed by: INTERNAL MEDICINE

## 2025-01-13 RX ORDER — FUROSEMIDE 40 MG/1
40 TABLET ORAL DAILY
Qty: 90 TABLET | Refills: 3 | Status: SHIPPED | OUTPATIENT
Start: 2025-01-13 | End: 2026-01-13

## 2025-01-13 RX ORDER — DILTIAZEM HYDROCHLORIDE 180 MG/1
180 CAPSULE, COATED, EXTENDED RELEASE ORAL DAILY
Qty: 90 CAPSULE | Refills: 3 | Status: SHIPPED | OUTPATIENT
Start: 2025-01-13 | End: 2026-01-13

## 2025-01-13 RX ORDER — WARFARIN 2 MG/1
2 TABLET ORAL NIGHTLY
Qty: 100 TABLET | Refills: 3 | Status: SHIPPED | OUTPATIENT
Start: 2025-01-13 | End: 2025-01-17 | Stop reason: SDUPTHER

## 2025-01-13 RX ORDER — DIGOXIN 125 MCG
125 TABLET ORAL DAILY
Qty: 90 TABLET | Refills: 3 | Status: SHIPPED | OUTPATIENT
Start: 2025-01-13 | End: 2026-01-13

## 2025-01-13 RX ORDER — ATORVASTATIN CALCIUM 10 MG/1
10 TABLET, FILM COATED ORAL NIGHTLY
Qty: 90 TABLET | Refills: 3 | Status: SHIPPED | OUTPATIENT
Start: 2025-01-13

## 2025-01-13 ASSESSMENT — ACTIVITIES OF DAILY LIVING (ADL)
GROOMING: INDEPENDENT
WALKS IN HOME: INDEPENDENT
JUDGMENT_ADEQUATE_SAFELY_COMPLETE_DAILY_ACTIVITIES: YES
USING TRANSPORTATION: INDEPENDENT
DOING HOUSEWORK: INDEPENDENT
MANAGING FINANCES: INDEPENDENT
PILL BOX USED: NO
GROCERY SHOPPING: INDEPENDENT
PREPARING MEALS: INDEPENDENT
PATIENT'S MEMORY ADEQUATE TO SAFELY COMPLETE DAILY ACTIVITIES?: YES
HEARING - LEFT EAR: FUNCTIONAL
EATING: INDEPENDENT
USING TELEPHONE: INDEPENDENT
ADEQUATE_TO_COMPLETE_ADL: YES
FEEDING YOURSELF: INDEPENDENT
NEEDS ASSISTANCE WITH FOOD: INDEPENDENT
HEARING - RIGHT EAR: FUNCTIONAL
BATHING: INDEPENDENT
TOILETING: INDEPENDENT
TAKING MEDICATION: INDEPENDENT
DRESSING YOURSELF: INDEPENDENT
STIL DRIVING: YES

## 2025-01-13 ASSESSMENT — PAIN SCALES - GENERAL: PAINLEVEL_OUTOF10: 0-NO PAIN

## 2025-01-13 ASSESSMENT — ENCOUNTER SYMPTOMS
LOSS OF SENSATION IN FEET: 0
OCCASIONAL FEELINGS OF UNSTEADINESS: 0
DEPRESSION: 0

## 2025-01-13 NOTE — PROGRESS NOTES
"Subjective   Patient ID: Reggie Munoz is a 74 y.o. male who presents for Annual Exam.    He is c/o itching of back, legs arms, noticed a rash, was seen in UC and Rx Triamcinolone and moisturizer, but he only applied sporadically.  He is complaining of shortness of breath with exertion, which is his usual.  He lives alone, he has [employer] friends who help him with transportation and medication management         Review of Systems   Respiratory:  Positive for shortness of breath. Negative for cough.    Cardiovascular:  Negative for chest pain and palpitations.   Gastrointestinal:  Negative for abdominal pain, constipation, diarrhea and nausea.   Musculoskeletal:  Negative for arthralgias.   Skin:  Positive for rash.   Neurological:  Negative for dizziness.       Objective   /67 (BP Location: Left arm, Patient Position: Sitting, BP Cuff Size: Adult)   Pulse 56   Resp 16   Ht 1.702 m (5' 7\")   Wt 62 kg (136 lb 9.6 oz)   SpO2 96%   BMI 21.39 kg/m²     Physical Exam  Cardiovascular:      Rate and Rhythm: Normal rate and regular rhythm.      Heart sounds: Normal heart sounds.   Pulmonary:      Breath sounds: Normal breath sounds.   Abdominal:      Palpations: Abdomen is soft. There is no hepatomegaly.      Tenderness: There is no abdominal tenderness.   Musculoskeletal:      Right lower leg: No edema.      Left lower leg: No edema.   Skin:     Findings: Rash (On lower extremities, upper back, dry skin) present.   Neurological:      Mental Status: He is alert and oriented to person, place, and time.      Gait: Gait normal.   Psychiatric:         Mood and Affect: Mood normal.         Behavior: Behavior normal.         Assessment/Plan   Problem List Items Addressed This Visit             ICD-10-CM    Congestive heart failure I50.9    Relevant Medications    dilTIAZem CD (Cardizem CD) 180 mg 24 hr capsule    digoxin (Lanoxin) 125 MCG tablet    furosemide (Lasix) 40 mg tablet    Exudative age-related macular " degeneration (Multi) H35.3290    Hyperlipidemia E78.5    Relevant Medications    atorvastatin (Lipitor) 10 mg tablet    Palpitations R00.2    Relevant Medications    dilTIAZem CD (Cardizem CD) 180 mg 24 hr capsule    digoxin (Lanoxin) 125 MCG tablet    RESOLVED: COPD exacerbation (Multi) J44.1     Other Visit Diagnoses         Codes    Eczema craquele    -  Primary L30.8    Atrial fibrillation, unspecified type (Multi)     I48.91    Relevant Medications    dilTIAZem CD (Cardizem CD) 180 mg 24 hr capsule    digoxin (Lanoxin) 125 MCG tablet        Continue triamcinolone cream, apply moisturizer twice daily

## 2025-01-15 ENCOUNTER — ANTICOAGULATION - WARFARIN VISIT (OUTPATIENT)
Dept: CARDIOLOGY | Facility: HOSPITAL | Age: 75
End: 2025-01-15
Payer: MEDICARE

## 2025-01-15 DIAGNOSIS — I48.0 PAROXYSMAL ATRIAL FIBRILLATION (MULTI): ICD-10-CM

## 2025-01-15 LAB
POC INR: 2
POC PROTHROMBIN TIME: NORMAL

## 2025-01-15 PROCEDURE — 85610 PROTHROMBIN TIME: CPT | Mod: QW

## 2025-01-15 PROCEDURE — 99211 OFF/OP EST MAY X REQ PHY/QHP: CPT | Performed by: INTERNAL MEDICINE

## 2025-01-15 NOTE — PROGRESS NOTES
Patient identification verified with 2 identifiers.    Location: [facility] - 1st Floor Anticoagulation Clinic [address] [id number]    Referring Physician: DR Abad  Enrollment/ Re-enrollment date:    INR Goal: 2.0-3.0  INR monitoring is per [facility] protocol.  Anticoagulation Medication: warfarin  Indication: Atrial Fibrillation/Atrial Flutter    Subjective   Bleeding signs/symptoms: No    Bruising: No   Major bleeding event: No  Thrombosis signs/symptoms: No  Thromboembolic event: No  Missed doses: No  Extra doses: No  Medication changes: No  Dietary changes: No  Change in health: No  Change in activity: No  Alcohol: No  Other concerns: No    Upcoming Procedures:  Does the Patient Have any upcoming procedures that require interruption in anticoagulation therapy? no  Does the patient require bridging? no      Anticoagulation Summary  As of 1/15/2025      INR goal:  2.0-3.0   TTR:  53.9% (1.2 y)   INR used for dosin.00 (1/15/2025)   Weekly warfarin total:  18 mg               Assessment/Plan   Therapeutic     1. New dose: no change    2. Next INR: 1 month      Education provided to patient during the visit:  Patient instructed to call in interim with questions, concerns and changes.

## 2025-01-17 DIAGNOSIS — R00.2 PALPITATIONS: ICD-10-CM

## 2025-01-17 RX ORDER — WARFARIN 2 MG/1
2 TABLET ORAL NIGHTLY
Qty: 100 TABLET | Refills: 3 | Status: SHIPPED | OUTPATIENT
Start: 2025-01-17

## 2025-01-20 DIAGNOSIS — R00.2 PALPITATIONS: ICD-10-CM

## 2025-01-20 RX ORDER — WARFARIN 2 MG/1
2 TABLET ORAL SEE ADMIN INSTRUCTIONS
Qty: 100 TABLET | Refills: 3 | Status: SHIPPED | OUTPATIENT
Start: 2025-01-20

## 2025-01-20 NOTE — TELEPHONE ENCOUNTER
THE DIRECTIONS ON THE ORIGINAL PRESCRIPTION WERE NOT CLEAR, THEY COULD NOT TAKE VERBALLY DUE TO MEDICARE GUIDELINES.

## 2025-01-26 ASSESSMENT — ENCOUNTER SYMPTOMS
DIZZINESS: 0
ABDOMINAL PAIN: 0
PALPITATIONS: 0
DIARRHEA: 0
ARTHRALGIAS: 0
SHORTNESS OF BREATH: 1
COUGH: 0
NAUSEA: 0
CONSTIPATION: 0

## 2025-02-12 ENCOUNTER — ANTICOAGULATION - WARFARIN VISIT (OUTPATIENT)
Dept: CARDIOLOGY | Facility: HOSPITAL | Age: 75
End: 2025-02-12
Payer: MEDICARE

## 2025-02-12 DIAGNOSIS — I48.0 PAROXYSMAL ATRIAL FIBRILLATION (MULTI): ICD-10-CM

## 2025-02-12 LAB
POC INR: 2.7
POC PROTHROMBIN TIME: NORMAL

## 2025-02-12 PROCEDURE — 85610 PROTHROMBIN TIME: CPT | Mod: QW

## 2025-02-12 PROCEDURE — 99211 OFF/OP EST MAY X REQ PHY/QHP: CPT | Performed by: INTERNAL MEDICINE

## 2025-02-12 NOTE — PROGRESS NOTES
Patient identification verified with 2 identifiers.    Location: [facility] - 1st Floor Anticoagulation Clinic [address] [id number]    Referring Physician: DR Abad  Enrollment/ Re-enrollment date:    INR Goal: 2.0-3.0  INR monitoring is per [facility] protocol.  Anticoagulation Medication: warfarin  Indication: Atrial Fibrillation/Atrial Flutter    Subjective   Bleeding signs/symptoms: No    Bruising: No   Major bleeding event: No  Thrombosis signs/symptoms: No  Thromboembolic event: No  Missed doses: No  Extra doses: No  Medication changes: No  Dietary changes: No  Change in health: No  Change in activity: No  Alcohol: No  Other concerns: No    Upcoming Procedures:  Does the Patient Have any upcoming procedures that require interruption in anticoagulation therapy? no  Does the patient require bridging? no      Anticoagulation Summary  As of 2025      INR goal:  2.0-3.0   TTR:  56.7% (1.3 y)   INR used for dosin.70 (2025)   Weekly warfarin total:  18 mg               Assessment/Plan   Therapeutic     1. New dose:  no change     2. Next INR: 1 month      Education provided to patient during the visit:  Patient instructed to call in interim with questions, concerns and changes.

## 2025-02-14 NOTE — PROGRESS NOTES
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  He also has a history of paroxysmal atrial fibrillation and has been on oral anticoagulation in the form of Coumadin managed by the coumadin clinic.  He has  severe COPD.          Review of Systems   Constitutional: Negative for diaphoresis, fever, weight gain and weight loss.   Eyes:  Negative for visual disturbance.   Cardiovascular:  Negative for chest pain, claudication, dyspnea on exertion, irregular heartbeat, leg swelling, near-syncope, orthopnea, palpitations, paroxysmal nocturnal dyspnea and syncope.   Respiratory:  Negative for cough, shortness of breath and wheezing.    Musculoskeletal:  Negative for muscle weakness and myalgias.   Neurological:  Negative for dizziness and weakness.   All other systems reviewed and are negative.       Past Medical History:   Diagnosis Date    A-fib (Multi)     COPD (chronic obstructive pulmonary disease) (Multi)     Hyperlipidemia     Hypertension     Low back pain     Nonexudative age-related macular degeneration, right eye, early dry stage     Osteopenia     Other secondary cataract, bilateral     Personal history of other diseases of the circulatory system     History of angina    Personal history of other diseases of the circulatory system     History of hypertension    Personal history of other diseases of the respiratory system     History of chronic obstructive lung disease    Pseudophakia     Refractive error     Spinal stenosis     Unspecified asthma with (acute) exacerbation ([facility])     Asthma with acute exacerbation, unspecified asthma severity, unspecified whether persistent        Past Surgical History:   Procedure Laterality Date    CATARACT EXTRACTION      IR ANGIOGRAM PULMONARY Bilateral 04/10/2012    IR ANGIOGRAM PULMONARY [facility]    OTHER SURGICAL HISTORY  07/23/2021     No history of surgery    SINUS SURGERY          Social History     Socioeconomic History    Marital status: Single     Spouse name: Not on file    Number of children: Not on file    Years of education: Not on file    Highest education level: Not on file   Occupational History    Not on file   Tobacco Use    Smoking status: Never     Passive exposure: Never    Smokeless tobacco: Never   Vaping Use    Vaping status: Never Used   Substance and Sexual Activity    Alcohol use: Never    Drug use: Never    Sexual activity: Defer   Other Topics Concern    Not on file   Social History Narrative    Not on file     Social Drivers of Health     Financial Resource Strain: Low Risk  (3/18/2024)    Overall Financial Resource Strain (CARDIA)     Difficulty of Paying Living Expenses: Not very hard   Food Insecurity: No Food Insecurity (3/18/2024)    Hunger Vital Sign     Worried About Running Out of Food in the Last Year: Never true     Ran Out of Food in the Last Year: Never true   Transportation Needs: No Transportation Needs (3/18/2024)    PRAPARE - Transportation     Lack of Transportation (Medical): No     Lack of Transportation (Non-Medical): No   Physical Activity: Inactive (3/18/2024)    Exercise Vital Sign     Days of Exercise per Week: 0 days     Minutes of Exercise per Session: 0 min   Stress: No Stress Concern Present (3/18/2024)    [employer] - Occupational Stress Questionnaire     Feeling of Stress : Not at all   Social Connections: Moderately Isolated (3/18/2024)    Social Connection and Isolation Panel [NHANES]     Frequency of Communication with Friends and Family: More than three times a week     Frequency of Social Gatherings with Friends and Family: Three times a week     Attends [employer] Services: More than 4 times per year     Active Member of Clubs or Organizations: No     Attends Club or Organization Meetings: Never     Marital Status: Never    Intimate Partner Violence:  Not At Risk (3/18/2024)    Humiliation, Afraid, Rape, and Kick questionnaire     Fear of Current or Ex-Partner: No     Emotionally Abused: No     Physically Abused: No     Sexually Abused: No   Housing Stability: Low Risk  (3/18/2024)    Housing Stability Vital Sign     Unable to Pay for Housing in the Last Year: No     Number of Places Lived in the Last Year: 1     Unstable Housing in the Last Year: No        Family History   Problem Relation Name Age of Onset    Stroke Mother Heather Solis     Alzheimer's disease Mother Heather Solis     Lung cancer Father Reggie Munoz     Cancer Father Reggie Munoz     Stroke Sister Dianne Harper     Cancer Sister Dianne Harper         OBJECTIVE:    Vitals:    10/25/24 1259   BP: 90/60        Vitals reviewed.   Constitutional:       Appearance: Normal and healthy appearance. Not in distress.   Pulmonary:      Effort: Pulmonary effort is normal.      Breath sounds: Normal breath sounds.   Cardiovascular:      Normal rate. Regular rhythm. Normal S1. Normal S2.       Murmurs: There is a grade 2/6 holosystolic murmur at the LLSB.      No gallop.  No click.   Pulses:     Intact distal pulses.   Edema:     Peripheral edema absent.   Skin:     General: Skin is warm and dry.   Neurological:      General: No focal deficit present.          Lab Review:   Lab Results   Component Value Date    CHOL 181 04/15/2024    TRIG 148 04/15/2024    HDL 62.0 04/15/2024       Lab Results   Component Value Date    LDLCALC 89 04/15/2024        Chronic atrial fibrillation (Multi)  Paroxysmal. Continue diltiazem, digoxin. On coumadin for oral AC. DOACs have been offered but are cost prohibitive    Congestive heart failure (Multi)  Stage 1 diastolic dysfunction. Euvolemic. Continue lasix. Checking BMP        Name band;

## 2025-03-07 ENCOUNTER — OFFICE VISIT (OUTPATIENT)
Dept: OPHTHALMOLOGY | Facility: CLINIC | Age: 75
End: 2025-03-07
Payer: MEDICARE

## 2025-03-07 DIAGNOSIS — Z96.1 ARTIFICIAL LENS PRESENT: Primary | ICD-10-CM

## 2025-03-07 DIAGNOSIS — H35.3111 EARLY DRY STAGE NONEXUDATIVE AGE-RELATED MACULAR DEGENERATION OF RIGHT EYE: ICD-10-CM

## 2025-03-07 DIAGNOSIS — H52.7 REFRACTIVE ERROR: ICD-10-CM

## 2025-03-07 DIAGNOSIS — H35.3222 EXUDATIVE AGE-RELATED MACULAR DEGENERATION OF LEFT EYE WITH INACTIVE CHOROIDAL NEOVASCULARIZATION: ICD-10-CM

## 2025-03-07 DIAGNOSIS — H50.52 EXOPHORIA: ICD-10-CM

## 2025-03-07 PROCEDURE — 99214 OFFICE O/P EST MOD 30 MIN: CPT | Performed by: OPHTHALMOLOGY

## 2025-03-07 ASSESSMENT — REFRACTION_MANIFEST
OS_CYLINDER: -1.25
OD_CYLINDER: -1.75
OD_SPHERE: -0.25
OD_AXIS: 100
OD_ADD: +3.00
OS_CYLINDER: -2.00
OS_AXIS: 090
OD_CYLINDER: -0.75
OD_SPHERE: -0.25
OS_ADD: +3.00
OS_AXIS: 090
METHOD_AUTOREFRACTION: 1
OD_AXIS: 100
OS_SPHERE: +0.75
OS_SPHERE: +0.75

## 2025-03-07 ASSESSMENT — SLIT LAMP EXAM - LIDS
COMMENTS: NORMAL
COMMENTS: NORMAL

## 2025-03-07 ASSESSMENT — KERATOMETRY
OS_AXISANGLE2_DEGREES: 95
OS_AXISANGLE_DEGREES: 5
OS_K1POWER_DIOPTERS: 45.00
OD_K1POWER_DIOPTERS: 43.75
OD_AXISANGLE_DEGREES: 10
OD_K2POWER_DIOPTERS: 45.75
OD_AXISANGLE2_DEGREES: 100
OS_K2POWER_DIOPTERS: 46.25
METHOD_AUTO_MANUAL: AUTOMATED

## 2025-03-07 ASSESSMENT — REFRACTION_WEARINGRX
OD_SPHERE: +3.25
OS_SPHERE: +3.25
SPECS_TYPE: READERS

## 2025-03-07 ASSESSMENT — EXTERNAL EXAM - RIGHT EYE: OD_EXAM: NORMAL

## 2025-03-07 ASSESSMENT — ENCOUNTER SYMPTOMS
MUSCULOSKELETAL NEGATIVE: 0
EYES NEGATIVE: 0
ENDOCRINE NEGATIVE: 0
HEMATOLOGIC/LYMPHATIC NEGATIVE: 0
ALLERGIC/IMMUNOLOGIC NEGATIVE: 0
RESPIRATORY NEGATIVE: 0
CARDIOVASCULAR NEGATIVE: 0
PSYCHIATRIC NEGATIVE: 0
CONSTITUTIONAL NEGATIVE: 0
NEUROLOGICAL NEGATIVE: 0
GASTROINTESTINAL NEGATIVE: 0

## 2025-03-07 ASSESSMENT — PATIENT HEALTH QUESTIONNAIRE - PHQ9
SUM OF ALL RESPONSES TO PHQ9 QUESTIONS 1 AND 2: 0
2. FEELING DOWN, DEPRESSED OR HOPELESS: NOT AT ALL
1. LITTLE INTEREST OR PLEASURE IN DOING THINGS: NOT AT ALL

## 2025-03-07 ASSESSMENT — CUP TO DISC RATIO
OD_RATIO: 0.35
OS_RATIO: 0.35

## 2025-03-07 ASSESSMENT — TONOMETRY
OS_IOP_MMHG: 14
IOP_METHOD: GOLDMANN APPLANATION
OD_IOP_MMHG: 14

## 2025-03-07 ASSESSMENT — VISUAL ACUITY
OD_CC: 20/40
OS_CC: 20/40
OS_CC+: -1
OD_CC+: +1
METHOD: SNELLEN - SINGLE

## 2025-03-07 ASSESSMENT — PAIN SCALES - GENERAL: PAINLEVEL_OUTOF10: 0-NO PAIN

## 2025-03-07 ASSESSMENT — EXTERNAL EXAM - LEFT EYE: OS_EXAM: NORMAL

## 2025-03-07 NOTE — PATIENT INSTRUCTIONS
Thank you so much for choosing me to provide your care today!    If you were dilated your vision may remain blurry   or light sensitive for several hours.    The nature of eye and vision problems can require frequent follow up, please make every effort to adhere to any future appointments.    If you have any issues, questions, or concerns,   please do not hesitate to reach out.    If you receive a survey in regards to your care today, please mention any exceptional care my office staff and/or technicians provided.    You can reach our office at this number:    [phone]    Please consider signing up for and utilizing [employer]!  This is the best way to directly reach me or other  providers

## 2025-03-07 NOTE — PROGRESS NOTES
Assessment/Plan   Problem List Items Addressed This Visit       Artificial lens present - Primary     Stable intraocular lens (IOL) both eyes (OU), will monitor with serial exam.          Exudative age-related macular degeneration (Multi)     Stable retinal exam both eyes (OU) without no progression or changes. Advised to continue best lifestyle modifications and given new amsler for monitoring at home. Should call if any new vision issues noted.          Nonexudative age-related macular degeneration     Stable mild appearing nonexudative age-related macular degeneration (AMD) right eye (OD). As per left eye (OS) exudative assessment.         Exophoria     Improving visual discomfort, advised would be OK to hold on neuro-ophth visit.          Refractive error     Refraction performed today for diagnostic purposes only and without specific intent to dispense Rx. Glasses/SCL Rx not dispensed today.               Provided reassurance regarding above diagnoses and care received in the office visit today. Discussed outcomes and options along with the importance of treatment compliance. Understands the importance of any follow up visits. Patient instructed to call/communicate with our office if any new issues, questions, or concerns.     Will plan to see back in 1 year full or sooner PRN

## 2025-03-07 NOTE — ASSESSMENT & PLAN NOTE
Stable mild appearing nonexudative age-related macular degeneration (AMD) right eye (OD). As per left eye (OS) exudative assessment.

## 2025-03-07 NOTE — ASSESSMENT & PLAN NOTE
Stable retinal exam both eyes (OU) without no progression or changes. Advised to continue best lifestyle modifications and given new amsler for monitoring at home. Should call if any new vision issues noted.

## 2025-03-08 PROCEDURE — RXMED WILLOW AMBULATORY MEDICATION CHARGE

## 2025-03-12 ENCOUNTER — ANTICOAGULATION - WARFARIN VISIT (OUTPATIENT)
Dept: CARDIOLOGY | Facility: HOSPITAL | Age: 75
End: 2025-03-12
Payer: MEDICARE

## 2025-03-12 ENCOUNTER — PHARMACY VISIT (OUTPATIENT)
Dept: PHARMACY | Facility: CLINIC | Age: 75
End: 2025-03-12
Payer: COMMERCIAL

## 2025-03-12 DIAGNOSIS — I48.0 PAROXYSMAL ATRIAL FIBRILLATION (MULTI): ICD-10-CM

## 2025-03-12 LAB
POC INR: 2.7
POC PROTHROMBIN TIME: NORMAL

## 2025-03-12 PROCEDURE — 85610 PROTHROMBIN TIME: CPT | Mod: QW

## 2025-03-12 PROCEDURE — 99211 OFF/OP EST MAY X REQ PHY/QHP: CPT | Performed by: INTERNAL MEDICINE

## 2025-03-12 NOTE — PROGRESS NOTES
Patient identification verified with 2 identifiers.    Location: [facility] - 1st Floor Anticoagulation Clinic [address] [id number]    Referring Physician: DR Abad  Enrollment/ Re-enrollment date:    INR Goal: 2.0-3.0  INR monitoring is per [facility] protocol.  Anticoagulation Medication: warfarin  Indication: Atrial Fibrillation/Atrial Flutter    Subjective   Bleeding signs/symptoms: No    Bruising: No   Major bleeding event: No  Thrombosis signs/symptoms: No  Thromboembolic event: No  Missed doses: No  Extra doses: No  Medication changes: No  Dietary changes: No  Change in health: No  Change in activity: No  Alcohol: No  Other concerns: No    Upcoming Procedures:  Does the Patient Have any upcoming procedures that require interruption in anticoagulation therapy? no  Does the patient require bridging? no      Anticoagulation Summary  As of 3/12/2025      INR goal:  2.0-3.0   TTR:  59.2% (1.3 y)   INR used for dosin.70 (3/12/2025)   Weekly warfarin total:  18 mg               Assessment/Plan   Therapeutic     1. New dose: no change    2. Next INR: 1 month      Education provided to patient during the visit:  Patient instructed to call in interim with questions, concerns and changes.

## 2025-04-09 ENCOUNTER — ANTICOAGULATION - WARFARIN VISIT (OUTPATIENT)
Dept: CARDIOLOGY | Facility: HOSPITAL | Age: 75
End: 2025-04-09
Payer: MEDICARE

## 2025-04-09 DIAGNOSIS — I48.0 PAROXYSMAL ATRIAL FIBRILLATION (MULTI): ICD-10-CM

## 2025-04-09 LAB
POC INR: 2.2
POC PROTHROMBIN TIME: NORMAL

## 2025-04-09 PROCEDURE — 85610 PROTHROMBIN TIME: CPT | Mod: QW

## 2025-04-09 PROCEDURE — 99211 OFF/OP EST MAY X REQ PHY/QHP: CPT | Performed by: INTERNAL MEDICINE

## 2025-04-09 NOTE — PROGRESS NOTES
Patient identification verified with 2 identifiers.    Location: [facility] - 1st Floor Anticoagulation Clinic [address] [phone]    Referring Physician: DR Abad  Enrollment/ Re-enrollment date:    INR Goal: 2.0-3.0  INR monitoring is per [facility] protocol.  Anticoagulation Medication: warfarin  Indication: Atrial Fibrillation/Atrial Flutter    Subjective   Bleeding signs/symptoms: No    Bruising: No   Major bleeding event: No  Thrombosis signs/symptoms: No  Thromboembolic event: No  Missed doses: No  Extra doses: No  Medication changes: No  Dietary changes: No  Change in health: No  Change in activity: No  Alcohol: No  Other concerns: No    Upcoming Procedures:  Does the Patient Have any upcoming procedures that require interruption in anticoagulation therapy? no  Does the patient require bridging? no      Anticoagulation Summary  As of 2025      INR goal:  2.0-3.0   TTR:  61.4% (1.4 y)   INR used for dosin.20 (2025)   Weekly warfarin total:  18 mg               Assessment/Plan   Therapeutic     1. New dose: no change    2. Next INR: 1 month      Education provided to patient during the visit:  Patient instructed to call in interim with questions, concerns and changes.

## 2025-04-16 ENCOUNTER — HOSPITAL ENCOUNTER (EMERGENCY)
Facility: HOSPITAL | Age: 75
Discharge: HOME | End: 2025-04-16
Payer: MEDICARE

## 2025-04-16 VITALS
DIASTOLIC BLOOD PRESSURE: 77 MMHG | BODY MASS INDEX: 21.5 KG/M2 | WEIGHT: 137 LBS | RESPIRATION RATE: 18 BRPM | SYSTOLIC BLOOD PRESSURE: 130 MMHG | HEART RATE: 60 BPM | HEIGHT: 67 IN | OXYGEN SATURATION: 95 % | TEMPERATURE: 97.7 F

## 2025-04-16 DIAGNOSIS — R35.0 URINARY FREQUENCY: Primary | ICD-10-CM

## 2025-04-16 LAB
APPEARANCE UR: CLEAR
BILIRUB UR STRIP.AUTO-MCNC: NEGATIVE MG/DL
COLOR UR: COLORLESS
GLUCOSE UR STRIP.AUTO-MCNC: NORMAL MG/DL
KETONES UR STRIP.AUTO-MCNC: NEGATIVE MG/DL
LEUKOCYTE ESTERASE UR QL STRIP.AUTO: NEGATIVE
NITRITE UR QL STRIP.AUTO: NEGATIVE
PH UR STRIP.AUTO: 6.5 [PH]
PROT UR STRIP.AUTO-MCNC: NEGATIVE MG/DL
RBC # UR STRIP.AUTO: NEGATIVE MG/DL
SP GR UR STRIP.AUTO: 1.01
UROBILINOGEN UR STRIP.AUTO-MCNC: NORMAL MG/DL

## 2025-04-16 PROCEDURE — 81003 URINALYSIS AUTO W/O SCOPE: CPT

## 2025-04-16 PROCEDURE — 99283 EMERGENCY DEPT VISIT LOW MDM: CPT

## 2025-04-16 ASSESSMENT — LIFESTYLE VARIABLES
EVER FELT BAD OR GUILTY ABOUT YOUR DRINKING: NO
TOTAL SCORE: 0
HAVE YOU EVER FELT YOU SHOULD CUT DOWN ON YOUR DRINKING: NO
EVER HAD A DRINK FIRST THING IN THE MORNING TO STEADY YOUR NERVES TO GET RID OF A HANGOVER: NO
HAVE PEOPLE ANNOYED YOU BY CRITICIZING YOUR DRINKING: NO

## 2025-04-16 ASSESSMENT — PAIN SCALES - GENERAL
PAINLEVEL_OUTOF10: 0 - NO PAIN
PAINLEVEL_OUTOF10: 0 - NO PAIN

## 2025-04-16 ASSESSMENT — COLUMBIA-SUICIDE SEVERITY RATING SCALE - C-SSRS
2. HAVE YOU ACTUALLY HAD ANY THOUGHTS OF KILLING YOURSELF?: NO
6. HAVE YOU EVER DONE ANYTHING, STARTED TO DO ANYTHING, OR PREPARED TO DO ANYTHING TO END YOUR LIFE?: NO
1. IN THE PAST MONTH, HAVE YOU WISHED YOU WERE DEAD OR WISHED YOU COULD GO TO SLEEP AND NOT WAKE UP?: NO

## 2025-04-16 ASSESSMENT — PAIN - FUNCTIONAL ASSESSMENT: PAIN_FUNCTIONAL_ASSESSMENT: 0-10

## 2025-04-16 NOTE — ED TRIAGE NOTES
Pt states that he has had increase in urination frequency. Pt states that he is uncomfortable but not in pain. Pt states this has been happening for the last two days. Pt denies burning while urinating, denies abdominal pain at this time

## 2025-04-17 LAB — HOLD SPECIMEN: NORMAL

## 2025-04-17 NOTE — ED PROVIDER NOTES
HPI   Chief Complaint   Patient presents with    Urinary Frequency       HPI  Patient is a 74-year-old male presents ED for urinary frequency.  Denies any pain.  Denies any fever or chills.  Denies any history of UTI.  Denies any abdominal pain.  Denies any pelvic pain.  No other acute complaints.      Patient History   Medical History[1]  Surgical History[2]  Family History[3]  Social History[4]    Physical Exam   ED Triage Vitals [04/16/25 1821]   Temperature Heart Rate Respirations BP   36.5 °C (97.7 °F) 60 18 130/77      Pulse Ox Temp Source Heart Rate Source Patient Position   95 % Temporal Monitor Sitting      BP Location FiO2 (%)     Left arm --       Physical Exam  Vitals reviewed.   Constitutional:       General: She is not in acute distress.     Appearance: Normal appearance. She is not ill-appearing.   HENT:      Head: Normocephalic and atraumatic.   Eyes:      Extraocular Movements: Extraocular movements intact.   Cardiovascular:      Rate and Rhythm: Normal rate and regular rhythm.      Heart sounds: Normal heart sounds.   Pulmonary:      Effort: Pulmonary effort is normal.      Breath sounds: Normal breath sounds.   Abdominal:      Palpations: Abdomen is soft.      Tenderness: There is no abdominal tenderness.   Musculoskeletal:         General: Normal range of motion.      Cervical back: Normal range of motion and neck supple.   Skin:     General: Skin is warm and dry.   Neurological:      General: No focal deficit present.      Mental Status: She is alert and oriented to person, place, and time.   Psychiatric:         Mood and Affect: Mood normal.         Behavior: Behavior normal.    ED Course & MDM   Diagnoses as of 04/16/25 2151   Urinary frequency                 No data recorded                                 Medical Decision Making  Parts of this chart have been completed using voice recognition software. Please excuse any errors of transcription.  My thought process and reason for plan has  "been formulated from the time that I saw the patient until the time of disposition and is not specific to one specific moment during their visit and furthermore my MDM encompasses this entire chart and not only this text box.    HPI:   A medically appropriate HPI was obtained, outlined above.    Reggie Munoz is a  74 y.o. male    Chief Complaint   Patient presents with    Urinary Frequency       Medical History[5]    Surgical History[6]    Social History[7]    Family History[8]    Allergies[9]    Current Outpatient Medications   Medication Instructions    albuterol 90 mcg/actuation inhaler 2 puffs, inhalation, Every 6 hours PRN    atorvastatin (LIPITOR) 10 mg, oral, Nightly    digoxin (Lanoxin) 125 MCG tablet TAKE 1 TABLET BY MOUTH DAILY    dilTIAZem CD (Cardizem CD) 180 mg 24 hr capsule TAKE 1 CAPSULE BY MOUTH DAILY    fluticasone-umeclidin-vilanter (Trelegy Ellipta) 100-62.5-25 mcg blister with device Inhale 1 puff by mouth once daily. Rinse mouth out with water after each use.    furosemide (LASIX) 40 mg, oral, Daily    ipratropium-albuteroL (Duo-Neb) 0.5-2.5 mg/3 mL nebulizer solution 3 mL, nebulization, Every 8 hours PRN    losartan (COZAAR) 25 mg, oral, Daily    nebulizers misc 1 Units, miscellaneous, Every 6 hours PRN    warfarin (COUMADIN) 2 mg, oral, See admin instructions, TAKE 1 2 MG PILL BY MOUTH TUESDAY, WEDNESDAY, THURSDAY, SATURDAY AND SUNDAY . TAKE 2 PILLS 4 MG ON MONDAY AND FRIDAY .   for details    Exam:   Patient Vitals for the past 24 hrs:   BP Temp Temp src Pulse Resp SpO2 Height Weight   04/16/25 1821 130/77 36.5 °C (97.7 °F) Temporal 60 18 95 % 1.702 m (5' 7\") 62.1 kg (137 lb)       A medically appropriate exam performed, outlined above, given the known history and presentation.    EKG/Cardiac monitor:   If EKG was done and, it was interpreted by attending physician, see their note for ED course for more detail.    Medications given during visit:  Medications - No data to display "     Diagnostic/tests:  Labs Reviewed   URINALYSIS WITH REFLEX CULTURE AND MICROSCOPIC - Abnormal       Result Value    Color, Urine Colorless (*)     Appearance, Urine Clear      Specific Gravity, Urine 1.009      pH, Urine 6.5      Protein, Urine NEGATIVE      Glucose, Urine Normal      Blood, Urine NEGATIVE      Ketones, Urine NEGATIVE      Bilirubin, Urine NEGATIVE      Urobilinogen, Urine Normal      Nitrite, Urine NEGATIVE      Leukocyte Esterase, Urine NEGATIVE     URINALYSIS WITH REFLEX CULTURE AND MICROSCOPIC    Narrative:     The following orders were created for panel order Urinalysis with Reflex Culture and Microscopic.  Procedure                               Abnormality         Status                     ---------                               -----------         ------                     Urinalysis with Reflex C...[[id number]]  Abnormal            Final result               Extra Urine Gray Tube[[id number]]                            In process                   Please view results for these tests on the individual orders.   EXTRA URINE GRAY TUBE        No orders to display          MDM Summary:  No evidence of urinary tract infection or hematuria.  Will refer to urology for further management.    We have discussed the diagnosis and risks, and we agree with discharging home to follow-up with appropriate physician as directed. We also discussed returning to the Emergency Department immediately if new or worsening symptoms occur. We have discussed the symptoms which are most concerning that necessitate immediate return. Pt symptoms have been well controlled here and the patient is safe for discharge with appropriate outpatient follow up. The patient has verbalized understanding to return to ER without delay for new or worsening pains or for any other symptoms or concerns. I utilized the discharge clinical management tool provided [employer] to help estimate risk of negative outcome for this  patient.        Disposition:  ED Prescriptions    None         All of the patient's questions were answered to the best of my ability. Patient states understanding that they have been screened for an emergency today and we have not found any etiology of symptoms that requires emergent treatment or admission to the hospital at this point. They understand that they have not had definitive care day and require follow-up for treatment of their condition. They also state understanding that they may have an emergent condition that may potentially have not of detected at this visit and they must return to the emergency department if they develop any worsening of symptoms or new complaints.       Procedure  Procedures       [1]   Past Medical History:  Diagnosis Date    A-fib (Multi)     COPD (chronic obstructive pulmonary disease) (Multi)     Hyperlipidemia     Hypertension     Low back pain     Nonexudative age-related macular degeneration, right eye, early dry stage     Osteopenia     Other secondary cataract, bilateral     Personal history of other diseases of the circulatory system     History of angina    Personal history of other diseases of the circulatory system     History of hypertension    Personal history of other diseases of the respiratory system     History of chronic obstructive lung disease    Pseudophakia     Refractive error     Spinal stenosis     Unspecified asthma with (acute) exacerbation ([facility])     Asthma with acute exacerbation, unspecified asthma severity, unspecified whether persistent   [2]   Past Surgical History:  Procedure Laterality Date    CATARACT EXTRACTION      IR ANGIOGRAM PULMONARY Bilateral 04/10/2012    IR ANGIOGRAM PULMONARY [facility]    OTHER SURGICAL HISTORY  07/23/2021    No history of surgery    SINUS SURGERY     [3]   Family History  Problem Relation Name Age of Onset    Stroke Mother Heather Solis     Alzheimer's disease Mother Heather Solis     Lung cancer  Father Reggie Munoz     Cancer Father Reggie Munoz     Stroke Sister Dianne Harper     Cancer Sister Dianne Harper    [4]   Social History  Tobacco Use    Smoking status: Never     Passive exposure: Never    Smokeless tobacco: Never   Vaping Use    Vaping status: Never Used   Substance Use Topics    Alcohol use: Never    Drug use: Never   [5]   Past Medical History:  Diagnosis Date    A-fib (Multi)     COPD (chronic obstructive pulmonary disease) (Multi)     Hyperlipidemia     Hypertension     Low back pain     Nonexudative age-related macular degeneration, right eye, early dry stage     Osteopenia     Other secondary cataract, bilateral     Personal history of other diseases of the circulatory system     History of angina    Personal history of other diseases of the circulatory system     History of hypertension    Personal history of other diseases of the respiratory system     History of chronic obstructive lung disease    Pseudophakia     Refractive error     Spinal stenosis     Unspecified asthma with (acute) exacerbation ([facility])     Asthma with acute exacerbation, unspecified asthma severity, unspecified whether persistent   [6]   Past Surgical History:  Procedure Laterality Date    CATARACT EXTRACTION      IR ANGIOGRAM PULMONARY Bilateral 04/10/2012    IR ANGIOGRAM PULMONARY [facility]    OTHER SURGICAL HISTORY  07/23/2021    No history of surgery    SINUS SURGERY     [7]   Social History  Tobacco Use    Smoking status: Never     Passive exposure: Never    Smokeless tobacco: Never   Vaping Use    Vaping status: Never Used   Substance Use Topics    Alcohol use: Never    Drug use: Never   [8]   Family History  Problem Relation Name Age of Onset    Stroke Mother Heather Solis     Alzheimer's disease Mother Heather Solis     Lung cancer Father Reggie Munoz     Cancer Father Reggie Munoz     Stroke Sister Dianne Harper     Cancer Sister Dianne Harper     [9] No Known Allergies       Blayne Haq PA-C  04/16/25 [id number]

## 2025-04-24 ENCOUNTER — APPOINTMENT (OUTPATIENT)
Facility: CLINIC | Age: 75
End: 2025-04-24
Payer: MEDICARE

## 2025-04-28 DIAGNOSIS — J44.9 CHRONIC OBSTRUCTIVE PULMONARY DISEASE, UNSPECIFIED COPD TYPE (MULTI): Primary | ICD-10-CM

## 2025-05-01 ENCOUNTER — TELEMEDICINE (OUTPATIENT)
Dept: PHARMACY | Facility: HOSPITAL | Age: 75
End: 2025-05-01
Payer: MEDICARE

## 2025-05-01 DIAGNOSIS — J44.9 CHRONIC OBSTRUCTIVE PULMONARY DISEASE, UNSPECIFIED COPD TYPE (MULTI): Primary | ICD-10-CM

## 2025-05-01 DIAGNOSIS — J44.1 ACUTE EXACERBATION OF CHRONIC OBSTRUCTIVE PULMONARY DISEASE (COPD) (MULTI): ICD-10-CM

## 2025-05-01 NOTE — PROGRESS NOTES
Outpatient Clinical Pharmacy Visit  Reggie Munoz is a 74 y.o. male who was referred to the ambulatory Clinical Pharmacy Team for their COPD.    Referring Provider: Rajwinder Dawson MD    Medication Access  Cost barriers: N/A  Enrolled in  PAP: Yes, expires 5/2025 (Trelegy)  Manages own medication: Yes, and sister Velia may assist  Transportation to the pharmacy: N/A  Frequency of missed doses: Rare    COPD ASSESSMENT  For today's visit, I spoke with Reggie at [phone] to discuss his tolerability to Trelegy and also his sister, Velia Morrison, at [phone], to discuss tolerability to Trelegy and financials/re-enrollment in  PAP.     Today, Reggie notes that he is doing quite well on the Trelegy and has not needed to use his albuterol at all! His sister Velia agrees, as he has not needed to be hospitalized for his COPD once since being maintained on Trelegy.     Current COPD Pharmacotherapy  Trelegy Ellipta 100-62.5-25 mcg once daily  Albuterol HFA PRN- not really using  Duonebs PRN- not really using    Inhaler Use:  How many times per week do you use your rescue inhaler? Not using  How many days per week do you remember to use your maintenance inhaler? Every day  Do you often miss the second dose (if applicable) of your maintenance inhaler? N/A    Inhaler Technique:  How do you use your rescue inhaler? N/A  How do you use your maintenance inhaler? Verbalizes proficiency in use and also confirms rinsing his mouth out after each morning dose as previously instructed.    Secondary Prevention (vaccines):  Influenza: Date [Unknown]  PCV13: Date [9/7/2018]  PPSV23: Date [6/4/2013, 7/2/2016, 10/20/2016]  PCV20: Date [1/15/2024]    Symptom Management:  Increased cough? Denies  Increased sputum production? Denies  Increased SOB? Denies    Exacerbation History:  When was your last hospitalization for an exacerbation? 3/18/24  When was the last time you were treated with antibiotics and/or steroids? Cannot recall; has been  a long time    Smoking Cessation  Denies smoking     PAP Eligibility Assessment  This patient has expressed a need for medication cost assistance and will be considered for the  Patient Assistance Program ( PAP). If patient is approved, the below medications would result in a $0 cost/month for the patient. If approved, patient must then provide updated financial documents each calendar year and have a new prescription issued by a  Meds pharmacist to continue receiving medications at $0 cost.    Medications  Trelegy    Eligibility Requirements  [x] Patient has pre-existing prescription coverage  [x] Medication(s) above are listed on  PAP formulary  [x] Income: Less than or equal to 400% Federal Poverty Limit  Household Persons: 1  [x] Patient's prescription insurance is contracted with either [facility] or  Specialty Pharmacy    2025* Poverty Guidelines for the [place] [facility] [place]   Persons in Family/Household Poverty Guideline Annual 400% Monthly 400%   1 $15,650 $62,600 $5,216   2 $21,150 $84,600 $7,050   3 $26,650 $106,600 $8,883   4 $32,150 $128,600 $10,716   5 $37,650 $150,600 $12,550   6 $43,150 $172,600 $14,383   7 $48,650 $194,600 $16,216   8 $54,150 $216,600 $18,050   *[facility] updates FPL guidelines in late January of each calendar year.      Initial Eligibility Determination  Note: Clinical Pharmacy is not responsible for finalizing eligibility approval. The  PAP team determines final eligibility.  This patient will likely qualify for the  PAP program.    Financial Documents Required for Application Submission  [] 1040 Tax Form     Laboratory Results  Lab Results   Component Value Date    BILITOT 0.6 11/03/2024    CALCIUM 9.7 11/03/2024    CO2 29 11/03/2024     11/03/2024    CREATININE 0.96 11/03/2024    GLUCOSE 85 11/03/2024    ALKPHOS 99 11/03/2024    K 3.6 11/03/2024    PROT 6.5 11/03/2024     11/03/2024    AST 18 11/03/2024    ALT 16 11/03/2024    BUN 20 11/03/2024    ANIONGAP  12 11/03/2024    MG 1.90 10/31/2023     (H) 06/28/2020    ALBUMIN 4.0 11/03/2024    LIPASE <16 (L) 12/31/2023    EGFR 85 11/03/2024     Lab Results   Component Value Date    TRIG 148 04/15/2024    CHOL 181 04/15/2024    HDL 62.0 04/15/2024     Lab Results   Component Value Date    HGBA1C 6.3 (H) 04/15/2024       Assessment/Plan   Problem List Items Addressed This Visit    None  Visit Diagnoses         Chronic obstructive pulmonary disease, unspecified COPD type (Multi)    -  Primary    Relevant Orders    Referral to Clinical Pharmacy          Assessment  Reggie's breathing appears to be very well controlled     Plan/Changes   Continue all meds under the continuation of care with the referring provider and clinical pharmacy team  Specialists: Patient currently sees outpatient ***.      Next PCP Follow-Up  ***    Next Clinical Pharmacy Follow-Up  ***  ***      Olga Lidia Henderson, PharmD     Verbal consent to manage patient's drug therapy was obtained from ***. They were informed they may decline to participate or withdraw from participation in pharmacy services at any time.

## 2025-05-01 NOTE — Clinical Note
Re-enrollment in the  PAP program for Reggie's Trelegy has been re-started. No hospitalizations for COPD or exacerbations of COPD since he's been maintained on Trelegy!

## 2025-05-07 ENCOUNTER — ANTICOAGULATION - WARFARIN VISIT (OUTPATIENT)
Dept: CARDIOLOGY | Facility: HOSPITAL | Age: 75
End: 2025-05-07
Payer: MEDICARE

## 2025-05-07 DIAGNOSIS — I48.0 PAROXYSMAL ATRIAL FIBRILLATION (MULTI): ICD-10-CM

## 2025-05-07 LAB
POC INR: 1.3 (ref 0.9–1.1)
POC PROTHROMBIN TIME: ABNORMAL (ref 9.3–12.5)

## 2025-05-07 PROCEDURE — 99211 OFF/OP EST MAY X REQ PHY/QHP: CPT | Performed by: INTERNAL MEDICINE

## 2025-05-07 PROCEDURE — 85610 PROTHROMBIN TIME: CPT | Mod: QW

## 2025-05-07 NOTE — PROGRESS NOTES
Patient identification verified with 2 identifiers.    Location: [facility] - 1st Floor Anticoagulation Clinic [address] [id number]    Referring Physician: DR Abad  Enrollment/ Re-enrollment date:    INR Goal: 2.0-3.0  INR monitoring is per [facility] protocol.  Anticoagulation Medication: warfarin  Indication: Atrial Fibrillation/Atrial Flutter    Subjective   Bleeding signs/symptoms: No    Bruising: No   Major bleeding event: No  Thrombosis signs/symptoms: No  Thromboembolic event: No  Missed doses: No  Extra doses: No  Medication changes: No  Dietary changes: No  Change in health: No  Change in activity: No  Alcohol: No  Other concerns: No    Upcoming Procedures:  Does the Patient Have any upcoming procedures that require interruption in anticoagulation therapy? no  Does the patient require bridging? no      Anticoagulation Summary  As of 2025      INR goal:  2.0-3.0   TTR:  59.4% (1.5 y)   INR used for dosin.30 (2025)   Weekly warfarin total:  18 mg               Assessment/Plan   Subtherapeutic     1. New dose: no change    2. Next INR: 1 month      Education provided to patient during the visit:  Patient instructed to call in interim with questions, concerns and changes.

## 2025-05-15 ENCOUNTER — APPOINTMENT (OUTPATIENT)
Dept: PHARMACY | Facility: HOSPITAL | Age: 75
End: 2025-05-15
Payer: MEDICARE

## 2025-05-15 PROCEDURE — RXMED WILLOW AMBULATORY MEDICATION CHARGE

## 2025-05-15 RX ORDER — FLUTICASONE FUROATE, UMECLIDINIUM BROMIDE AND VILANTEROL TRIFENATATE 100; 62.5; 25 UG/1; UG/1; UG/1
POWDER RESPIRATORY (INHALATION)
Qty: 180 EACH | Refills: 3 | Status: SHIPPED | OUTPATIENT
Start: 2025-05-15

## 2025-05-15 RX ORDER — ALBUTEROL SULFATE 90 UG/1
2 INHALANT RESPIRATORY (INHALATION) EVERY 6 HOURS PRN
Qty: 6.7 G | Refills: 1 | Status: SHIPPED | OUTPATIENT
Start: 2025-05-15

## 2025-05-19 ENCOUNTER — PHARMACY VISIT (OUTPATIENT)
Dept: PHARMACY | Facility: CLINIC | Age: 75
End: 2025-05-19
Payer: COMMERCIAL

## 2025-06-02 ENCOUNTER — HOSPITAL ENCOUNTER (EMERGENCY)
Facility: HOSPITAL | Age: 75
Discharge: HOME | End: 2025-06-02
Payer: MEDICARE

## 2025-06-02 ENCOUNTER — APPOINTMENT (OUTPATIENT)
Dept: RADIOLOGY | Facility: HOSPITAL | Age: 75
End: 2025-06-02
Payer: MEDICARE

## 2025-06-02 VITALS
DIASTOLIC BLOOD PRESSURE: 76 MMHG | HEART RATE: 96 BPM | TEMPERATURE: 98.2 F | OXYGEN SATURATION: 100 % | SYSTOLIC BLOOD PRESSURE: 154 MMHG | RESPIRATION RATE: 18 BRPM

## 2025-06-02 DIAGNOSIS — S93.402A SPRAIN OF LEFT ANKLE, UNSPECIFIED LIGAMENT, INITIAL ENCOUNTER: ICD-10-CM

## 2025-06-02 DIAGNOSIS — R68.83 CHILLS: Primary | ICD-10-CM

## 2025-06-02 LAB
FLUAV RNA RESP QL NAA+PROBE: NOT DETECTED
FLUBV RNA RESP QL NAA+PROBE: NOT DETECTED
RSV RNA RESP QL NAA+PROBE: NOT DETECTED
SARS-COV-2 RNA RESP QL NAA+PROBE: NOT DETECTED

## 2025-06-02 PROCEDURE — 99284 EMERGENCY DEPT VISIT MOD MDM: CPT | Mod: 25

## 2025-06-02 PROCEDURE — 73610 X-RAY EXAM OF ANKLE: CPT | Mod: LT

## 2025-06-02 PROCEDURE — 73610 X-RAY EXAM OF ANKLE: CPT | Mod: LEFT SIDE | Performed by: RADIOLOGY

## 2025-06-02 PROCEDURE — 71046 X-RAY EXAM CHEST 2 VIEWS: CPT | Performed by: STUDENT IN AN ORGANIZED HEALTH CARE EDUCATION/TRAINING PROGRAM

## 2025-06-02 PROCEDURE — 71046 X-RAY EXAM CHEST 2 VIEWS: CPT

## 2025-06-02 PROCEDURE — 87637 SARSCOV2&INF A&B&RSV AMP PRB: CPT

## 2025-06-02 ASSESSMENT — LIFESTYLE VARIABLES
EVER FELT BAD OR GUILTY ABOUT YOUR DRINKING: NO
EVER HAD A DRINK FIRST THING IN THE MORNING TO STEADY YOUR NERVES TO GET RID OF A HANGOVER: NO
HAVE PEOPLE ANNOYED YOU BY CRITICIZING YOUR DRINKING: NO
HAVE YOU EVER FELT YOU SHOULD CUT DOWN ON YOUR DRINKING: NO
TOTAL SCORE: 0

## 2025-06-02 NOTE — DISCHARGE INSTRUCTIONS
Please follow-up with your primary care provider.  Testing today is negative.  No need for antibiotics.  If you are feeling febrile, you can use Motrin versus Tylenol.  Follow with your PCP.    Be sure to take all medications, over the counter medications or prescription medications only as directed.    Be sure to follow up as directed in 1-2 days. All of the details of your follow up instructions are detailed in the follow up section of this packet.    If you are being discharged with any pains medications or muscle relaxers (norco, Vicodin, hydrocodone products, Percocet, oxycodone products, flexeril, cyclobenzaprine, robaxin, norflex, brand or generic, or any other pain controlling medications with the exception of Ibuprofen and regular Tylenol, do not drive or operate machinery, climb ladders or participate in any activity that could potentially put yourself or others at risk should you get dizzy, or be/feel impaired at all.    Return to emergency room without delay for ANY new or worsening pains or for any other symptoms or concerns. Return with worsening pains, nausea, vomiting, trouble breathing, palpitations, shortness of breath, inability to pass stool or urine, loss of control of stool or urine, any numbness or tingling (that is not normal for you), uncontrolled fevers, the passing of blood or other material in stool or urine, rashes, pains or for any other symptoms or concerns you may have. You are always welcome to return to the ER at any time for any reason or for any other concerns you may have.

## 2025-06-02 NOTE — ED PROVIDER NOTES
HPI   Chief Complaint   Patient presents with    Flu Symptoms       Patient is a 74-year-old male presenting with chills.  Reportedly chills began today.  He has been having a cough that has been nonproductive for 2 days.  States he is primarily here because of the chills.  No documented fevers.  He did have a fall yesterday and did twist his left ankle.  Typically gets around with a walker and has been doing okay.  Has been taking ibuprofen for discomfort.  No sick contacts.  Patient denies fevers, sore throat, runny nose, chest pain, shortness of breath, abdominal pain, nausea, vomiting, diarrhea or urinary complaints.              Patient History   Medical History[1]  Surgical History[2]  Family History[3]  Social History[4]    Physical Exam   ED Triage Vitals [06/02/25 1633]   Temperature Heart Rate Respirations BP   36.8 °C (98.2 °F) 96 18 154/76      Pulse Ox Temp Source Heart Rate Source Patient Position   100 % Tympanic Monitor Sitting      BP Location FiO2 (%)     Right arm --       Physical Exam  Vitals and nursing note reviewed.   Constitutional:       Appearance: He is well-developed.      Comments: Awake, sitting in examination chair   HENT:      Head: Normocephalic and atraumatic.      Nose: Nose normal.      Mouth/Throat:      Mouth: Mucous membranes are moist.      Pharynx: Oropharynx is clear.   Eyes:      Extraocular Movements: Extraocular movements intact.      Conjunctiva/sclera: Conjunctivae normal.      Pupils: Pupils are equal, round, and reactive to light.   Cardiovascular:      Rate and Rhythm: Normal rate and regular rhythm.      Pulses: Normal pulses.      Heart sounds: Normal heart sounds. No murmur heard.  Pulmonary:      Effort: Pulmonary effort is normal. No respiratory distress.      Breath sounds: Normal breath sounds.   Abdominal:      General: Abdomen is flat.      Palpations: Abdomen is soft.      Tenderness: There is no abdominal tenderness.   Musculoskeletal:         General: No  swelling. Normal range of motion.      Cervical back: Normal range of motion and neck supple.      Comments: Nontender with manipulation of left ankle   Skin:     General: Skin is warm and dry.      Capillary Refill: Capillary refill takes less than 2 seconds.   Neurological:      General: No focal deficit present.      Mental Status: He is alert and oriented to person, place, and time.   Psychiatric:         Mood and Affect: Mood normal.         Behavior: Behavior normal.           ED Course & MDM   Diagnoses as of 06/02/25 1921   Chills   Sprain of left ankle, unspecified ligament, initial encounter                 No data recorded     Sheron Coma Scale Score: 15 (06/02/25 1632 : Maya Bassett RN)                           Medical Decision Making  Patient is a 74-year-old male presenting with chills.  Viral swabs and imaging ordered.  Conditions considered include but are not limited: Pneumonia, viral illness, ankle sprain, fracture.    Attending physician was available for consultation this patient.  Viral swabs are negative.  Chest x-ray without acute cardiopulmonary process.  Ankle x-ray without acute osseous abnormality does have some soft tissue swelling.    I believe this patient is at low risk for complication, and a disposition of discharge is acceptable.  Return to the Emergency Department if new or worsening symptoms including headache, fever, chills, chest pain, shortness of breath, syncope, near syncope, abdominal pain, nausea, vomiting,  diarrhea, or worsening pain.  Discussed with patient to utilize over-the-counter medication as needed for symptom relief.  At this time, do not need to start antibiotics.  Patient is agreeable to a disposition of discharge and to follow-up with respective fields in the next several days.    Portions of this note made with Dragon software, please be mindful of potential grammatical errors.      Labs Reviewed   SARS-COV-2, INFLUENZA A/B AND RSV PCR - Normal        Result Value    Coronavirus 2019, PCR Not Detected      Flu A Result Not Detected      Flu B Result Not Detected      RSV PCR Not Detected      Narrative:     This assay is an FDA-cleared, in vitro diagnostic nucleic acid amplification test for the qualitative detection and differentiation of SARS CoV-2/ Influenza A/B/ RSV from nasopharyngeal specimens collected from individuals with signs and symptoms of respiratory tract infections, and has been validated for use at [facility]. Negative results do not preclude COVID-19/ Influenza A/B/ RSV infections and should not be used as the sole basis for diagnosis, treatment, or other management decisions. Testing for SARS CoV-2 is recommended only for patients who meet current clinical and/or epidemiological criteria defined by federal, state, or local public health directives.     XR ankle left 3+ views   Final Result   Lateral soft tissue swelling without evidence of left ankle fracture.        Signed by: Chester Rodriguez 6/2/2025 5:44 PM   Dictation workstation:   [id number]      XR chest 2 views   Final Result   1.  No evidence of acute cardiopulmonary process.                  MACRO:   None        Signed by: Damien Moore 6/2/2025 5:07 PM   Dictation workstation:   [id number]            Procedure  Procedures       [1]   Past Medical History:  Diagnosis Date    A-fib (Multi)     COPD (chronic obstructive pulmonary disease) (Multi)     Hyperlipidemia     Hypertension     Low back pain     Nonexudative age-related macular degeneration, right eye, early dry stage     Osteopenia     Other secondary cataract, bilateral     Personal history of other diseases of the circulatory system     History of angina    Personal history of other diseases of the circulatory system     History of hypertension    Personal history of other diseases of the respiratory system     History of chronic obstructive lung disease    Pseudophakia     Refractive error      Spinal stenosis     Unspecified asthma with (acute) exacerbation ([facility])     Asthma with acute exacerbation, unspecified asthma severity, unspecified whether persistent   [2]   Past Surgical History:  Procedure Laterality Date    CATARACT EXTRACTION      IR ANGIOGRAM PULMONARY Bilateral 04/10/2012    IR ANGIOGRAM PULMONARY [facility]    OTHER SURGICAL HISTORY  07/23/2021    No history of surgery    SINUS SURGERY     [3]   Family History  Problem Relation Name Age of Onset    Stroke Mother Heather Solis     Alzheimer's disease Mother Heather Solis     Lung cancer Father Reggie Munoz     Cancer Father Reggie Munoz     Stroke Sister Dianne Harper     Cancer Sister Dianne Harper    [4]   Social History  Tobacco Use    Smoking status: Never     Passive exposure: Never    Smokeless tobacco: Never   Vaping Use    Vaping status: Never Used   Substance Use Topics    Alcohol use: Never    Drug use: Never        Damien Galvez PA-C  06/02/25 1921

## 2025-06-02 NOTE — ED TRIAGE NOTES
Pt c/o chills x 1 day, sore throat and cold sx. Also endorses Left ankle pain from a fall yesterday

## 2025-06-04 ENCOUNTER — ANTICOAGULATION - WARFARIN VISIT (OUTPATIENT)
Dept: CARDIOLOGY | Facility: HOSPITAL | Age: 75
End: 2025-06-04
Payer: MEDICARE

## 2025-06-04 DIAGNOSIS — I48.0 PAROXYSMAL ATRIAL FIBRILLATION (MULTI): ICD-10-CM

## 2025-06-04 LAB
POC INR: 2.7 (ref 0.9–1.1)
POC PROTHROMBIN TIME: ABNORMAL (ref 9.3–12.5)

## 2025-06-04 PROCEDURE — 85610 PROTHROMBIN TIME: CPT | Mod: QW

## 2025-06-04 PROCEDURE — 99211 OFF/OP EST MAY X REQ PHY/QHP: CPT | Performed by: INTERNAL MEDICINE

## 2025-06-04 NOTE — PROGRESS NOTES
Patient identification verified with 2 identifiers.    Location: [facility] - 1st Floor Anticoagulation Clinic [address] [id number]    Referring Physician: DR muller  Enrollment/ Re-enrollment date:    INR Goal: 2.0-3.0  INR monitoring is per [facility] protocol.  Anticoagulation Medication: warfarin  Indication: Atrial Fibrillation/Atrial Flutter    Subjective   Bleeding signs/symptoms: No    Bruising: No   Major bleeding event: No  Thrombosis signs/symptoms: No  Thromboembolic event: No  Missed doses: No  Extra doses: No  Medication changes: No  Dietary changes: No  Change in health: No  Change in activity: No  Alcohol: No  Other concerns: No    Upcoming Procedures:  Does the Patient Have any upcoming procedures that require interruption in anticoagulation therapy? no  Does the patient require bridging? no      Anticoagulation Summary  As of 2025      INR goal:  2.0-3.0   TTR:  58.9% (1.6 y)   INR used for dosin.70 (2025)   Weekly warfarin total:  18 mg               Assessment/Plan   Therapeutic     1. New dose: no change    2. Next INR: 1 month      Education provided to patient during the visit:  Patient instructed to call in interim with questions, concerns and changes.

## 2025-06-12 ENCOUNTER — OFFICE VISIT (OUTPATIENT)
Facility: CLINIC | Age: 75
End: 2025-06-12
Payer: MEDICARE

## 2025-06-12 VITALS
SYSTOLIC BLOOD PRESSURE: 100 MMHG | WEIGHT: 129 LBS | HEART RATE: 56 BPM | OXYGEN SATURATION: 96 % | DIASTOLIC BLOOD PRESSURE: 68 MMHG | BODY MASS INDEX: 20.2 KG/M2

## 2025-06-12 DIAGNOSIS — I48.20 CHRONIC ATRIAL FIBRILLATION (MULTI): Primary | ICD-10-CM

## 2025-06-12 DIAGNOSIS — I10 BENIGN HYPERTENSION: ICD-10-CM

## 2025-06-12 DIAGNOSIS — Z11.59 MEASLES SCREENING: ICD-10-CM

## 2025-06-12 DIAGNOSIS — B05.9 MEASLES: ICD-10-CM

## 2025-06-12 PROCEDURE — 1126F AMNT PAIN NOTED NONE PRSNT: CPT | Performed by: INTERNAL MEDICINE

## 2025-06-12 PROCEDURE — 3078F DIAST BP <80 MM HG: CPT | Performed by: INTERNAL MEDICINE

## 2025-06-12 PROCEDURE — 1036F TOBACCO NON-USER: CPT | Performed by: INTERNAL MEDICINE

## 2025-06-12 PROCEDURE — 99213 OFFICE O/P EST LOW 20 MIN: CPT | Performed by: INTERNAL MEDICINE

## 2025-06-12 PROCEDURE — 1159F MED LIST DOCD IN RCRD: CPT | Performed by: INTERNAL MEDICINE

## 2025-06-12 PROCEDURE — 3074F SYST BP LT 130 MM HG: CPT | Performed by: INTERNAL MEDICINE

## 2025-06-12 ASSESSMENT — ENCOUNTER SYMPTOMS
SYNCOPE: 0
FEVER: 0
DIZZINESS: 0
DEPRESSION: 0
SHORTNESS OF BREATH: 0
IRREGULAR HEARTBEAT: 0
ORTHOPNEA: 0
CLAUDICATION: 0
WEIGHT GAIN: 0
WEIGHT LOSS: 0
WEAKNESS: 0
PND: 0
WHEEZING: 0
DIAPHORESIS: 0
MYALGIAS: 0
PALPITATIONS: 0
OCCASIONAL FEELINGS OF UNSTEADINESS: 0
LOSS OF SENSATION IN FEET: 0
COUGH: 0
NEAR-SYNCOPE: 0
DYSPNEA ON EXERTION: 0

## 2025-06-12 ASSESSMENT — PAIN SCALES - GENERAL: PAINLEVEL_OUTOF10: 0-NO PAIN

## 2025-06-12 ASSESSMENT — PATIENT HEALTH QUESTIONNAIRE - PHQ9
1. LITTLE INTEREST OR PLEASURE IN DOING THINGS: NOT AT ALL
SUM OF ALL RESPONSES TO PHQ9 QUESTIONS 1 AND 2: 0
2. FEELING DOWN, DEPRESSED OR HOPELESS: NOT AT ALL

## 2025-06-12 ASSESSMENT — LIFESTYLE VARIABLES: TOTAL SCORE: 0

## 2025-06-12 NOTE — PROGRESS NOTES
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  He also has a history of paroxysmal atrial fibrillation and has been on oral anticoagulation in the form of Coumadin managed by the coumadin clinic.  He has  severe COPD.          Review of Systems   Constitutional: Negative for diaphoresis, fever, weight gain and weight loss.   Eyes:  Negative for visual disturbance.   Cardiovascular:  Negative for chest pain, claudication, dyspnea on exertion, irregular heartbeat, leg swelling, near-syncope, orthopnea, palpitations, paroxysmal nocturnal dyspnea and syncope.   Respiratory:  Negative for cough, shortness of breath and wheezing.    Musculoskeletal:  Negative for muscle weakness and myalgias.   Neurological:  Negative for dizziness and weakness.   All other systems reviewed and are negative.       Medical History[1]     Surgical History[2]     Social History     Socioeconomic History    Marital status: Single     Spouse name: Not on file    Number of children: Not on file    Years of education: Not on file    Highest education level: Not on file   Occupational History    Not on file   Tobacco Use    Smoking status: Never     Passive exposure: Never    Smokeless tobacco: Never   Vaping Use    Vaping status: Never Used   Substance and Sexual Activity    Alcohol use: Never    Drug use: Never    Sexual activity: Defer   Other Topics Concern    Not on file   Social History Narrative    Not on file     Social Drivers of Health     Financial Resource Strain: Low Risk  (3/18/2024)    Overall Financial Resource Strain (CARDIA)     Difficulty of Paying Living Expenses: Not very hard   Food Insecurity: No Food Insecurity (3/18/2024)    Hunger Vital Sign     Worried About Running Out of Food in the Last Year: Never true     Ran Out of Food in the Last Year: Never true   Transportation Needs: No Transportation Needs (3/18/2024)     PRAPARE - Transportation     Lack of Transportation (Medical): No     Lack of Transportation (Non-Medical): No   Physical Activity: Inactive (3/18/2024)    Exercise Vital Sign     Days of Exercise per Week: 0 days     Minutes of Exercise per Session: 0 min   Stress: No Stress Concern Present (3/18/2024)    [employer] [place] [employer] - Occupational Stress Questionnaire     Feeling of Stress : Not at all   Social Connections: Moderately Isolated (3/18/2024)    Social Connection and Isolation Panel [NHANES]     Frequency of Communication with Friends and Family: More than three times a week     Frequency of Social Gatherings with Friends and Family: Three times a week     Attends [employer] Services: More than 4 times per year     Active Member of Clubs or Organizations: No     Attends Club or Organization Meetings: Never     Marital Status: Never    Intimate Partner Violence: Not At Risk (3/18/2024)    Humiliation, Afraid, Rape, and Kick questionnaire     Fear of Current or Ex-Partner: No     Emotionally Abused: No     Physically Abused: No     Sexually Abused: No   Housing Stability: Low Risk  (3/18/2024)    Housing Stability Vital Sign     Unable to Pay for Housing in the Last Year: No     Number of Places Lived in the Last Year: 1     Unstable Housing in the Last Year: No        Family History[3]     OBJECTIVE:    Vitals:    06/12/25 1354   BP: 100/68   Pulse: 56   SpO2: 96%        Vitals reviewed.   Constitutional:       Appearance: Normal and healthy appearance. Not in distress.   Pulmonary:      Effort: Pulmonary effort is normal.      Breath sounds: Normal breath sounds.   Cardiovascular:      Normal rate. Regular rhythm. Normal S1. Normal S2.       Murmurs: There is no murmur.      No gallop.  No click.   Pulses:     Intact distal pulses.   Edema:     Peripheral edema absent.   Skin:     General: Skin is warm and dry.   Neurological:      General: No focal deficit present.          Lab  Review:   Lab Results   Component Value Date    CHOL 181 04/15/2024    TRIG 148 04/15/2024    HDL 62.0 04/15/2024       Lab Results   Component Value Date    LDLCALC 89 04/15/2024        Chronic atrial fibrillation (Multi)  Stable.  Will continue rate control with diltiazem and digoxin as he is tolerating this rather well.  Coumadin for oral anticoagulation monitored by the Coumadin clinic.    Benign hypertension  Controlled.  Continue losartan.  Advised to check his blood pressure 2-3 times a week at home. Lifestyle modifications were discussed. I advocated for mediterranean and plant based eating. We also discussed exercise. 150 minutes a week of moderate intensity exercise is recommended per our ACC/AHA guidelines            [1]   Past Medical History:  Diagnosis Date    A-fib (Multi)     COPD (chronic obstructive pulmonary disease) (Multi)     Hyperlipidemia     Hypertension     Low back pain     Nonexudative age-related macular degeneration, right eye, early dry stage     Osteopenia     Other secondary cataract, bilateral     Personal history of other diseases of the circulatory system     History of angina    Personal history of other diseases of the circulatory system     History of hypertension    Personal history of other diseases of the respiratory system     History of chronic obstructive lung disease    Pseudophakia     Refractive error     Spinal stenosis     Unspecified asthma with (acute) exacerbation ([facility])     Asthma with acute exacerbation, unspecified asthma severity, unspecified whether persistent   [2]   Past Surgical History:  Procedure Laterality Date    CATARACT EXTRACTION      IR ANGIOGRAM PULMONARY Bilateral 04/10/2012    IR ANGIOGRAM PULMONARY [facility]    OTHER SURGICAL HISTORY  07/23/2021    No history of surgery    SINUS SURGERY     [3]   Family History  Problem Relation Name Age of Onset    Stroke Mother Heather Solis     Alzheimer's disease Mother Heather HEREDIA Irma      Lung cancer Father Reggie Munoz     Cancer Father Reggie Munoz     Stroke Sister Dianne Harper     Cancer Sister Dianne Harper

## 2025-07-02 ENCOUNTER — ANTICOAGULATION - WARFARIN VISIT (OUTPATIENT)
Dept: CARDIOLOGY | Facility: HOSPITAL | Age: 75
End: 2025-07-02
Payer: MEDICARE

## 2025-07-02 DIAGNOSIS — I48.0 PAROXYSMAL ATRIAL FIBRILLATION (MULTI): ICD-10-CM

## 2025-07-02 LAB
POC INR: 1.8 (ref 0.9–1.1)
POC PROTHROMBIN TIME: ABNORMAL (ref 9.3–12.5)

## 2025-07-02 PROCEDURE — 99211 OFF/OP EST MAY X REQ PHY/QHP: CPT | Performed by: INTERNAL MEDICINE

## 2025-07-02 PROCEDURE — 85610 PROTHROMBIN TIME: CPT | Mod: QW

## 2025-07-02 NOTE — PROGRESS NOTES
Patient identification verified with 2 identifiers.    Location: [facility] [place][note 3] - 1st Floor Anticoagulation Clinic [address] [id number]    Referring Physician: DR Abad  Enrollment/ Re-enrollment date: 2025   INR Goal: 2.0-3.0  INR monitoring is per [facility] protocol.  Anticoagulation Medication: warfarin  Indication: Atrial Fibrillation/Atrial Flutter    Subjective   Bleeding signs/symptoms:      Bruising:     Major bleeding event:    Thrombosis signs/symptoms:    Thromboembolic event:    Missed doses:    Extra doses:    Medication changes:    Dietary changes:    Change in health:    Change in activity:    Alcohol:    Other concerns:      Upcoming Procedures:  Does the Patient Have any upcoming procedures that require interruption in anticoagulation therapy? no  Does the patient require bridging? no      Anticoagulation Summary  As of 7/2/2025      INR goal:  2.0-3.0   TTR:  58.9% (1.6 y)   INR used for dosing:  --               Assessment/Plan   Therapeutic     1. New dose: no change    2. Next INR: 1 month      Education provided to patient during the visit:  Patient instructed to call in interim with questions, concerns and changes.         [Parents] : parents [Breast milk] : breast milk [Hours between feeds ___] : Child is fed every [unfilled] hours [Vitamins ___] : Patient takes [unfilled] vitamins daily [Normal] : Normal [Frequency of stools: ___] : Frequency of stools: [unfilled]  stools [per day] : per day. [In Bassinet/Crib] : sleeps in bassinet/crib [On back] : sleeps on back [Co-sleeping] : no co-sleeping [Loose bedding, pillow, toys, and/or bumpers in crib] : no loose bedding, pillow, toys, and/or bumpers in crib [Pacifier use] : Pacifier use [Tummy time] : tummy time [No] : No cigarette smoke exposure [Water heater temperature set at <120 degrees F] : Water heater temperature set at <120 degrees F [Rear facing car seat in back seat] : Rear facing car seat in back seat [Carbon Monoxide Detectors] : Carbon monoxide detectors at home [Smoke Detectors] : Smoke detectors at home. [Gun in Home] : No gun in home [At risk for exposure to TB] : Not at risk for exposure to Tuberculosis

## 2025-07-10 ENCOUNTER — OFFICE VISIT (OUTPATIENT)
Dept: PRIMARY CARE | Facility: CLINIC | Age: 75
End: 2025-07-10
Payer: MEDICARE

## 2025-07-10 VITALS
WEIGHT: 126 LBS | HEIGHT: 67 IN | OXYGEN SATURATION: 95 % | HEART RATE: 55 BPM | RESPIRATION RATE: 16 BRPM | SYSTOLIC BLOOD PRESSURE: 100 MMHG | DIASTOLIC BLOOD PRESSURE: 60 MMHG | BODY MASS INDEX: 19.78 KG/M2

## 2025-07-10 DIAGNOSIS — I48.20 CHRONIC ATRIAL FIBRILLATION (MULTI): ICD-10-CM

## 2025-07-10 DIAGNOSIS — Z71.85 IMMUNIZATION COUNSELING: ICD-10-CM

## 2025-07-10 DIAGNOSIS — I50.32 CHRONIC DIASTOLIC CONGESTIVE HEART FAILURE: ICD-10-CM

## 2025-07-10 DIAGNOSIS — Z12.5 SCREENING FOR PROSTATE CANCER: ICD-10-CM

## 2025-07-10 DIAGNOSIS — L60.2 LONG TOENAIL: ICD-10-CM

## 2025-07-10 DIAGNOSIS — H35.3230 BILATERAL EXUDATIVE AGE-RELATED MACULAR DEGENERATION, UNSPECIFIED STAGE: ICD-10-CM

## 2025-07-10 DIAGNOSIS — J44.9 CHRONIC OBSTRUCTIVE PULMONARY DISEASE, UNSPECIFIED COPD TYPE (MULTI): ICD-10-CM

## 2025-07-10 DIAGNOSIS — I10 BENIGN HYPERTENSION: Primary | ICD-10-CM

## 2025-07-10 DIAGNOSIS — Z13.1 SCREENING FOR DIABETES MELLITUS: ICD-10-CM

## 2025-07-10 PROCEDURE — 1126F AMNT PAIN NOTED NONE PRSNT: CPT | Performed by: INTERNAL MEDICINE

## 2025-07-10 PROCEDURE — G2211 COMPLEX E/M VISIT ADD ON: HCPCS | Performed by: INTERNAL MEDICINE

## 2025-07-10 PROCEDURE — G0446 INTENS BEHAVE THER CARDIO DX: HCPCS | Performed by: INTERNAL MEDICINE

## 2025-07-10 PROCEDURE — 99214 OFFICE O/P EST MOD 30 MIN: CPT | Performed by: INTERNAL MEDICINE

## 2025-07-10 PROCEDURE — 1159F MED LIST DOCD IN RCRD: CPT | Performed by: INTERNAL MEDICINE

## 2025-07-10 PROCEDURE — 1036F TOBACCO NON-USER: CPT | Performed by: INTERNAL MEDICINE

## 2025-07-10 PROCEDURE — 3074F SYST BP LT 130 MM HG: CPT | Performed by: INTERNAL MEDICINE

## 2025-07-10 PROCEDURE — 3078F DIAST BP <80 MM HG: CPT | Performed by: INTERNAL MEDICINE

## 2025-07-10 PROCEDURE — 3008F BODY MASS INDEX DOCD: CPT | Performed by: INTERNAL MEDICINE

## 2025-07-10 PROCEDURE — 1160F RVW MEDS BY RX/DR IN RCRD: CPT | Performed by: INTERNAL MEDICINE

## 2025-07-10 RX ORDER — ACETAMINOPHEN 500 MG
1 TABLET ORAL DAILY
Qty: 1 KIT | Refills: 0 | Status: SHIPPED | OUTPATIENT
Start: 2025-07-10

## 2025-07-10 RX ORDER — LOSARTAN POTASSIUM 25 MG/1
12.5 TABLET ORAL DAILY
Qty: 45 TABLET | Refills: 3 | Status: SHIPPED | OUTPATIENT
Start: 2025-07-10 | End: 2026-07-10

## 2025-07-10 ASSESSMENT — ENCOUNTER SYMPTOMS
DIZZINESS: 0
ARTHRALGIAS: 0
OCCASIONAL FEELINGS OF UNSTEADINESS: 0
NAUSEA: 0
NERVOUS/ANXIOUS: 0
ABDOMINAL PAIN: 0
CONSTIPATION: 0
PALPITATIONS: 0
DIARRHEA: 1
DEPRESSION: 0
LOSS OF SENSATION IN FEET: 0
COUGH: 0

## 2025-07-10 ASSESSMENT — PATIENT HEALTH QUESTIONNAIRE - PHQ9
1. LITTLE INTEREST OR PLEASURE IN DOING THINGS: NOT AT ALL
2. FEELING DOWN, DEPRESSED OR HOPELESS: NOT AT ALL
SUM OF ALL RESPONSES TO PHQ9 QUESTIONS 1 AND 2: 0

## 2025-07-10 ASSESSMENT — PAIN SCALES - GENERAL: PAINLEVEL_OUTOF10: 0-NO PAIN

## 2025-07-10 NOTE — PATIENT INSTRUCTIONS
Please checks your blood pressures daily. Please obtain AREXVY (the RSV vaccine) as well as two doses of the shingrix vaccine at your local pharmacy.

## 2025-07-10 NOTE — PROGRESS NOTES
"Subjective   Patient ID: Reggie Munoz is a 74 y.o. male who presents for 6 month check up.    Patient is doing well overall. Sometimes, gets intermittent headaches which can be alleviated with taking an ibuprofen. Has seen an improvement in his breathing after using an inhaler. Lives alone at home and doesn't drive. Doesn't check blood pressures at home. Occasionally has episodes of diarrhea. In terms of vaccines, is up to date on all of them except for RSV and both doses of shingrix. Sees ophthalmologist and dentist regularly.     Diagnostics Reviewed: 6/2/2025 XR Chest: Was normal.   Labs Reviewed:         Review of Systems   Respiratory:  Positive for shortness of breath. Negative for cough.    Cardiovascular:  Negative for chest pain and palpitations.   Gastrointestinal:  Positive for diarrhea. Negative for abdominal pain, constipation and nausea.   Musculoskeletal:  Negative for arthralgias.   Neurological:  Negative for dizziness.   Psychiatric/Behavioral:  Negative for behavioral problems. The patient is not nervous/anxious.      Objective   /60   Pulse 55   Resp 16   Ht 1.702 m (5' 7\")   Wt 57.2 kg (126 lb)   SpO2 95%   BMI 19.73 kg/m²     Physical Exam  Cardiovascular:      Rate and Rhythm: Normal rate and regular rhythm.      Heart sounds: Normal heart sounds.   Pulmonary:      Breath sounds: Normal breath sounds.   Abdominal:      Palpations: Abdomen is soft. There is no hepatomegaly.      Tenderness: There is no abdominal tenderness.   Musculoskeletal:      Right lower leg: No edema.      Left lower leg: No edema.   Feet:      Right foot:      Toenail Condition: Right toenails are long.      Left foot:      Toenail Condition: Left toenails are long.   Neurological:      Mental Status: He is alert and oriented to person, place, and time.      Gait: Gait normal.   Psychiatric:         Mood and Affect: Mood normal.         Behavior: Behavior normal.       Assessment/Plan   Diagnoses and " all orders for this visit:  Benign hypertension  -     losartan (Cozaar) 25 mg tablet; Take 0.5 tablets (12.5 mg) by mouth once daily.  -     CBC and Auto Differential; Future  -     Comprehensive Metabolic Panel; Future  -     Vitamin B12  -     Prostate Specific Antigen, Screen; Future  -     Hemoglobin A1C; Future  -     TSH with reflex to Free T4 if abnormal; Future  -     Lipid Panel; Future  -     Hepatitis C Antibody; Future  -     zoster vaccine-recombinant adjuvanted (Shingrix) 50 mcg/0.5 mL vaccine; Inject 0.5 mL into the muscle every 8 (eight) weeks for 2 doses.  -     blood pressure monitor kit; 1 each once daily.  Chronic diastolic congestive heart failure  -     CBC and Auto Differential; Future  -     Comprehensive Metabolic Panel; Future  -     Vitamin B12  -     Prostate Specific Antigen, Screen; Future  -     Hemoglobin A1C; Future  -     TSH with reflex to Free T4 if abnormal; Future  -     Lipid Panel; Future  -     Hepatitis C Antibody; Future  -     zoster vaccine-recombinant adjuvanted (Shingrix) 50 mcg/0.5 mL vaccine; Inject 0.5 mL into the muscle every 8 (eight) weeks for 2 doses.  Bilateral exudative age-related macular degeneration, unspecified stage  -     CBC and Auto Differential; Future  -     Comprehensive Metabolic Panel; Future  -     Vitamin B12  -     Prostate Specific Antigen, Screen; Future  -     Hemoglobin A1C; Future  -     TSH with reflex to Free T4 if abnormal; Future  -     Lipid Panel; Future  -     Hepatitis C Antibody; Future  -     zoster vaccine-recombinant adjuvanted (Shingrix) 50 mcg/0.5 mL vaccine; Inject 0.5 mL into the muscle every 8 (eight) weeks for 2 doses.  Chronic atrial fibrillation (Multi)  -     CBC and Auto Differential; Future  -     Comprehensive Metabolic Panel; Future  -     Vitamin B12  -     Prostate Specific Antigen, Screen; Future  -     Hemoglobin A1C; Future  -     TSH with reflex to Free T4 if abnormal; Future  -     Lipid Panel; Future  -      Hepatitis C Antibody; Future  -     zoster vaccine-recombinant adjuvanted (Shingrix) 50 mcg/0.5 mL vaccine; Inject 0.5 mL into the muscle every 8 (eight) weeks for 2 doses.  Chronic obstructive pulmonary disease, unspecified COPD type (Multi)  -     CBC and Auto Differential; Future  -     Comprehensive Metabolic Panel; Future  -     Vitamin B12  -     Prostate Specific Antigen, Screen; Future  -     Hemoglobin A1C; Future  -     TSH with reflex to Free T4 if abnormal; Future  -     Lipid Panel; Future  -     Hepatitis C Antibody; Future  -     zoster vaccine-recombinant adjuvanted (Shingrix) 50 mcg/0.5 mL vaccine; Inject 0.5 mL into the muscle every 8 (eight) weeks for 2 doses.  Screening for prostate cancer  -     CBC and Auto Differential; Future  -     Comprehensive Metabolic Panel; Future  -     Vitamin B12  -     Prostate Specific Antigen, Screen; Future  -     Hemoglobin A1C; Future  -     TSH with reflex to Free T4 if abnormal; Future  -     Lipid Panel; Future  -     Hepatitis C Antibody; Future  -     zoster vaccine-recombinant adjuvanted (Shingrix) 50 mcg/0.5 mL vaccine; Inject 0.5 mL into the muscle every 8 (eight) weeks for 2 doses.  Screening for diabetes mellitus  -     CBC and Auto Differential; Future  -     Comprehensive Metabolic Panel; Future  -     Vitamin B12  -     Prostate Specific Antigen, Screen; Future  -     Hemoglobin A1C; Future  -     TSH with reflex to Free T4 if abnormal; Future  -     Lipid Panel; Future  -     Hepatitis C Antibody; Future  -     zoster vaccine-recombinant adjuvanted (Shingrix) 50 mcg/0.5 mL vaccine; Inject 0.5 mL into the muscle every 8 (eight) weeks for 2 doses.  Immunization counseling  -     CBC and Auto Differential; Future  -     Comprehensive Metabolic Panel; Future  -     Vitamin B12  -     Prostate Specific Antigen, Screen; Future  -     Hemoglobin A1C; Future  -     TSH with reflex to Free T4 if abnormal; Future  -     Lipid Panel; Future  -     Hepatitis  C Antibody; Future  -     zoster vaccine-recombinant adjuvanted (Shingrix) 50 mcg/0.5 mL vaccine; Inject 0.5 mL into the muscle every 8 (eight) weeks for 2 doses.  -     respiratory syncytial virus, RSV, vaccine, adjuvanted, age 60y+ (Arexvy) 120 mcg/0.5 mL suspension for reconstitution; Inject 0.5 mL into the muscle 1 time for 1 dose.  Long toenail  -     Referral to Podiatry; Future    Cardiovascular risk was discussed and lifestyle modifications recommended, including nutritional choices, regular exercise, and elimination of risky habits.  Discussion time 15 minutes

## 2025-07-14 ENCOUNTER — APPOINTMENT (OUTPATIENT)
Dept: UROLOGY | Facility: CLINIC | Age: 75
End: 2025-07-14
Payer: MEDICARE

## 2025-07-14 DIAGNOSIS — R31.29 MICROSCOPIC HEMATURIA: ICD-10-CM

## 2025-07-14 DIAGNOSIS — Z12.5 PROSTATE CANCER SCREENING: Primary | ICD-10-CM

## 2025-07-14 DIAGNOSIS — R35.0 URINARY FREQUENCY: ICD-10-CM

## 2025-07-14 DIAGNOSIS — R39.9 URINARY SYMPTOM OR SIGN: ICD-10-CM

## 2025-07-14 PROBLEM — C61 PROSTATE CANCER (MULTI): Status: RESOLVED | Noted: 2023-09-09 | Resolved: 2025-07-14

## 2025-07-14 LAB
POC APPEARANCE, URINE: CLEAR
POC BILIRUBIN, URINE: NEGATIVE
POC BLOOD, URINE: ABNORMAL
POC COLOR, URINE: YELLOW
POC GLUCOSE, URINE: NEGATIVE MG/DL
POC KETONES, URINE: NEGATIVE MG/DL
POC LEUKOCYTES, URINE: NEGATIVE
POC NITRITE,URINE: NEGATIVE
POC PH, URINE: 6.5 PH
POC PROTEIN, URINE: NEGATIVE MG/DL
POC SPECIFIC GRAVITY, URINE: 1.01
POC UROBILINOGEN, URINE: 0.2 EU/DL

## 2025-07-14 PROCEDURE — 99214 OFFICE O/P EST MOD 30 MIN: CPT | Performed by: UROLOGY

## 2025-07-14 PROCEDURE — G2211 COMPLEX E/M VISIT ADD ON: HCPCS | Performed by: UROLOGY

## 2025-07-14 PROCEDURE — 81003 URINALYSIS AUTO W/O SCOPE: CPT | Performed by: UROLOGY

## 2025-07-14 PROCEDURE — 1159F MED LIST DOCD IN RCRD: CPT | Performed by: UROLOGY

## 2025-07-14 PROCEDURE — 1125F AMNT PAIN NOTED PAIN PRSNT: CPT | Performed by: UROLOGY

## 2025-07-14 ASSESSMENT — PAIN SCALES - GENERAL: PAINLEVEL_OUTOF10: 1

## 2025-07-14 ASSESSMENT — ENCOUNTER SYMPTOMS: SHORTNESS OF BREATH: 1

## 2025-07-14 NOTE — PROGRESS NOTES
Patient is a 74 y.o. male presenting as a new patient referred for concern of urinary frequency and for prostate cancer screening.    SUBJECTIVE:  HPI   He was referred by Blayne Haq PA-C for urinary frequency.  He states he is bothered by nocturia 3-4x.   He has no other urinary complaints today.    He was prescribed digoxin in January 2025.  His last PSA was 2.40 in June 2024.    Medical History[1]  Surgical History[2]     Review of Systems   Pertinent findings noted in the HPI.    OBJECTIVE:  There were no vitals taken for this visit.  Physical Exam   Constitutional: No obvious distress.  Cardiovascular: Extremities are warm and well perfused.  Respiratory: No audible wheezing/stridor; respirations do not appear labored.  Neurologic: Alert and oriented x3.  Genitourinary: No CVA tenderness, bladder not palpable.   No scrotal masses or tenderness. No penile lesions.   CHIDI: prostate is 3+, smooth without tenderness.      LABS:  Lab Results   Component Value Date    WBC 7.8 07/15/2025    HGB 15.6 07/15/2025    HCT 47.3 07/15/2025    MCV 90.4 07/15/2025     07/15/2025    GLUCOSE 85 11/03/2024    CALCIUM 9.7 11/03/2024     11/03/2024    K 3.6 11/03/2024    CO2 29 11/03/2024     11/03/2024    BUN 20 11/03/2024    CREATININE 0.96 11/03/2024    EGFR 85 11/03/2024    PSA 1.63 07/15/2025    URINECULTURE SEE NOTE 07/14/2025     IMAGING:  PROCEDURES:  PVR: 64 mL (7/14/25)    ASSESSMENT/PLAN:  Problem List Items Addressed This Visit       Microscopic hematuria    Relevant Orders    Urine Culture (Completed)    Microscopic Only, Urine (Completed)    Urinary frequency    Prostate cancer screening - Primary    Relevant Orders    PSA (Completed)     Other Visit Diagnoses         Urinary symptom or sign        Relevant Orders    Measure post void residual (Completed)    POCT UA Automated manually resulted (Completed)           He is bothered by urinary frequency and nocturia 3-4x. He is not interested in  treatment at this time.    He has mild urinary retention. PVR: 64 mL today. Repeat bladder scan with followup.    He is followed for prostate cancer screening. PSA ordered.  PSA summary  06/05/2024 2.40  02/14/2022 2.5  11/18/2020 1.3  09/09/2019 0.9    Dip urinalysis identified trace-intact blood. Urine will be sent for microscopy, culture, and sensitivity.     RTC in 6 months.    All questions were answered to the patient’s satisfaction.  Patient agrees with the plan and wishes to proceed.  Follow-up will be scheduled appropriately.     Roseann COLLINS, am scribing for, and in the presence of Lucio Gonzalez MD.     Lucio COLLINS MD, personally performed the services described in the documentation as scribed by Roseann Ferrell, in my presence, and confirm it is both accurate and complete.         [1]   Past Medical History:  Diagnosis Date    A-fib (Multi)     COPD (chronic obstructive pulmonary disease) (Multi)     Hyperlipidemia     Hypertension     Low back pain     Nonexudative age-related macular degeneration, right eye, early dry stage     Osteopenia     Other secondary cataract, bilateral     Personal history of other diseases of the circulatory system     History of angina    Personal history of other diseases of the circulatory system     History of hypertension    Personal history of other diseases of the respiratory system     History of chronic obstructive lung disease    Pseudophakia     Refractive error     Spinal stenosis     Unspecified asthma with (acute) exacerbation ([facility])     Asthma with acute exacerbation, unspecified asthma severity, unspecified whether persistent   [2]   Past Surgical History:  Procedure Laterality Date    CATARACT EXTRACTION      IR ANGIOGRAM PULMONARY Bilateral 04/10/2012    IR ANGIOGRAM PULMONARY [facility]    OTHER SURGICAL HISTORY  07/23/2021    No history of surgery    SINUS SURGERY

## 2025-07-14 NOTE — PATIENT INSTRUCTIONS
Your provider has ordered blood work to be drawn.   has partnered with [employer], your orders can be sent electronically.  If you choose to use an outside lab, please take a copy of your lab requisite with you to your lab of choice and have them fax results to Dr. Gonzalez at [phone].

## 2025-07-16 PROBLEM — R33.9 URINARY RETENTION: Status: ACTIVE | Noted: 2025-07-16

## 2025-07-16 LAB
ALBUMIN SERPL-MCNC: 4.5 G/DL (ref 3.6–5.1)
ALP SERPL-CCNC: 89 U/L (ref 35–144)
ALT SERPL-CCNC: 13 U/L (ref 9–46)
ANION GAP SERPL CALCULATED.4IONS-SCNC: 10 MMOL/L (CALC) (ref 7–17)
AST SERPL-CCNC: 15 U/L (ref 10–35)
BACTERIA #/AREA URNS HPF: NORMAL /HPF
BACTERIA UR CULT: NORMAL
BASOPHILS # BLD AUTO: 47 CELLS/UL (ref 0–200)
BASOPHILS NFR BLD AUTO: 0.6 %
BILIRUB SERPL-MCNC: 0.8 MG/DL (ref 0.2–1.2)
BUN SERPL-MCNC: 21 MG/DL (ref 7–25)
CALCIUM SERPL-MCNC: 9.8 MG/DL (ref 8.6–10.3)
CHLORIDE SERPL-SCNC: 101 MMOL/L (ref 98–110)
CHOLEST SERPL-MCNC: 155 MG/DL
CHOLEST/HDLC SERPL: 3 (CALC)
CO2 SERPL-SCNC: 28 MMOL/L (ref 20–32)
CREAT SERPL-MCNC: 0.99 MG/DL (ref 0.7–1.28)
EGFRCR SERPLBLD CKD-EPI 2021: 81 ML/MIN/1.73M2
EOSINOPHIL # BLD AUTO: 1100 CELLS/UL (ref 15–500)
EOSINOPHIL NFR BLD AUTO: 14.1 %
ERYTHROCYTE [DISTWIDTH] IN BLOOD BY AUTOMATED COUNT: 13.6 % (ref 11–15)
EST. AVERAGE GLUCOSE BLD GHB EST-MCNC: 120 MG/DL
EST. AVERAGE GLUCOSE BLD GHB EST-SCNC: 6.6 MMOL/L
GLUCOSE SERPL-MCNC: 83 MG/DL (ref 65–99)
HBA1C MFR BLD: 5.8 %
HCT VFR BLD AUTO: 47.3 % (ref 38.5–50)
HCV AB SERPL QL IA: NORMAL
HDLC SERPL-MCNC: 52 MG/DL
HGB BLD-MCNC: 15.6 G/DL (ref 13.2–17.1)
HYALINE CASTS #/AREA URNS LPF: NORMAL /LPF
LDLC SERPL CALC-MCNC: 84 MG/DL (CALC)
LYMPHOCYTES # BLD AUTO: 1560 CELLS/UL (ref 850–3900)
LYMPHOCYTES NFR BLD AUTO: 20 %
MCH RBC QN AUTO: 29.8 PG (ref 27–33)
MCHC RBC AUTO-ENTMCNC: 33 G/DL (ref 32–36)
MCV RBC AUTO: 90.4 FL (ref 80–100)
MONOCYTES # BLD AUTO: 616 CELLS/UL (ref 200–950)
MONOCYTES NFR BLD AUTO: 7.9 %
NEUTROPHILS # BLD AUTO: 4477 CELLS/UL (ref 1500–7800)
NEUTROPHILS NFR BLD AUTO: 57.4 %
NONHDLC SERPL-MCNC: 103 MG/DL (CALC)
PLATELET # BLD AUTO: 256 THOUSAND/UL (ref 140–400)
PMV BLD REES-ECKER: 11 FL (ref 7.5–12.5)
POTASSIUM SERPL-SCNC: 4 MMOL/L (ref 3.5–5.3)
PROT SERPL-MCNC: 6.9 G/DL (ref 6.1–8.1)
PSA SERPL-MCNC: 1.63 NG/ML
PSA SERPL-MCNC: 1.69 NG/ML
RBC # BLD AUTO: 5.23 MILLION/UL (ref 4.2–5.8)
RBC #/AREA URNS HPF: NORMAL /HPF
SERVICE CMNT-IMP: NORMAL
SODIUM SERPL-SCNC: 139 MMOL/L (ref 135–146)
SQUAMOUS #/AREA URNS HPF: NORMAL /HPF
TRIGL SERPL-MCNC: 92 MG/DL
TSH SERPL-ACNC: 1.96 MIU/L (ref 0.4–4.5)
VIT B12 SERPL-MCNC: 623 PG/ML (ref 200–1100)
WBC # BLD AUTO: 7.8 THOUSAND/UL (ref 3.8–10.8)
WBC #/AREA URNS HPF: NORMAL /HPF

## 2025-07-30 ENCOUNTER — ANTICOAGULATION - WARFARIN VISIT (OUTPATIENT)
Dept: CARDIOLOGY | Facility: HOSPITAL | Age: 75
End: 2025-07-30
Payer: MEDICARE

## 2025-07-30 DIAGNOSIS — I48.0 PAROXYSMAL ATRIAL FIBRILLATION (MULTI): Primary | ICD-10-CM

## 2025-07-30 LAB
POC INR: 1.5 (ref 0.9–1.1)
POC PROTHROMBIN TIME: ABNORMAL (ref 9.3–12.5)

## 2025-07-30 PROCEDURE — 99211 OFF/OP EST MAY X REQ PHY/QHP: CPT | Performed by: INTERNAL MEDICINE

## 2025-07-30 PROCEDURE — 85610 PROTHROMBIN TIME: CPT | Mod: QW | Performed by: INTERNAL MEDICINE

## 2025-07-30 NOTE — PROGRESS NOTES
Patient identification verified with 2 identifiers.    Location: [facility] - 1st Floor Anticoagulation Clinic [address] [id number]    Referring Physician: DR Abad  Enrollment/ Re-enrollment date:    INR Goal: 2.0-3.0  INR monitoring is per [facility] protocol.  Anticoagulation Medication: warfarin  Indication: Atrial Fibrillation/Atrial Flutter    Subjective   Bleeding signs/symptoms: No    Bruising: No   Major bleeding event: No  Thrombosis signs/symptoms: No  Thromboembolic event: No  Missed doses: No  Extra doses: No  Medication changes: No  Dietary changes: No  Change in health: No  Change in activity: No  Alcohol: No  Other concerns: No    Upcoming Procedures:  Does the Patient Have any upcoming procedures that require interruption in anticoagulation therapy? no  Does the patient require bridging? no      Anticoagulation Summary  As of 2025      INR goal:  2.0-3.0   TTR:  57.1% (1.7 y)   INR used for dosin.50 (2025)   Weekly warfarin total:  18 mg               Assessment/Plan   Subtherapeutic     1. New dose: wiil take as directed until new prescription     2. Next INR: 1 month      Education provided to patient during the visit:  Patient instructed to call in interim with questions, concerns and changes.

## 2025-08-27 ENCOUNTER — ANTICOAGULATION - WARFARIN VISIT (OUTPATIENT)
Dept: CARDIOLOGY | Facility: HOSPITAL | Age: 75
End: 2025-08-27
Payer: MEDICARE

## 2025-08-27 DIAGNOSIS — I48.0 PAROXYSMAL ATRIAL FIBRILLATION (MULTI): Primary | ICD-10-CM

## 2025-08-27 LAB
POC INR: 2.8 (ref 0.9–1.1)
POC PROTHROMBIN TIME: ABNORMAL (ref 9.3–12.5)

## 2025-08-27 PROCEDURE — 85610 PROTHROMBIN TIME: CPT | Mod: QW | Performed by: INTERNAL MEDICINE

## 2025-08-27 PROCEDURE — 99211 OFF/OP EST MAY X REQ PHY/QHP: CPT | Performed by: INTERNAL MEDICINE

## 2025-08-28 ENCOUNTER — HOSPITAL ENCOUNTER (INPATIENT)
Facility: HOSPITAL | Age: 75
LOS: 3 days | Discharge: NURSING, REHAB OR LONG-TERM CARE | End: 2025-08-31
Attending: INTERNAL MEDICINE | Admitting: INTERNAL MEDICINE
Payer: MEDICARE

## 2025-08-28 ENCOUNTER — APPOINTMENT (OUTPATIENT)
Dept: RADIOLOGY | Facility: HOSPITAL | Age: 75
DRG: 522 | End: 2025-08-28
Payer: MEDICARE

## 2025-08-28 ENCOUNTER — APPOINTMENT (OUTPATIENT)
Dept: CARDIOLOGY | Facility: HOSPITAL | Age: 75
DRG: 522 | End: 2025-08-28
Payer: MEDICARE

## 2025-08-28 PROBLEM — S72.001A CLOSED FRACTURE OF RIGHT HIP, INITIAL ENCOUNTER: Status: ACTIVE | Noted: 2025-08-28

## 2025-08-28 PROBLEM — J44.9 COPD (CHRONIC OBSTRUCTIVE PULMONARY DISEASE) (MULTI): Status: ACTIVE | Noted: 2025-08-28

## 2025-08-28 PROCEDURE — 72170 X-RAY EXAM OF PELVIS: CPT

## 2025-08-28 PROCEDURE — 93005 ELECTROCARDIOGRAM TRACING: CPT

## 2025-08-28 PROCEDURE — 73552 X-RAY EXAM OF FEMUR 2/>: CPT | Mod: RT

## 2025-08-28 ASSESSMENT — PAIN DESCRIPTION - LOCATION
LOCATION: HIP
LOCATION: HIP

## 2025-08-28 ASSESSMENT — PAIN SCALES - GENERAL
PAINLEVEL_OUTOF10: 7
PAINLEVEL_OUTOF10: 4

## 2025-08-28 ASSESSMENT — LIFESTYLE VARIABLES
TOTAL SCORE: 0
HAVE PEOPLE ANNOYED YOU BY CRITICIZING YOUR DRINKING: NO
EVER FELT BAD OR GUILTY ABOUT YOUR DRINKING: NO
EVER HAD A DRINK FIRST THING IN THE MORNING TO STEADY YOUR NERVES TO GET RID OF A HANGOVER: NO
HAVE YOU EVER FELT YOU SHOULD CUT DOWN ON YOUR DRINKING: NO

## 2025-08-28 ASSESSMENT — PAIN DESCRIPTION - ORIENTATION: ORIENTATION: RIGHT

## 2025-08-28 ASSESSMENT — PAIN - FUNCTIONAL ASSESSMENT
PAIN_FUNCTIONAL_ASSESSMENT: 0-10
PAIN_FUNCTIONAL_ASSESSMENT: 0-10

## 2025-08-28 ASSESSMENT — PAIN DESCRIPTION - PAIN TYPE: TYPE: ACUTE PAIN

## 2025-08-29 ENCOUNTER — ANESTHESIA (OUTPATIENT)
Dept: OPERATING ROOM | Facility: HOSPITAL | Age: 75
End: 2025-08-29
Payer: MEDICARE

## 2025-08-29 ENCOUNTER — APPOINTMENT (OUTPATIENT)
Dept: RADIOLOGY | Facility: HOSPITAL | Age: 75
DRG: 522 | End: 2025-08-29
Payer: MEDICARE

## 2025-08-29 ENCOUNTER — ANESTHESIA EVENT (OUTPATIENT)
Dept: OPERATING ROOM | Facility: HOSPITAL | Age: 75
End: 2025-08-29
Payer: MEDICARE

## 2025-08-29 PROCEDURE — 76000 FLUOROSCOPY <1 HR PHYS/QHP: CPT

## 2025-08-29 PROCEDURE — 72170 X-RAY EXAM OF PELVIS: CPT

## 2025-08-29 PROCEDURE — 99100 ANES PT EXTEME AGE<1 YR&>70: CPT | Performed by: STUDENT IN AN ORGANIZED HEALTH CARE EDUCATION/TRAINING PROGRAM

## 2025-08-29 PROCEDURE — 2500000005 HC RX 250 GENERAL PHARMACY W/O HCPCS: Performed by: NURSE ANESTHETIST, CERTIFIED REGISTERED

## 2025-08-29 PROCEDURE — 2500000004 HC RX 250 GENERAL PHARMACY W/ HCPCS (ALT 636 FOR OP/ED): Performed by: NURSE ANESTHETIST, CERTIFIED REGISTERED

## 2025-08-29 PROCEDURE — A27236 PR FEMORAL FX, OPEN TX: Performed by: STUDENT IN AN ORGANIZED HEALTH CARE EDUCATION/TRAINING PROGRAM

## 2025-08-29 PROCEDURE — A27236 PR FEMORAL FX, OPEN TX: Performed by: NURSE ANESTHETIST, CERTIFIED REGISTERED

## 2025-08-29 RX ORDER — FENTANYL CITRATE 50 UG/ML
INJECTION, SOLUTION INTRAMUSCULAR; INTRAVENOUS AS NEEDED
Status: DISCONTINUED | OUTPATIENT
Start: 2025-08-29 | End: 2025-08-29

## 2025-08-29 RX ORDER — CEFAZOLIN 1 G/1
INJECTION, POWDER, FOR SOLUTION INTRAVENOUS AS NEEDED
Status: DISCONTINUED | OUTPATIENT
Start: 2025-08-29 | End: 2025-08-29

## 2025-08-29 RX ORDER — PROPOFOL 10 MG/ML
INJECTION, EMULSION INTRAVENOUS AS NEEDED
Status: DISCONTINUED | OUTPATIENT
Start: 2025-08-29 | End: 2025-08-29

## 2025-08-29 RX ORDER — TRANEXAMIC ACID 1 G/10ML
INJECTION, SOLUTION INTRAVENOUS AS NEEDED
Status: DISCONTINUED | OUTPATIENT
Start: 2025-08-29 | End: 2025-08-29

## 2025-08-29 RX ORDER — ONDANSETRON HYDROCHLORIDE 2 MG/ML
INJECTION, SOLUTION INTRAVENOUS AS NEEDED
Status: DISCONTINUED | OUTPATIENT
Start: 2025-08-29 | End: 2025-08-29

## 2025-08-29 RX ORDER — PHENYLEPHRINE HCL IN 0.9% NACL 0.4MG/10ML
SYRINGE (ML) INTRAVENOUS AS NEEDED
Status: DISCONTINUED | OUTPATIENT
Start: 2025-08-29 | End: 2025-08-29

## 2025-08-29 RX ORDER — SODIUM CHLORIDE, SODIUM LACTATE, POTASSIUM CHLORIDE, CALCIUM CHLORIDE 600; 310; 30; 20 MG/100ML; MG/100ML; MG/100ML; MG/100ML
INJECTION, SOLUTION INTRAVENOUS CONTINUOUS PRN
Status: DISCONTINUED | OUTPATIENT
Start: 2025-08-29 | End: 2025-08-29

## 2025-08-29 RX ORDER — GLYCOPYRROLATE 0.2 MG/ML
INJECTION INTRAMUSCULAR; INTRAVENOUS AS NEEDED
Status: DISCONTINUED | OUTPATIENT
Start: 2025-08-29 | End: 2025-08-29

## 2025-08-29 RX ORDER — ROCURONIUM BROMIDE 10 MG/ML
INJECTION, SOLUTION INTRAVENOUS AS NEEDED
Status: DISCONTINUED | OUTPATIENT
Start: 2025-08-29 | End: 2025-08-29

## 2025-08-29 RX ORDER — LIDOCAINE HYDROCHLORIDE 20 MG/ML
INJECTION, SOLUTION INFILTRATION; PERINEURAL AS NEEDED
Status: DISCONTINUED | OUTPATIENT
Start: 2025-08-29 | End: 2025-08-29

## 2025-08-29 RX ADMIN — PROPOFOL 20 MG: 10 INJECTION, EMULSION INTRAVENOUS at 09:03

## 2025-08-29 RX ADMIN — Medication 100 MCG: at 08:10

## 2025-08-29 RX ADMIN — Medication 100 MCG: at 08:46

## 2025-08-29 RX ADMIN — PROPOFOL 10 MG: 10 INJECTION, EMULSION INTRAVENOUS at 09:35

## 2025-08-29 RX ADMIN — PROPOFOL 130 MG: 10 INJECTION, EMULSION INTRAVENOUS at 08:05

## 2025-08-29 RX ADMIN — DEXAMETHASONE SODIUM PHOSPHATE 4 MG: 4 INJECTION, SOLUTION INTRAMUSCULAR; INTRAVENOUS at 09:23

## 2025-08-29 RX ADMIN — FENTANYL CITRATE 50 MCG: 50 INJECTION, SOLUTION INTRAMUSCULAR; INTRAVENOUS at 08:05

## 2025-08-29 RX ADMIN — ONDANSETRON HYDROCHLORIDE 4 MG: 2 SOLUTION INTRAMUSCULAR; INTRAVENOUS at 09:36

## 2025-08-29 RX ADMIN — EPHEDRINE SULFATE 10 MG: 50 INJECTION, SOLUTION INTRAVENOUS at 08:48

## 2025-08-29 RX ADMIN — SUGAMMADEX 100 MG: 100 INJECTION, SOLUTION INTRAVENOUS at 10:13

## 2025-08-29 RX ADMIN — GLYCOPYRROLATE 0.4 MG: 0.2 INJECTION INTRAMUSCULAR; INTRAVENOUS at 08:53

## 2025-08-29 RX ADMIN — Medication 100 MCG: at 08:34

## 2025-08-29 RX ADMIN — FENTANYL CITRATE 50 MCG: 50 INJECTION, SOLUTION INTRAMUSCULAR; INTRAVENOUS at 08:57

## 2025-08-29 RX ADMIN — EPHEDRINE SULFATE 5 MG: 50 INJECTION, SOLUTION INTRAVENOUS at 08:53

## 2025-08-29 RX ADMIN — EPHEDRINE SULFATE 5 MG: 50 INJECTION, SOLUTION INTRAVENOUS at 08:47

## 2025-08-29 RX ADMIN — SUGAMMADEX 100 MG: 100 INJECTION, SOLUTION INTRAVENOUS at 10:11

## 2025-08-29 RX ADMIN — PROPOFOL 20 MG: 10 INJECTION, EMULSION INTRAVENOUS at 09:46

## 2025-08-29 RX ADMIN — TRANEXAMIC ACID 1000 MG: 100 INJECTION INTRAVENOUS at 09:36

## 2025-08-29 RX ADMIN — CEFAZOLIN 2 G: 330 INJECTION, POWDER, FOR SOLUTION INTRAMUSCULAR; INTRAVENOUS at 08:08

## 2025-08-29 RX ADMIN — Medication 100 MCG: at 08:28

## 2025-08-29 RX ADMIN — ROCURONIUM BROMIDE 50 MG: 10 INJECTION, SOLUTION INTRAVENOUS at 08:05

## 2025-08-29 RX ADMIN — LIDOCAINE HYDROCHLORIDE 100 MG: 20 INJECTION, SOLUTION INFILTRATION; PERINEURAL at 08:05

## 2025-08-29 RX ADMIN — EPHEDRINE SULFATE 5 MG: 50 INJECTION, SOLUTION INTRAVENOUS at 08:46

## 2025-08-29 RX ADMIN — EPHEDRINE SULFATE 5 MG: 50 INJECTION, SOLUTION INTRAVENOUS at 08:50

## 2025-08-29 RX ADMIN — Medication 100 MCG: at 08:49

## 2025-08-29 RX ADMIN — SODIUM CHLORIDE, POTASSIUM CHLORIDE, SODIUM LACTATE AND CALCIUM CHLORIDE: 600; 310; 30; 20 INJECTION, SOLUTION INTRAVENOUS at 07:53

## 2025-08-29 RX ADMIN — EPHEDRINE SULFATE 10 MG: 50 INJECTION, SOLUTION INTRAVENOUS at 08:49

## 2025-08-29 RX ADMIN — TRANEXAMIC ACID 1000 MG: 100 INJECTION INTRAVENOUS at 08:11

## 2025-08-29 RX ADMIN — PROPOFOL 20 MG: 10 INJECTION, EMULSION INTRAVENOUS at 08:59

## 2025-08-29 SDOH — SOCIAL STABILITY: SOCIAL NETWORK: IN A TYPICAL WEEK, HOW MANY TIMES DO YOU TALK ON THE PHONE WITH FAMILY, FRIENDS, OR NEIGHBORS?: THREE TIMES A WEEK

## 2025-08-29 SDOH — ECONOMIC STABILITY: FOOD INSECURITY: WITHIN THE PAST 12 MONTHS, YOU WORRIED THAT YOUR FOOD WOULD RUN OUT BEFORE YOU GOT THE MONEY TO BUY MORE.: NEVER TRUE

## 2025-08-29 SDOH — SOCIAL STABILITY: SOCIAL INSECURITY
WITHIN THE LAST YEAR, HAVE YOU BEEN RAPED OR FORCED TO HAVE ANY KIND OF SEXUAL ACTIVITY BY YOUR PARTNER OR EX-PARTNER?: NO

## 2025-08-29 SDOH — HEALTH STABILITY: MENTAL HEALTH
DO YOU FEEL STRESS - TENSE, RESTLESS, NERVOUS, OR ANXIOUS, OR UNABLE TO SLEEP AT NIGHT BECAUSE YOUR MIND IS TROUBLED ALL THE TIME - THESE DAYS?: NOT AT ALL

## 2025-08-29 SDOH — HEALTH STABILITY: PHYSICAL HEALTH: ON AVERAGE, HOW MANY MINUTES DO YOU ENGAGE IN EXERCISE AT THIS LEVEL?: 0 MIN

## 2025-08-29 SDOH — SOCIAL STABILITY: SOCIAL INSECURITY: HAVE YOU HAD THOUGHTS OF HARMING ANYONE ELSE?: NO

## 2025-08-29 SDOH — SOCIAL STABILITY: SOCIAL NETWORK: HOW OFTEN DO YOU GET TOGETHER WITH FRIENDS OR RELATIVES?: THREE TIMES A WEEK

## 2025-08-29 SDOH — SOCIAL STABILITY: SOCIAL INSECURITY: WITHIN THE LAST YEAR, HAVE YOU BEEN HUMILIATED OR EMOTIONALLY ABUSED IN OTHER WAYS BY YOUR PARTNER OR EX-PARTNER?: NO

## 2025-08-29 SDOH — HEALTH STABILITY: PHYSICAL HEALTH
HOW OFTEN DO YOU NEED TO HAVE SOMEONE HELP YOU WHEN YOU READ INSTRUCTIONS, PAMPHLETS, OR OTHER WRITTEN MATERIAL FROM YOUR DOCTOR OR PHARMACY?: NEVER

## 2025-08-29 SDOH — SOCIAL STABILITY: SOCIAL NETWORK
DO YOU BELONG TO ANY CLUBS OR ORGANIZATIONS SUCH AS CHURCH GROUPS, UNIONS, FRATERNAL OR ATHLETIC GROUPS, OR SCHOOL GROUPS?: NO

## 2025-08-29 SDOH — HEALTH STABILITY: PHYSICAL HEALTH: ON AVERAGE, HOW MANY DAYS PER WEEK DO YOU ENGAGE IN MODERATE TO STRENUOUS EXERCISE (LIKE A BRISK WALK)?: 0 DAYS

## 2025-08-29 SDOH — ECONOMIC STABILITY: FOOD INSECURITY: WITHIN THE PAST 12 MONTHS, THE FOOD YOU BOUGHT JUST DIDN'T LAST AND YOU DIDN'T HAVE MONEY TO GET MORE.: NEVER TRUE

## 2025-08-29 SDOH — SOCIAL STABILITY: SOCIAL INSECURITY: WITHIN THE LAST YEAR, HAVE YOU BEEN AFRAID OF YOUR PARTNER OR EX-PARTNER?: NO

## 2025-08-29 SDOH — SOCIAL STABILITY: SOCIAL NETWORK: HOW OFTEN DO YOU ATTEND CHURCH OR RELIGIOUS SERVICES?: MORE THAN 4 TIMES PER YEAR

## 2025-08-29 SDOH — SOCIAL STABILITY: SOCIAL NETWORK: HOW OFTEN DO YOU ATTEND MEETINGS OF THE CLUBS OR ORGANIZATIONS YOU BELONG TO?: NEVER

## 2025-08-29 SDOH — HEALTH STABILITY: MENTAL HEALTH: CURRENT SMOKER: 0

## 2025-08-29 SDOH — SOCIAL STABILITY: SOCIAL INSECURITY: ARE YOU MARRIED, WIDOWED, DIVORCED, SEPARATED, NEVER MARRIED, OR LIVING WITH A PARTNER?: NEVER MARRIED

## 2025-08-29 SDOH — ECONOMIC STABILITY: INCOME INSECURITY: IN THE PAST 12 MONTHS HAS THE ELECTRIC, GAS, OIL, OR WATER COMPANY THREATENED TO SHUT OFF SERVICES IN YOUR HOME?: NO

## 2025-08-29 SDOH — HEALTH STABILITY: MENTAL HEALTH: EXPERIENCED ANY OF THE FOLLOWING LIFE EVENTS: DEATH OF FAMILY/FRIEND

## 2025-08-29 SDOH — SOCIAL STABILITY: SOCIAL INSECURITY: WERE YOU ABLE TO COMPLETE ALL THE BEHAVIORAL HEALTH SCREENINGS?: YES

## 2025-08-29 ASSESSMENT — COGNITIVE AND FUNCTIONAL STATUS - GENERAL
WALKING IN HOSPITAL ROOM: A LOT
DRESSING REGULAR LOWER BODY CLOTHING: A LITTLE
DRESSING REGULAR UPPER BODY CLOTHING: A LITTLE
DRESSING REGULAR UPPER BODY CLOTHING: A LITTLE
HELP NEEDED FOR BATHING: A LITTLE
DRESSING REGULAR UPPER BODY CLOTHING: A LITTLE
MOBILITY SCORE: 16
DRESSING REGULAR LOWER BODY CLOTHING: A LITTLE
PATIENT BASELINE BEDBOUND: NO
STANDING UP FROM CHAIR USING ARMS: A LOT
STANDING UP FROM CHAIR USING ARMS: A LOT
MOVING TO AND FROM BED TO CHAIR: A LOT
WALKING IN HOSPITAL ROOM: A LOT
PERSONAL GROOMING: A LITTLE
DAILY ACTIVITIY SCORE: 19
DAILY ACTIVITIY SCORE: 19
CLIMB 3 TO 5 STEPS WITH RAILING: A LOT
CLIMB 3 TO 5 STEPS WITH RAILING: A LOT
TOILETING: A LITTLE
MOVING TO AND FROM BED TO CHAIR: A LITTLE
HELP NEEDED FOR BATHING: A LITTLE
HELP NEEDED FOR BATHING: A LITTLE
MOBILITY SCORE: 24
DAILY ACTIVITIY SCORE: 24
PERSONAL GROOMING: A LITTLE
TOILETING: A LITTLE
MOVING TO AND FROM BED TO CHAIR: A LOT
MOVING FROM LYING ON BACK TO SITTING ON SIDE OF FLAT BED WITH BEDRAILS: A LITTLE
DRESSING REGULAR LOWER BODY CLOTHING: A LITTLE
CLIMB 3 TO 5 STEPS WITH RAILING: A LOT
MOBILITY SCORE: 14
TURNING FROM BACK TO SIDE WHILE IN FLAT BAD: A LITTLE
TURNING FROM BACK TO SIDE WHILE IN FLAT BAD: A LITTLE
WALKING IN HOSPITAL ROOM: A LOT
MOVING FROM LYING ON BACK TO SITTING ON SIDE OF FLAT BED WITH BEDRAILS: A LITTLE
DAILY ACTIVITIY SCORE: 19
STANDING UP FROM CHAIR USING ARMS: A LOT
TURNING FROM BACK TO SIDE WHILE IN FLAT BAD: A LITTLE
PERSONAL GROOMING: A LITTLE
TOILETING: A LITTLE

## 2025-08-29 ASSESSMENT — ACTIVITIES OF DAILY LIVING (ADL)
HEARING - LEFT EAR: FUNCTIONAL
ASSISTIVE_DEVICE: DENTURES UPPER
LACK_OF_TRANSPORTATION: NO
ADEQUATE_TO_COMPLETE_ADL: YES
TOILETING: INDEPENDENT
JUDGMENT_ADEQUATE_SAFELY_COMPLETE_DAILY_ACTIVITIES: YES
PATIENT'S MEMORY ADEQUATE TO SAFELY COMPLETE DAILY ACTIVITIES?: YES
DRESSING YOURSELF: INDEPENDENT
FEEDING YOURSELF: INDEPENDENT
GROOMING: INDEPENDENT
BATHING: INDEPENDENT
HEARING - RIGHT EAR: FUNCTIONAL
WALKS IN HOME: INDEPENDENT

## 2025-08-29 ASSESSMENT — PAIN SCALES - GENERAL
PAINLEVEL_OUTOF10: 0 - NO PAIN
PAINLEVEL_OUTOF10: 0 - NO PAIN
PAINLEVEL_OUTOF10: 7
PAINLEVEL_OUTOF10: 6
PAINLEVEL_OUTOF10: 6
PAINLEVEL_OUTOF10: 5 - MODERATE PAIN

## 2025-08-29 ASSESSMENT — ENCOUNTER SYMPTOMS
DYSPNEA ON EXERTION: 0
NEAR-SYNCOPE: 0
LIGHT-HEADEDNESS: 0
NAUSEA: 0
PALPITATIONS: 0
LOSS OF BALANCE: 0
SHORTNESS OF BREATH: 1
VOMITING: 0
SYNCOPE: 0

## 2025-08-29 ASSESSMENT — LIFESTYLE VARIABLES
SKIP TO QUESTIONS 9-10: 1
AUDIT-C TOTAL SCORE: 0
HOW MANY STANDARD DRINKS CONTAINING ALCOHOL DO YOU HAVE ON A TYPICAL DAY: PATIENT DOES NOT DRINK
HOW OFTEN DO YOU HAVE 6 OR MORE DRINKS ON ONE OCCASION: NEVER
AUDIT-C TOTAL SCORE: 0
SUBSTANCE_ABUSE_PAST_12_MONTHS: NO
HOW OFTEN DO YOU HAVE A DRINK CONTAINING ALCOHOL: NEVER
PRESCIPTION_ABUSE_PAST_12_MONTHS: NO

## 2025-08-29 ASSESSMENT — PAIN DESCRIPTION - ORIENTATION
ORIENTATION: RIGHT

## 2025-08-29 ASSESSMENT — PAIN - FUNCTIONAL ASSESSMENT
PAIN_FUNCTIONAL_ASSESSMENT: 0-10

## 2025-08-29 ASSESSMENT — PAIN DESCRIPTION - LOCATION
LOCATION: HIP

## 2025-08-29 ASSESSMENT — PATIENT HEALTH QUESTIONNAIRE - PHQ9
1. LITTLE INTEREST OR PLEASURE IN DOING THINGS: NOT AT ALL
2. FEELING DOWN, DEPRESSED OR HOPELESS: NOT AT ALL
SUM OF ALL RESPONSES TO PHQ9 QUESTIONS 1 & 2: 0

## 2025-08-29 ASSESSMENT — PAIN DESCRIPTION - DESCRIPTORS
DESCRIPTORS: ACHING;DISCOMFORT
DESCRIPTORS: ACHING;NAGGING
DESCRIPTORS: ACHING;NAGGING

## 2025-08-30 ASSESSMENT — PAIN SCALES - GENERAL
PAINLEVEL_OUTOF10: 3
PAINLEVEL_OUTOF10: 0 - NO PAIN
PAINLEVEL_OUTOF10: 3
PAINLEVEL_OUTOF10: 0 - NO PAIN
PAINLEVEL_OUTOF10: 3
PAINLEVEL_OUTOF10: 6

## 2025-08-30 ASSESSMENT — COGNITIVE AND FUNCTIONAL STATUS - GENERAL
CLIMB 3 TO 5 STEPS WITH RAILING: TOTAL
DAILY ACTIVITIY SCORE: 19
PERSONAL GROOMING: A LITTLE
CLIMB 3 TO 5 STEPS WITH RAILING: A LOT
DAILY ACTIVITIY SCORE: 15
MOVING TO AND FROM BED TO CHAIR: A LITTLE
WALKING IN HOSPITAL ROOM: A LOT
DAILY ACTIVITIY SCORE: 19
TOILETING: A LITTLE
MOVING FROM LYING ON BACK TO SITTING ON SIDE OF FLAT BED WITH BEDRAILS: A LITTLE
WALKING IN HOSPITAL ROOM: A LOT
MOVING TO AND FROM BED TO CHAIR: A LOT
CLIMB 3 TO 5 STEPS WITH RAILING: A LOT
DRESSING REGULAR LOWER BODY CLOTHING: TOTAL
PERSONAL GROOMING: A LITTLE
MOBILITY SCORE: 12
HELP NEEDED FOR BATHING: A LOT
DRESSING REGULAR LOWER BODY CLOTHING: A LITTLE
TOILETING: A LOT
MOBILITY SCORE: 17
DRESSING REGULAR UPPER BODY CLOTHING: A LITTLE
TURNING FROM BACK TO SIDE WHILE IN FLAT BAD: A LOT
TURNING FROM BACK TO SIDE WHILE IN FLAT BAD: A LITTLE
HELP NEEDED FOR BATHING: A LITTLE
TOILETING: A LITTLE
HELP NEEDED FOR BATHING: A LITTLE
TURNING FROM BACK TO SIDE WHILE IN FLAT BAD: A LITTLE
STANDING UP FROM CHAIR USING ARMS: A LITTLE
MOVING TO AND FROM BED TO CHAIR: A LITTLE
PERSONAL GROOMING: A LITTLE
DRESSING REGULAR UPPER BODY CLOTHING: A LITTLE
MOBILITY SCORE: 17
WALKING IN HOSPITAL ROOM: A LOT
DRESSING REGULAR UPPER BODY CLOTHING: A LITTLE
STANDING UP FROM CHAIR USING ARMS: A LOT
DRESSING REGULAR LOWER BODY CLOTHING: A LITTLE
STANDING UP FROM CHAIR USING ARMS: A LITTLE

## 2025-08-30 ASSESSMENT — PAIN DESCRIPTION - ORIENTATION
ORIENTATION: RIGHT
ORIENTATION: RIGHT

## 2025-08-30 ASSESSMENT — PAIN - FUNCTIONAL ASSESSMENT
PAIN_FUNCTIONAL_ASSESSMENT: 0-10

## 2025-08-30 ASSESSMENT — PAIN DESCRIPTION - LOCATION
LOCATION: HIP
LOCATION: HIP

## 2025-08-30 ASSESSMENT — ACTIVITIES OF DAILY LIVING (ADL)
ADL_ASSISTANCE: INDEPENDENT
HOME_MANAGEMENT_TIME_ENTRY: 10
ADL_ASSISTANCE: INDEPENDENT
BATHING_ASSISTANCE: MODERATE

## 2025-08-30 ASSESSMENT — PAIN DESCRIPTION - DESCRIPTORS
DESCRIPTORS: ACHING

## 2025-08-31 PROBLEM — I10 HTN (HYPERTENSION): Status: ACTIVE | Noted: 2025-08-31

## 2025-08-31 PROBLEM — I50.32 DIASTOLIC DYSFUNCTION WITH CHRONIC HEART FAILURE: Status: ACTIVE | Noted: 2025-08-31

## 2025-08-31 ASSESSMENT — PAIN DESCRIPTION - DESCRIPTORS: DESCRIPTORS: ACHING

## 2025-08-31 ASSESSMENT — PAIN SCALES - GENERAL: PAINLEVEL_OUTOF10: 7

## 2025-08-31 ASSESSMENT — COGNITIVE AND FUNCTIONAL STATUS - GENERAL
DRESSING REGULAR LOWER BODY CLOTHING: A LITTLE
MOBILITY SCORE: 19
TOILETING: A LITTLE
MOVING TO AND FROM BED TO CHAIR: A LITTLE
PERSONAL GROOMING: A LITTLE
CLIMB 3 TO 5 STEPS WITH RAILING: A LITTLE
WALKING IN HOSPITAL ROOM: A LITTLE
TURNING FROM BACK TO SIDE WHILE IN FLAT BAD: A LITTLE
HELP NEEDED FOR BATHING: A LITTLE
DAILY ACTIVITIY SCORE: 20
STANDING UP FROM CHAIR USING ARMS: A LITTLE

## 2025-08-31 ASSESSMENT — PAIN - FUNCTIONAL ASSESSMENT: PAIN_FUNCTIONAL_ASSESSMENT: 0-10

## 2026-01-26 ENCOUNTER — APPOINTMENT (OUTPATIENT)
Dept: UROLOGY | Facility: CLINIC | Age: 76
End: 2026-01-26
Payer: MEDICARE

## 2026-03-10 ENCOUNTER — APPOINTMENT (OUTPATIENT)
Dept: OPHTHALMOLOGY | Facility: CLINIC | Age: 76
End: 2026-03-10
Payer: MEDICARE

## 2026-03-11 ENCOUNTER — APPOINTMENT (OUTPATIENT)
Dept: OPHTHALMOLOGY | Facility: CLINIC | Age: 76
End: 2026-03-11
Payer: MEDICARE